# Patient Record
Sex: MALE | Race: WHITE | NOT HISPANIC OR LATINO | Employment: OTHER | URBAN - METROPOLITAN AREA
[De-identification: names, ages, dates, MRNs, and addresses within clinical notes are randomized per-mention and may not be internally consistent; named-entity substitution may affect disease eponyms.]

---

## 2019-04-06 ENCOUNTER — TRANSCRIBE ORDERS (OUTPATIENT)
Dept: ADMINISTRATIVE | Facility: HOSPITAL | Age: 39
End: 2019-04-06

## 2019-04-06 DIAGNOSIS — R51.9 NONINTRACTABLE HEADACHE, UNSPECIFIED CHRONICITY PATTERN, UNSPECIFIED HEADACHE TYPE: ICD-10-CM

## 2019-04-06 DIAGNOSIS — R25.1 TREMORS OF NERVOUS SYSTEM: ICD-10-CM

## 2019-04-06 DIAGNOSIS — M54.2 NECK PAIN: Primary | ICD-10-CM

## 2019-04-06 DIAGNOSIS — R56.9 SEIZURES (HCC): ICD-10-CM

## 2019-04-23 ENCOUNTER — HOSPITAL ENCOUNTER (OUTPATIENT)
Dept: RADIOLOGY | Facility: HOSPITAL | Age: 39
Discharge: HOME | End: 2019-04-23
Payer: MEDICARE

## 2019-04-23 ENCOUNTER — HOSPITAL ENCOUNTER (OUTPATIENT)
Dept: RADIOLOGY | Facility: HOSPITAL | Age: 39
Discharge: HOME | End: 2019-04-23

## 2019-04-23 DIAGNOSIS — R56.9 SEIZURES (HCC): ICD-10-CM

## 2019-04-23 DIAGNOSIS — R51.9 NONINTRACTABLE HEADACHE, UNSPECIFIED CHRONICITY PATTERN, UNSPECIFIED HEADACHE TYPE: ICD-10-CM

## 2019-04-23 DIAGNOSIS — M54.2 NECK PAIN: ICD-10-CM

## 2019-04-23 DIAGNOSIS — R25.1 TREMORS OF NERVOUS SYSTEM: ICD-10-CM

## 2019-04-23 PROCEDURE — A9585 GADOBUTROL INJECTION: HCPCS

## 2019-04-23 PROCEDURE — 70553 MRI BRAIN STEM W/O & W/DYE: CPT

## 2019-04-23 PROCEDURE — 72141 MRI NECK SPINE W/O DYE: CPT

## 2019-04-23 RX ADMIN — GADOBUTROL 7 ML: 604.72 INJECTION INTRAVENOUS at 15:06

## 2019-06-17 ENCOUNTER — HOSPITAL ENCOUNTER (OUTPATIENT)
Dept: RADIOLOGY | Facility: HOSPITAL | Age: 39
Discharge: HOME | End: 2019-06-17
Attending: OTOLARYNGOLOGY
Payer: MEDICARE

## 2019-06-17 DIAGNOSIS — M24.20 EAGLE'S SYNDROME: ICD-10-CM

## 2019-06-17 PROCEDURE — 70491 CT SOFT TISSUE NECK W/DYE: CPT

## 2019-06-17 RX ADMIN — IOHEXOL 85 ML: 350 INJECTION, SOLUTION INTRAVENOUS at 10:06

## 2021-04-13 ENCOUNTER — HOSPITAL ENCOUNTER (EMERGENCY)
Facility: HOSPITAL | Age: 41
Discharge: HOME | End: 2021-04-13
Attending: EMERGENCY MEDICINE
Payer: MEDICARE

## 2021-04-13 ENCOUNTER — APPOINTMENT (EMERGENCY)
Dept: RADIOLOGY | Facility: HOSPITAL | Age: 41
End: 2021-04-13
Payer: MEDICARE

## 2021-04-13 VITALS
TEMPERATURE: 98.2 F | WEIGHT: 215 LBS | SYSTOLIC BLOOD PRESSURE: 155 MMHG | OXYGEN SATURATION: 98 % | DIASTOLIC BLOOD PRESSURE: 95 MMHG | RESPIRATION RATE: 16 BRPM | HEART RATE: 89 BPM | BODY MASS INDEX: 32.69 KG/M2

## 2021-04-13 DIAGNOSIS — M79.10 MYALGIA: ICD-10-CM

## 2021-04-13 DIAGNOSIS — G89.29 CHRONIC BACK PAIN: ICD-10-CM

## 2021-04-13 DIAGNOSIS — M54.9 CHRONIC BACK PAIN: ICD-10-CM

## 2021-04-13 DIAGNOSIS — M79.604 BILATERAL LEG PAIN: Primary | ICD-10-CM

## 2021-04-13 DIAGNOSIS — M79.605 BILATERAL LEG PAIN: Primary | ICD-10-CM

## 2021-04-13 LAB
ALBUMIN SERPL BCP-MCNC: 4.5 G/DL (ref 3.5–5)
ALP SERPL-CCNC: 138 U/L (ref 46–116)
ALT SERPL W P-5'-P-CCNC: 40 U/L (ref 12–78)
ANION GAP SERPL CALCULATED.3IONS-SCNC: 12 MMOL/L (ref 4–13)
APTT PPP: 29 SECONDS (ref 23–37)
AST SERPL W P-5'-P-CCNC: 20 U/L (ref 5–45)
BASOPHILS # BLD AUTO: 0.04 THOUSANDS/ΜL (ref 0–0.1)
BASOPHILS NFR BLD AUTO: 0 % (ref 0–1)
BILIRUB SERPL-MCNC: 0.85 MG/DL (ref 0.2–1)
BUN SERPL-MCNC: 18 MG/DL (ref 5–25)
CALCIUM SERPL-MCNC: 8.8 MG/DL (ref 8.3–10.1)
CHLORIDE SERPL-SCNC: 104 MMOL/L (ref 100–108)
CK MB SERPL-MCNC: 1.1 NG/ML (ref 0–5)
CK MB SERPL-MCNC: <1 % (ref 0–2.5)
CK SERPL-CCNC: 255 U/L (ref 39–308)
CO2 SERPL-SCNC: 22 MMOL/L (ref 21–32)
CREAT SERPL-MCNC: 1.22 MG/DL (ref 0.6–1.3)
D DIMER PPP FEU-MCNC: 0.41 UG/ML FEU
EOSINOPHIL # BLD AUTO: 0.03 THOUSAND/ΜL (ref 0–0.61)
EOSINOPHIL NFR BLD AUTO: 0 % (ref 0–6)
ERYTHROCYTE [DISTWIDTH] IN BLOOD BY AUTOMATED COUNT: 13.2 % (ref 11.6–15.1)
GFR SERPL CREATININE-BSD FRML MDRD: 73 ML/MIN/1.73SQ M
GLUCOSE SERPL-MCNC: 117 MG/DL (ref 65–140)
HCT VFR BLD AUTO: 52.5 % (ref 36.5–49.3)
HGB BLD-MCNC: 17.1 G/DL (ref 12–17)
IMM GRANULOCYTES # BLD AUTO: 0.11 THOUSAND/UL (ref 0–0.2)
IMM GRANULOCYTES NFR BLD AUTO: 1 % (ref 0–2)
INR PPP: 1.06 (ref 0.84–1.19)
LIPASE SERPL-CCNC: 64 U/L (ref 73–393)
LYMPHOCYTES # BLD AUTO: 1.9 THOUSANDS/ΜL (ref 0.6–4.47)
LYMPHOCYTES NFR BLD AUTO: 15 % (ref 14–44)
MCH RBC QN AUTO: 29.1 PG (ref 26.8–34.3)
MCHC RBC AUTO-ENTMCNC: 32.6 G/DL (ref 31.4–37.4)
MCV RBC AUTO: 89 FL (ref 82–98)
MONOCYTES # BLD AUTO: 0.86 THOUSAND/ΜL (ref 0.17–1.22)
MONOCYTES NFR BLD AUTO: 7 % (ref 4–12)
NEUTROPHILS # BLD AUTO: 10.12 THOUSANDS/ΜL (ref 1.85–7.62)
NEUTS SEG NFR BLD AUTO: 77 % (ref 43–75)
NRBC BLD AUTO-RTO: 0 /100 WBCS
PLATELET # BLD AUTO: 248 THOUSANDS/UL (ref 149–390)
PMV BLD AUTO: 9.8 FL (ref 8.9–12.7)
POTASSIUM SERPL-SCNC: 3.6 MMOL/L (ref 3.5–5.3)
PROT SERPL-MCNC: 8.5 G/DL (ref 6.4–8.2)
PROTHROMBIN TIME: 13.7 SECONDS (ref 11.6–14.5)
RBC # BLD AUTO: 5.87 MILLION/UL (ref 3.88–5.62)
SODIUM SERPL-SCNC: 138 MMOL/L (ref 136–145)
WBC # BLD AUTO: 13.06 THOUSAND/UL (ref 4.31–10.16)

## 2021-04-13 PROCEDURE — 74177 CT ABD & PELVIS W/CONTRAST: CPT

## 2021-04-13 PROCEDURE — 96374 THER/PROPH/DIAG INJ IV PUSH: CPT

## 2021-04-13 PROCEDURE — 85025 COMPLETE CBC W/AUTO DIFF WBC: CPT | Performed by: PHYSICIAN ASSISTANT

## 2021-04-13 PROCEDURE — 99284 EMERGENCY DEPT VISIT MOD MDM: CPT

## 2021-04-13 PROCEDURE — 86617 LYME DISEASE ANTIBODY: CPT | Performed by: PHYSICIAN ASSISTANT

## 2021-04-13 PROCEDURE — 85730 THROMBOPLASTIN TIME PARTIAL: CPT | Performed by: PHYSICIAN ASSISTANT

## 2021-04-13 PROCEDURE — 36415 COLL VENOUS BLD VENIPUNCTURE: CPT | Performed by: PHYSICIAN ASSISTANT

## 2021-04-13 PROCEDURE — 86618 LYME DISEASE ANTIBODY: CPT | Performed by: PHYSICIAN ASSISTANT

## 2021-04-13 PROCEDURE — 83690 ASSAY OF LIPASE: CPT | Performed by: PHYSICIAN ASSISTANT

## 2021-04-13 PROCEDURE — 85379 FIBRIN DEGRADATION QUANT: CPT | Performed by: PHYSICIAN ASSISTANT

## 2021-04-13 PROCEDURE — 82550 ASSAY OF CK (CPK): CPT | Performed by: PHYSICIAN ASSISTANT

## 2021-04-13 PROCEDURE — 99284 EMERGENCY DEPT VISIT MOD MDM: CPT | Performed by: PHYSICIAN ASSISTANT

## 2021-04-13 PROCEDURE — 80053 COMPREHEN METABOLIC PANEL: CPT | Performed by: PHYSICIAN ASSISTANT

## 2021-04-13 PROCEDURE — 85610 PROTHROMBIN TIME: CPT | Performed by: PHYSICIAN ASSISTANT

## 2021-04-13 PROCEDURE — 82553 CREATINE MB FRACTION: CPT | Performed by: PHYSICIAN ASSISTANT

## 2021-04-13 PROCEDURE — 96361 HYDRATE IV INFUSION ADD-ON: CPT

## 2021-04-13 RX ORDER — CYCLOBENZAPRINE HCL 10 MG
10 TABLET ORAL 3 TIMES DAILY PRN
Qty: 9 TABLET | Refills: 0 | Status: SHIPPED | OUTPATIENT
Start: 2021-04-13 | End: 2021-04-16

## 2021-04-13 RX ORDER — KETOROLAC TROMETHAMINE 30 MG/ML
15 INJECTION, SOLUTION INTRAMUSCULAR; INTRAVENOUS ONCE
Status: COMPLETED | OUTPATIENT
Start: 2021-04-13 | End: 2021-04-13

## 2021-04-13 RX ADMIN — SODIUM CHLORIDE 1000 ML: 0.9 INJECTION, SOLUTION INTRAVENOUS at 11:03

## 2021-04-13 RX ADMIN — KETOROLAC TROMETHAMINE 15 MG: 30 INJECTION, SOLUTION INTRAMUSCULAR at 12:03

## 2021-04-13 RX ADMIN — IOHEXOL 100 ML: 350 INJECTION, SOLUTION INTRAVENOUS at 11:18

## 2021-04-13 NOTE — DISCHARGE INSTRUCTIONS
Do not drive or operate heavy machinery while taking Flexeril as this medication may make you sleepy and at risk for sedation

## 2021-04-13 NOTE — ED PROVIDER NOTES
History  Chief Complaint   Patient presents with    Leg Pain     pt complains of left leg pain that started in lateral thigh and now has traveled up to left groin? states he had a scratch, skin isnt warm or swollen; pt states also now has pain in right hamstring; denies fevers     39 y/o male presenting with left upper outer thigh pain accompanied with numbness in the same location radiating to the left lower buttock region over the past 2 weeks  Patient states that he has had some intermittent body aches located to the bilateral upper arms and lower extremities  Now experiencing some thigh tightness on the right side  Has had intermittent sweating episodes when sleeping last night, however no weight loss  Patient states that the left thigh pain began after he scratched his leg  Has not noticed any spreading redness or drainage, has not had any fevers however has had chills  No sick contacts that he knows of  Has had intermittent epigastric abdominal pain that is nonradiating that started a few days ago, took Pepto-Bismol and is unsure if this alleviated any of his symptoms  Patient has chronic back pain which has not worsened  Some constipation the past few days which seems to be new  Denies nausea vomiting, diarrhea, shortness breath, chest pain  Prior to Admission Medications   Prescriptions Last Dose Informant Patient Reported? Taking?   gabapentin (NEURONTIN) 100 mg capsule   No Yes   Sig: Take 1 capsule (100 mg total) by mouth 3 (three) times a day   omeprazole (PriLOSEC) 40 MG capsule Not Taking at Unknown time Self No No   Sig: Take 1 capsule (40 mg total) by mouth daily   Patient not taking: Reported on 4/13/2021      Facility-Administered Medications: None       History reviewed  No pertinent past medical history  History reviewed  No pertinent surgical history      Family History   Family history unknown: Yes     I have reviewed and agree with the history as documented  E-Cigarette/Vaping     E-Cigarette/Vaping Substances     Social History     Tobacco Use    Smoking status: Never Smoker    Smokeless tobacco: Never Used   Substance Use Topics    Alcohol use: Never     Frequency: Never    Drug use: Never       Review of Systems   Constitutional: Positive for chills and fatigue  Negative for activity change, appetite change, diaphoresis, fever and unexpected weight change  HENT: Negative  Eyes: Negative  Respiratory: Negative  Cardiovascular: Negative  Gastrointestinal: Positive for abdominal pain  Negative for abdominal distention, anal bleeding, blood in stool, constipation, diarrhea, nausea, rectal pain and vomiting  Genitourinary: Negative  Musculoskeletal: Positive for myalgias  Negative for arthralgias, back pain, gait problem, joint swelling, neck pain and neck stiffness  Skin: Positive for rash  Negative for color change, pallor and wound  Neurological: Negative  All other systems reviewed and are negative  Physical Exam  Physical Exam  Vitals signs and nursing note reviewed  Constitutional:       General: He is not in acute distress  Appearance: He is well-developed  He is not diaphoretic  HENT:      Head: Normocephalic and atraumatic  Right Ear: External ear normal       Left Ear: External ear normal       Nose: Nose normal       Mouth/Throat:      Pharynx: No oropharyngeal exudate  Eyes:      General: No scleral icterus  Right eye: No discharge  Left eye: No discharge  Conjunctiva/sclera: Conjunctivae normal       Pupils: Pupils are equal, round, and reactive to light  Neck:      Musculoskeletal: Normal range of motion and neck supple  Cardiovascular:      Rate and Rhythm: Normal rate and regular rhythm  Pulses: Normal pulses  Heart sounds: Normal heart sounds  No murmur  No friction rub  No gallop      Pulmonary:      Effort: Pulmonary effort is normal  No respiratory distress  Breath sounds: Normal breath sounds  No stridor  No wheezing, rhonchi or rales  Comments: spo2 is 99% indicating adequate oxygenation   Clear BS in all lung fields  Chest:      Chest wall: No tenderness  Abdominal:      General: Abdomen is flat  Bowel sounds are normal  There is no distension  Palpations: Abdomen is soft  There is no mass  Tenderness: There is abdominal tenderness  There is no right CVA tenderness, left CVA tenderness, guarding or rebound  Hernia: No hernia is present  Musculoskeletal:        Arms:       Comments: No calf swelling or asymmetries, no tenderness, negative Homans sign bilaterally, normal sensation throughout bilateral lower extremities with 2+ DP pulses  Patient ambulating without difficulty  No tenderness to the bilateral lower extremities   Lymphadenopathy:      Cervical: No cervical adenopathy  Skin:     General: Skin is warm and dry  Capillary Refill: Capillary refill takes less than 2 seconds  Coloration: Skin is not pale  Findings: No erythema or rash  Neurological:      General: No focal deficit present  Mental Status: He is alert and oriented to person, place, and time  Mental status is at baseline  Cranial Nerves: No cranial nerve deficit           Vital Signs  ED Triage Vitals [04/13/21 0926]   Temperature Pulse Respirations Blood Pressure SpO2   98 °F (36 7 °C) 88 17 (!) 180/75 99 %      Temp Source Heart Rate Source Patient Position - Orthostatic VS BP Location FiO2 (%)   Tympanic -- Sitting Left arm --      Pain Score       Worst Possible Pain           Vitals:    04/13/21 0926 04/13/21 1129   BP: (!) 180/75 155/95   Pulse: 88 89   Patient Position - Orthostatic VS: Sitting Sitting         Visual Acuity      ED Medications  Medications   sodium chloride 0 9 % bolus 1,000 mL (0 mL Intravenous Stopped 4/13/21 1209)   iohexol (OMNIPAQUE) 350 MG/ML injection (SINGLE-DOSE) 100 mL (100 mL Intravenous Given 4/13/21 1118)   ketorolac (TORADOL) injection 15 mg (15 mg Intravenous Given 4/13/21 1203)       Diagnostic Studies  Results Reviewed     Procedure Component Value Units Date/Time    Lyme Antibody Profile with reflex to [facility] [[id number]] Collected: 04/13/21 1200    Lab Status:  In process Specimen: Blood from Arm, Left Updated: 04/13/21 1204    CKMB [[id number]]  (Normal) Collected: 04/13/21 1009    Lab Status: Final result Specimen: Blood from Arm, Left Updated: 04/13/21 1151     CK-MB Index <1 0 %      CK-MB 1 1 ng/mL     Lipase [[id number]]  (Abnormal) Collected: 04/13/21 1009    Lab Status: Final result Specimen: Blood from Arm, Left Updated: 04/13/21 1051     Lipase 64 u/L     CK (with reflex to MB) [[id number]]  (Normal) Collected: 04/13/21 1009    Lab Status: Final result Specimen: Blood from Arm, Left Updated: 04/13/21 1051     Total  U/L     Comprehensive metabolic panel [[id number]]  (Abnormal) Collected: 04/13/21 1009    Lab Status: Final result Specimen: Blood from Arm, Left Updated: 04/13/21 1033     Sodium 138 mmol/L      Potassium 3 6 mmol/L      Chloride 104 mmol/L      CO2 22 mmol/L      ANION GAP 12 mmol/L      BUN 18 mg/dL      Creatinine 1 22 mg/dL      Glucose 117 mg/dL      Calcium 8 8 mg/dL      AST 20 U/L      ALT 40 U/L      Alkaline Phosphatase 138 U/L      Total Protein 8 5 g/dL      Albumin 4 5 g/dL      Total Bilirubin 0 85 mg/dL      eGFR 73 ml/min/1 73sq m     Narrative:      Vikas guidelines for Chronic Kidney Disease (CKD):     Stage 1 with normal or high GFR (GFR > 90 mL/min/1 73 square meters)    Stage 2 Mild CKD (GFR = 60-89 mL/min/1 73 square meters)    Stage 3A Moderate CKD (GFR = 45-59 mL/min/1 73 square meters)    Stage 3B Moderate CKD (GFR = 30-44 mL/min/1 73 square meters)    Stage 4 Severe CKD (GFR = 15-29 mL/min/1 73 square meters)    Stage 5 End Stage CKD (GFR <15 mL/min/1 73 square meters)  Note: GFR calculation is accurate only with a steady state creatinine    D-dimer, quantitative [[id number]]  (Normal) Collected: 21 100    Lab Status: Final result Specimen: Blood from Arm, Left Updated: 21 1031     D-Dimer, Quant 0 41 ug/ml FEU     Protime-INR [[id number]]  (Normal) Collected: 21    Lab Status: Final result Specimen: Blood from Arm, Left Updated: 21 1030     Protime 13 7 seconds      INR 1 06    APTT [[id number]]  (Normal) Collected: 21    Lab Status: Final result Specimen: Blood from Arm, Left Updated: 21 1030     PTT 29 seconds     CBC and differential [[id number]]  (Abnormal) Collected: 21    Lab Status: Final result Specimen: Blood from Arm, Left Updated: 21 1015     WBC 13 06 Thousand/uL      RBC 5 87 Million/uL      Hemoglobin 17 1 g/dL      Hematocrit 52 5 %      MCV 89 fL      MCH 29 1 pg      MCHC 32 6 g/dL      RDW 13 2 %      MPV 9 8 fL      Platelets 405 Thousands/uL      nRBC 0 /100 WBCs      Neutrophils Relative 77 %      Immat GRANS % 1 %      Lymphocytes Relative 15 %      Monocytes Relative 7 %      Eosinophils Relative 0 %      Basophils Relative 0 %      Neutrophils Absolute 10 12 Thousands/µL      Immature Grans Absolute 0 11 Thousand/uL      Lymphocytes Absolute 1 90 Thousands/µL      Monocytes Absolute 0 86 Thousand/µL      Eosinophils Absolute 0 03 Thousand/µL      Basophils Absolute 0 04 Thousands/µL                  CT abdomen pelvis with contrast   Final Result by Mazin Bull MD ([id number])      No acute pathology  Workstation performed: [id number]                    Procedures  Procedures         ED Course  ED Course as of 1624   Tue 2021   1059 Patient re-evaluated, continues with epigastric abdominal pain and tenderness, patient states that his father  from [address] [facility] [address] and this a concern   He is also requesting a Lymes test                                               MDM  Number of Diagnoses or Management Options  Bilateral leg pain:   Chronic back pain:   Myalgia:   Diagnosis management comments: Patient requesting a Lyme's test  Discussed labs with patient, as he continues with abdominal pain and has an elevated WBC a CT of abdomen and pelvis ordered  Patient is informed to return to the emergency department for worsening of symptoms and was given proper education regarding their diagnosis and symptoms  Otherwise the patient is informed to follow up with their primary care doctor for re-evaluation  The patient verbalizes understanding and agrees with above assessment and plan  All questions were answered  Please Note: Fluency Direct voice recognition software may have been used in the creation of this document  Wrong words or sound a like substitutions may have occurred due to the inherent limitations of the voice software  Amount and/or Complexity of Data Reviewed  Clinical lab tests: ordered and reviewed  Tests in the radiology section of CPT®: ordered and reviewed  Review and summarize past medical records: yes  Discuss the patient with other providers: yes  Independent visualization of images, tracings, or specimens: yes        Disposition  Final diagnoses:   Bilateral leg pain   Myalgia   Chronic back pain     Time reflects when diagnosis was documented in both MDM as applicable and the Disposition within this note     Time User Action Codes Description Comment    4/13/2021 11:45 AM Edinson Meals Add [I49 793,  M79 605] Bilateral leg pain     4/13/2021 11:45 AM Edinson Meals Add [M79 10] Myalgia     4/13/2021 11:45 AM Edinson Meals Add [M54 9,  G89 29] Chronic back pain       ED Disposition     ED Disposition Condition Date/Time Comment    Discharge Stable Tue Apr 13, 2021 11:45 AM Christopher Chan discharge to home/self care              Follow-up Information     Follow up With Specialties Details Why Contact Info Additional Information    [address] [facility] Emergency Medicine Go to  If symptoms worsen such as high fevers etc, otherwise please follow up with your family doctor [address] [address] [address] [address] Emergency Department, [place], [place], [facility] Marko Plata MD Internal Medicine Schedule an appointment as soon as possible for a visit in 1 day  [address]  [phone]             Discharge Medication List as of 4/13/2021 11:46 AM      START taking these medications    Details   cyclobenzaprine (FLEXERIL) 10 mg tablet Take 1 tablet (10 mg total) by mouth 3 (three) times a day as needed for muscle spasms for up to 3 days, Starting Tue 4/13/2021, Until Fri 4/16/2021, Normal         CONTINUE these medications which have NOT CHANGED    Details   gabapentin (NEURONTIN) 100 mg capsule Take 1 capsule (100 mg total) by mouth 3 (three) times a day, Starting Mon 6/24/2019, Normal      omeprazole (PriLOSEC) 40 MG capsule Take 1 capsule (40 mg total) by mouth daily, Starting Mon 6/10/2019, Normal           No discharge procedures on file      PDMP Review     None          ED Provider  Electronically Signed by           Gina Bales PA-C  04/13/21 6507

## 2021-04-14 LAB — B BURGDOR IGG+IGM SER-ACNC: 622

## 2021-04-15 LAB
B BURGDOR IGG PATRN SER IB-IMP: POSITIVE
B BURGDOR IGM PATRN SER IB-IMP: NEGATIVE
B BURGDOR18KD IGG SER QL IB: PRESENT
B BURGDOR23KD IGG SER QL IB: PRESENT
B BURGDOR23KD IGM SER QL IB: PRESENT
B BURGDOR28KD IGG SER QL IB: PRESENT
B BURGDOR30KD IGG SER QL IB: PRESENT
B BURGDOR39KD IGG SER QL IB: PRESENT
B BURGDOR39KD IGM SER QL IB: ABNORMAL
B BURGDOR41KD IGG SER QL IB: PRESENT
B BURGDOR41KD IGM SER QL IB: ABNORMAL
B BURGDOR45KD IGG SER QL IB: PRESENT
B BURGDOR58KD IGG SER QL IB: PRESENT
B BURGDOR66KD IGG SER QL IB: PRESENT
B BURGDOR93KD IGG SER QL IB: PRESENT

## 2021-05-17 ENCOUNTER — OFFICE VISIT (OUTPATIENT)
Dept: URGENT CARE | Facility: CLINIC | Age: 41
End: 2021-05-17
Payer: MEDICARE

## 2021-05-17 VITALS
BODY MASS INDEX: 32.13 KG/M2 | WEIGHT: 212 LBS | DIASTOLIC BLOOD PRESSURE: 115 MMHG | HEIGHT: 68 IN | SYSTOLIC BLOOD PRESSURE: 180 MMHG | HEART RATE: 83 BPM | RESPIRATION RATE: 18 BRPM | OXYGEN SATURATION: 97 % | TEMPERATURE: 98.7 F

## 2021-05-17 DIAGNOSIS — M79.604 LEG PAIN, BILATERAL: Primary | ICD-10-CM

## 2021-05-17 DIAGNOSIS — M79.605 LEG PAIN, BILATERAL: Primary | ICD-10-CM

## 2021-05-17 PROCEDURE — 99213 OFFICE O/P EST LOW 20 MIN: CPT | Performed by: PHYSICIAN ASSISTANT

## 2021-05-17 NOTE — PROGRESS NOTES
[facility]        NAME: Melissa Tabor is a 38 y o  male  : 1980    MRN: [id number]  DATE: May 17, 2021  TIME: 3:43 PM    Assessment and Plan   Leg pain, bilateral [M79 604, M79 605]  1  Leg pain, bilateral           Patient Instructions   No source of infection identified  Offered rx for pain, patient declined  rec f/u with PCP, names given    Follow up with PCP in 3-5 days  Proceed to  ER if symptoms worsen  Chief Complaint     Chief Complaint   Patient presents with    Leg Pain     internal leg pain bilateral thighs feels he has infection inside   History of Present Illness        He is a 21-year-old male who presents to clinic complaining of bilateral thigh pain x1 month  He describes the pain as sharp and intermittent  He also notes tenderness to palpation along the lateral aspect of both thighs  He denies pain at rest but notes more pain with the more times been on his feet  He also notes headache that has been present since the pain started  He states that beginning of the pain he did have a rash from his thigh is but that has resolved  He feels that he has an infection underneath the skin in his legs  He denies any fever chills or rash currently  He denies any injury, trauma, or overuse of his legs  He also is complaining of right upper molar tooth pain x3 months  He denies any bleeding discharge, or gum swelling  He denies any sensitivity to cold or hot liquids  He is requesting antibiotic 1st tooth and his leg infection  He does have a history of Lyme disease and had recent blood work but is unsure of the results  Review of Systems   Review of Systems   Constitutional: Negative for chills, fatigue and fever  Musculoskeletal: Positive for myalgias  Negative for arthralgias, back pain, gait problem, joint swelling, neck pain and neck stiffness  Skin: Negative for color change, rash and wound       Current Medications       Current Outpatient Medications:     cyclobenzaprine (FLEXERIL) 10 mg tablet, Take 1 tablet (10 mg total) by mouth 3 (three) times a day as needed for muscle spasms for up to 3 days, Disp: 9 tablet, Rfl: 0    gabapentin (NEURONTIN) 100 mg capsule, Take 1 capsule (100 mg total) by mouth 3 (three) times a day (Patient not taking: Reported on 5/17/2021), Disp: 90 capsule, Rfl: 3    omeprazole (PriLOSEC) 40 MG capsule, Take 1 capsule (40 mg total) by mouth daily (Patient not taking: Reported on 4/13/2021), Disp: 30 capsule, Rfl: 2    Current Allergies     Allergies as of 05/17/2021 - Reviewed 05/17/2021   Allergen Reaction Noted    Penicillins Hives 02/11/2005    Shellfish-derived products - food allergy Itching 03/18/2019            The following portions of the patient's history were reviewed and updated as appropriate: allergies, current medications, past family history, past medical history, past social history, past surgical history and problem list      History reviewed  No pertinent past medical history  History reviewed  No pertinent surgical history  Family History   Family history unknown: Yes         Medications have been verified  Objective   BP (!) 180/115   Pulse 83   Temp 98 7 °F (37 1 °C)   Resp 18   Ht 5' 8" (1 727 m)   Wt 96 2 kg (212 lb)   SpO2 97%   BMI 32 23 kg/m²   No LMP for male patient  Physical Exam     Physical Exam  Vitals signs and nursing note reviewed  Constitutional:       General: He is not in acute distress  Appearance: Normal appearance  He is not ill-appearing  HENT:      Mouth/Throat:      Mouth: No oral lesions or angioedema  Dentition: Normal dentition  No dental tenderness, gingival swelling, dental abscesses or gum lesions  Cardiovascular:      Rate and Rhythm: Normal rate and regular rhythm  Heart sounds: Normal heart sounds  Pulmonary:      Effort: Pulmonary effort is normal       Breath sounds: Normal breath sounds     Musculoskeletal: Right upper leg: Normal       Left upper leg: Normal    Neurological:      Mental Status: He is alert and oriented to person, place, and time     Psychiatric:         Mood and Affect: Mood normal          Behavior: Behavior normal

## 2021-05-17 NOTE — PATIENT INSTRUCTIONS
No source of infection identified  Offered rx for pain, patient declined  rec f/u with PCP, names given    Follow up with PCP in 3-5 days  Proceed to  ER if symptoms worsen

## 2023-03-24 ENCOUNTER — HOSPITAL ENCOUNTER (EMERGENCY)
Facility: HOSPITAL | Age: 43
Discharge: HOME | End: 2023-03-24
Attending: EMERGENCY MEDICINE

## 2023-03-24 ENCOUNTER — APPOINTMENT (EMERGENCY)
Dept: RADIOLOGY | Facility: HOSPITAL | Age: 43
End: 2023-03-24

## 2023-03-24 VITALS
SYSTOLIC BLOOD PRESSURE: 171 MMHG | RESPIRATION RATE: 15 BRPM | BODY MASS INDEX: 30.41 KG/M2 | TEMPERATURE: 97.5 F | OXYGEN SATURATION: 96 % | WEIGHT: 200 LBS | DIASTOLIC BLOOD PRESSURE: 99 MMHG | HEART RATE: 80 BPM

## 2023-03-24 DIAGNOSIS — R11.0 NAUSEA: ICD-10-CM

## 2023-03-24 DIAGNOSIS — M79.10 MYALGIA: ICD-10-CM

## 2023-03-24 DIAGNOSIS — R55 SYNCOPE: ICD-10-CM

## 2023-03-24 DIAGNOSIS — E86.0 DEHYDRATION: Primary | ICD-10-CM

## 2023-03-24 LAB
ALBUMIN SERPL BCP-MCNC: 5 G/DL (ref 3.5–5)
ALP SERPL-CCNC: 107 U/L (ref 34–104)
ALT SERPL W P-5'-P-CCNC: 24 U/L (ref 7–52)
ANION GAP SERPL CALCULATED.3IONS-SCNC: 10 MMOL/L (ref 4–13)
AST SERPL W P-5'-P-CCNC: 15 U/L (ref 13–39)
BASOPHILS # BLD AUTO: 0.04 THOUSANDS/ÂΜL (ref 0–0.1)
BASOPHILS NFR BLD AUTO: 0 % (ref 0–1)
BILIRUB SERPL-MCNC: 1.01 MG/DL (ref 0.2–1)
BUN SERPL-MCNC: 15 MG/DL (ref 5–25)
CALCIUM SERPL-MCNC: 9.4 MG/DL (ref 8.4–10.2)
CARDIAC TROPONIN I PNL SERPL HS: 4 NG/L
CHLORIDE SERPL-SCNC: 103 MMOL/L (ref 96–108)
CO2 SERPL-SCNC: 22 MMOL/L (ref 21–32)
CREAT SERPL-MCNC: 0.91 MG/DL (ref 0.6–1.3)
EOSINOPHIL # BLD AUTO: 0.01 THOUSAND/ÂΜL (ref 0–0.61)
EOSINOPHIL NFR BLD AUTO: 0 % (ref 0–6)
ERYTHROCYTE [DISTWIDTH] IN BLOOD BY AUTOMATED COUNT: 12.5 % (ref 11.6–15.1)
GFR SERPL CREATININE-BSD FRML MDRD: 102 ML/MIN/1.73SQ M
GLUCOSE SERPL-MCNC: 122 MG/DL (ref 65–140)
HCT VFR BLD AUTO: 52.5 % (ref 36.5–49.3)
HGB BLD-MCNC: 18.4 G/DL (ref 12–17)
IMM GRANULOCYTES # BLD AUTO: 0.14 THOUSAND/UL (ref 0–0.2)
IMM GRANULOCYTES NFR BLD AUTO: 1 % (ref 0–2)
LYMPHOCYTES # BLD AUTO: 1.29 THOUSANDS/ÂΜL (ref 0.6–4.47)
LYMPHOCYTES NFR BLD AUTO: 8 % (ref 14–44)
MCH RBC QN AUTO: 30.4 PG (ref 26.8–34.3)
MCHC RBC AUTO-ENTMCNC: 35 G/DL (ref 31.4–37.4)
MCV RBC AUTO: 87 FL (ref 82–98)
MONOCYTES # BLD AUTO: 0.64 THOUSAND/ÂΜL (ref 0.17–1.22)
MONOCYTES NFR BLD AUTO: 4 % (ref 4–12)
NEUTROPHILS # BLD AUTO: 13.36 THOUSANDS/ÂΜL (ref 1.85–7.62)
NEUTS SEG NFR BLD AUTO: 87 % (ref 43–75)
NRBC BLD AUTO-RTO: 0 /100 WBCS
PLATELET # BLD AUTO: 268 THOUSANDS/UL (ref 149–390)
PMV BLD AUTO: 9.4 FL (ref 8.9–12.7)
POTASSIUM SERPL-SCNC: 3.7 MMOL/L (ref 3.5–5.3)
PROT SERPL-MCNC: 8.4 G/DL (ref 6.4–8.4)
RBC # BLD AUTO: 6.06 MILLION/UL (ref 3.88–5.62)
SODIUM SERPL-SCNC: 135 MMOL/L (ref 135–147)
WBC # BLD AUTO: 15.48 THOUSAND/UL (ref 4.31–10.16)

## 2023-03-24 RX ORDER — NAPROXEN 500 MG/1
500 TABLET ORAL 2 TIMES DAILY WITH MEALS
Qty: 30 TABLET | Refills: 0 | Status: SHIPPED | OUTPATIENT
Start: 2023-03-24

## 2023-03-24 RX ORDER — FENOFIBRATE 145 MG/1
1 TABLET, COATED ORAL DAILY
COMMUNITY
Start: 2023-02-14

## 2023-03-24 RX ORDER — ROSUVASTATIN CALCIUM 10 MG/1
10 TABLET, COATED ORAL DAILY
COMMUNITY
Start: 2022-10-27 | End: 2023-10-27

## 2023-03-24 RX ORDER — AMLODIPINE BESYLATE 10 MG/1
1 TABLET ORAL DAILY
COMMUNITY
Start: 2023-02-14

## 2023-03-24 RX ORDER — LISINOPRIL 10 MG/1
1 TABLET ORAL DAILY
COMMUNITY
Start: 2023-02-14

## 2023-03-24 RX ORDER — KETOROLAC TROMETHAMINE 30 MG/ML
15 INJECTION, SOLUTION INTRAMUSCULAR; INTRAVENOUS ONCE
Status: COMPLETED | OUTPATIENT
Start: 2023-03-24 | End: 2023-03-24

## 2023-03-24 RX ORDER — ONDANSETRON 4 MG/1
4 TABLET, FILM COATED ORAL EVERY 6 HOURS
Qty: 12 TABLET | Refills: 0 | Status: SHIPPED | OUTPATIENT
Start: 2023-03-24

## 2023-03-24 RX ADMIN — KETOROLAC TROMETHAMINE 15 MG: 30 INJECTION, SOLUTION INTRAMUSCULAR at 10:27

## 2023-03-24 RX ADMIN — SODIUM CHLORIDE 1000 ML: 0.9 INJECTION, SOLUTION INTRAVENOUS at 11:24

## 2023-03-24 NOTE — DISCHARGE INSTRUCTIONS
Your labs were suggestive of dehydration  We have given you fluids and prescribed you Zofran for nausea, Naprosyn for body aches  Make sure you are drinking a lot of fluids at home  If you are feeling lightheaded, sit down, elevate your legs, and drink more fluid  Follow-up with your primary care provider in 1 week for reassessment

## 2023-03-24 NOTE — ED NOTES
"Received pt aao3 with mother at side, pt on CM with SR noted at 80, pt reports was lying down got up to get water felt dizzy \"I felt like I almost passed out but I don't think I went totally out\" pt lowered self to floor states moment of \"tunnel vision\" pt also notes for about a week had \"pressure in my veins in my whole body\" denies headache or recent illness no chest pain, sts is medication compliant   Pt reports fears \"why did I pass out that's not normal\" admits brother had MI at age 36 pt has not c/o CP denies sob no n/v/d no cough     Mane Joy, CHEVY  03/24/23 5493    "

## 2023-03-24 NOTE — ED PROVIDER NOTES
"History  Chief Complaint   Patient presents with   • Syncope     Pt sts \" I passed out I woke up im on the floor  \" Pt reports of being hot and sweaty before it happened  HPI  Patient is a 44-year-old male history of hypertension, hyperlipidemia, remote history of pericarditis presenting for evaluation of multiple symptoms including approximately 5 days of myalgias most pronounced in the arms and legs, general malaise, decreased appetite, left upper quadrant abdominal pain, tenesmus, 1 episode of lightheadedness and syncope  Patient denies fevers, chills, states slight rhinorrhea, denies cough, recent travel or sick contacts  Patient states that he has not been eating much over the course of the past week  Patient denies rectal bleeding, melena  Patient denies any central chest pain, nausea, vomiting, diaphoresis  Patient states that episode of syncope this morning followed getting up to stand and was preceded by lightheadedness/tunnel vision then falling to the floor  Patient does not believe he struck his head, denies headache, neck pain, focal weakness or numbness  Patient continues to feel malaise in the emergency department  Prior to Admission Medications   Prescriptions Last Dose Informant Patient Reported? Taking? amLODIPine (NORVASC) 10 mg tablet   Yes Yes   Sig: Take 1 tablet by mouth daily   fenofibrate (TRICOR) 145 mg tablet   Yes Yes   Sig: Take 1 tablet by mouth daily   lisinopril (ZESTRIL) 10 mg tablet   Yes Yes   Sig: Take 1 tablet by mouth daily   rosuvastatin (CRESTOR) 10 MG tablet   Yes Yes   Sig: Take 10 mg by mouth daily      Facility-Administered Medications: None       No past medical history on file  No past surgical history on file  Family History   Family history unknown: Yes     I have reviewed and agree with the history as documented      E-Cigarette/Vaping     E-Cigarette/Vaping Substances     Social History     Tobacco Use   • Smoking status: Never   • Smokeless " tobacco: Never   Substance Use Topics   • Alcohol use: Never   • Drug use: Never       Review of Systems   Constitutional: Positive for fatigue  Negative for chills and fever  Respiratory: Negative for cough, chest tightness and shortness of breath  Gastrointestinal: Positive for abdominal pain  Negative for abdominal distention, diarrhea, nausea and vomiting  Genitourinary: Negative for flank pain  Musculoskeletal: Positive for myalgias  Negative for arthralgias  Skin: Negative for color change, pallor, rash and wound  Neurological: Positive for syncope and light-headedness  Negative for headaches  Psychiatric/Behavioral: Negative for confusion  Physical Exam  Physical Exam  Vitals and nursing note reviewed  Constitutional:       General: He is not in acute distress  Appearance: He is well-developed  He is not diaphoretic  Comments: Well-appearing, nontoxic, nondistressed   HENT:      Head: Normocephalic and atraumatic  Comments: Moist mucous membranes     Right Ear: External ear normal       Left Ear: External ear normal       Nose: Nose normal       Mouth/Throat:      Pharynx: No oropharyngeal exudate  Eyes:      Conjunctiva/sclera: Conjunctivae normal       Pupils: Pupils are equal, round, and reactive to light  Cardiovascular:      Rate and Rhythm: Normal rate and regular rhythm  Heart sounds: Normal heart sounds  No murmur heard  No friction rub  No gallop  Comments: Sinus tachycardia rate of 105  No murmurs rubs or gallops  Extremities warm and well-perfused without mottling  Pulmonary:      Effort: Pulmonary effort is normal  No respiratory distress  Breath sounds: Normal breath sounds  No wheezing  Comments: No increased work of breathing  Speaking complete sentences  Satting 98% on room air indicating adequate oxygenation  Lungs clear to auscultation bilaterally without wheezes, rales, rhonchi  Chest:      Chest wall: No tenderness  Abdominal:      General: Bowel sounds are normal  There is no distension  Palpations: Abdomen is soft  There is no mass  Tenderness: There is no abdominal tenderness  There is no guarding or rebound  Comments: Abdomen soft, nontender, nondistended  No specific tenderness in the left upper quadrant where patient endorses pain   Musculoskeletal:         General: No deformity  Comments: No lower extremity swelling, erythema, or calf tenderness   Skin:     General: Skin is warm and dry  Capillary Refill: Capillary refill takes less than 2 seconds  Neurological:      Mental Status: He is alert and oriented to person, place, and time        Comments: AAOx3   Psychiatric:         Behavior: Behavior normal          Vital Signs  ED Triage Vitals   Temperature Pulse Respirations Blood Pressure SpO2   03/24/23 1018 03/24/23 1018 03/24/23 1018 03/24/23 1018 03/24/23 1018   97 5 °F (36 4 °C) (!) 112 20 (!) 171/99 98 %      Temp Source Heart Rate Source Patient Position - Orthostatic VS BP Location FiO2 (%)   03/24/23 1018 03/24/23 1018 03/24/23 1018 03/24/23 1018 --   Tympanic Monitor Sitting Right arm       Pain Score       03/24/23 1027       6           Vitals:    03/24/23 1018 03/24/23 1125 03/24/23 1200   BP: (!) 171/99 165/97 (!) 171/99   Pulse: (!) 112 94 80   Patient Position - Orthostatic VS: Sitting Sitting Lying         Visual Acuity  Visual Acuity    Flowsheet Row Most Recent Value   L Pupil Size (mm) 3   R Pupil Size (mm) 3          ED Medications  Medications   sodium chloride 0 9 % bolus 1,000 mL (1,000 mL Intravenous New Bag 3/24/23 1124)   ketorolac (TORADOL) injection 15 mg (15 mg Intramuscular Given 3/24/23 1027)       Diagnostic Studies  Results Reviewed     Procedure Component Value Units Date/Time    HS Troponin 0hr (reflex protocol) [[id number]]  (Normal) Collected: 03/24/23 1123    Lab Status: Final result Specimen: Blood from Arm, Right Updated: 03/24/23 1203     hs TnI 0hr 4 ng/L     HS Troponin I 2hr [[id number]]     Lab Status: No result Specimen: Blood     HS Troponin I 4hr [[id number]]     Lab Status: No result Specimen: Blood     Comprehensive metabolic panel [[id number]]  (Abnormal) Collected: 03/24/23 1123    Lab Status: Final result Specimen: Blood from Arm, Right Updated: 03/24/23 1154     Sodium 135 mmol/L      Potassium 3 7 mmol/L      Chloride 103 mmol/L      CO2 22 mmol/L      ANION GAP 10 mmol/L      BUN 15 mg/dL      Creatinine 0 91 mg/dL      Glucose 122 mg/dL      Calcium 9 4 mg/dL      AST 15 U/L      ALT 24 U/L      Alkaline Phosphatase 107 U/L      Total Protein 8 4 g/dL      Albumin 5 0 g/dL      Total Bilirubin 1 01 mg/dL      eGFR 102 ml/min/1 73sq m     Narrative:      [place] guidelines for Chronic Kidney Disease (CKD):   •  Stage 1 with normal or high GFR (GFR > 90 mL/min/1 73 square meters)  •  Stage 2 Mild CKD (GFR = 60-89 mL/min/1 73 square meters)  •  Stage 3A Moderate CKD (GFR = 45-59 mL/min/1 73 square meters)  •  Stage 3B Moderate CKD (GFR = 30-44 mL/min/1 73 square meters)  •  Stage 4 Severe CKD (GFR = 15-29 mL/min/1 73 square meters)  •  Stage 5 End Stage CKD (GFR <15 mL/min/1 73 square meters)  Note: GFR calculation is accurate only with a steady state creatinine    CBC and differential [[id number]]  (Abnormal) Collected: 03/24/23 1123    Lab Status: Final result Specimen: Blood from Arm, Right Updated: 03/24/23 1129     WBC 15 48 Thousand/uL      RBC 6 06 Million/uL      Hemoglobin 18 4 g/dL      Hematocrit 52 5 %      MCV 87 fL      MCH 30 4 pg      MCHC 35 0 g/dL      RDW 12 5 %      MPV 9 4 fL      Platelets 663 Thousands/uL      nRBC 0 /100 WBCs      Neutrophils Relative 87 %      Immat GRANS % 1 %      Lymphocytes Relative 8 %      Monocytes Relative 4 %      Eosinophils Relative 0 %      Basophils Relative 0 %      Neutrophils Absolute 13 36 Thousands/µL      Immature Grans Absolute 0 14 Thousand/uL      Lymphocytes Absolute 1 29 Thousands/µL      Monocytes Absolute 0 64 Thousand/µL      Eosinophils Absolute 0 01 Thousand/µL      Basophils Absolute 0 04 Thousands/µL                  XR chest 1 view portable    (Results Pending)              Procedures  Procedures         ED Course                               SBIRT 20yo+    Flowsheet Row Most Recent Value   SBIRT (25 yo +)    In order to provide better care to our patients, we are screening all of our patients for alcohol and drug use  Would it be okay to ask you these screening questions? Yes Filed at: 03/24/2023 1042   Initial Alcohol Screen: US AUDIT-C     1  How often do you have a drink containing alcohol? 0 Filed at: 03/24/2023 1042   2  How many drinks containing alcohol do you have on a typical day you are drinking? 0 Filed at: 03/24/2023 1042   3a  Male UNDER 65: How often do you have five or more drinks on one occasion? 0 Filed at: 03/24/2023 1042   Audit-C Score 0 Filed at: 03/24/2023 1042   HONORIO: How many times in the past year have you    Used an illegal drug or used a prescription medication for non-medical reasons? Never Filed at: 03/24/2023 1042                    Medical Decision Making  I obtained history from the patient  Patient with described myalgias and malaise consistent with a viral syndrome and an episode of syncope with preceding warmth suggestive of vasovagal syncope  Patient initially tachycardic but otherwise well-appearing with a nonfocal cardiopulmonary exam   Differential diagnosis includes but is not limited to dehydration, anemia, vasovagal syncope, arrhythmia  I ordered and reviewed labs including CBC, CMP, troponin which demonstrate a leukocytosis and an elevated hemoglobin likely suggestive of hemoconcentration  I treated patient symptomatically with Zofran and a liter of fluids  Patient remaining well-appearing in the emergency department    Patient provided with reassurance, discharged with instructions to continue oral hydration and follow-up with primary care provider    Amount and/or Complexity of Data Reviewed  Labs: ordered  Radiology: ordered  Risk  Prescription drug management  Disposition  Final diagnoses:   Dehydration   Syncope   Myalgia   Nausea     Time reflects when diagnosis was documented in both MDM as applicable and the Disposition within this note     Time User Action Codes Description Comment    3/24/2023 12:17 PM Eden Floras Add [E86 0] Dehydration     3/24/2023 12:17 PM Eden Floras Add [R55] Syncope     3/24/2023 12:17 PM Eden Floras Add [Z22 09] Myalgia     3/24/2023 12:19 PM Eden Floras Add [R11 0] Nausea       ED Disposition     ED Disposition   Discharge    Condition   Stable    Date/Time   Fri Mar 24, 2023 12:17 PM    Comment   Shauna Silva discharge to home/self care  Follow-up Information     Follow up With Specialties Details Why Contact Info Additional Information    [address] [facility] Emergency Department Emergency Medicine  If symptoms worsen [address] [facility] [id number] [address] Emergency Department, [place], [place], [facility]          Patient's Medications   Discharge Prescriptions    NAPROXEN (NAPROSYN) 500 MG TABLET    Take 1 tablet (500 mg total) by mouth 2 (two) times a day with meals       Start Date: 3/24/2023 End Date: --       Order Dose: 500 mg       Quantity: 30 tablet    Refills: 0    ONDANSETRON (ZOFRAN) 4 MG TABLET    Take 1 tablet (4 mg total) by mouth every 6 (six) hours       Start Date: 3/24/2023 End Date: --       Order Dose: 4 mg       Quantity: 12 tablet    Refills: 0       No discharge procedures on file      PDMP Review     None          ED Provider  Electronically Signed by           Aga Carrion MD  03/24/23 6097

## 2023-03-27 LAB
ATRIAL RATE: 98 BPM
P AXIS: 65 DEGREES
PR INTERVAL: 150 MS
QRS AXIS: 29 DEGREES
QRSD INTERVAL: 86 MS
QT INTERVAL: 360 MS
QTC INTERVAL: 459 MS
T WAVE AXIS: 12 DEGREES
VENTRICULAR RATE: 98 BPM

## 2023-04-03 ENCOUNTER — APPOINTMENT (EMERGENCY)
Dept: RADIOLOGY | Facility: HOSPITAL | Age: 43
End: 2023-04-03

## 2023-04-03 ENCOUNTER — HOSPITAL ENCOUNTER (EMERGENCY)
Facility: HOSPITAL | Age: 43
Discharge: HOME | End: 2023-04-03
Attending: EMERGENCY MEDICINE

## 2023-04-03 VITALS
HEART RATE: 82 BPM | WEIGHT: 200 LBS | HEIGHT: 68 IN | RESPIRATION RATE: 18 BRPM | SYSTOLIC BLOOD PRESSURE: 141 MMHG | TEMPERATURE: 97.4 F | DIASTOLIC BLOOD PRESSURE: 89 MMHG | OXYGEN SATURATION: 97 % | BODY MASS INDEX: 30.31 KG/M2

## 2023-04-03 DIAGNOSIS — R52 GENERALIZED PAIN: ICD-10-CM

## 2023-04-03 DIAGNOSIS — K08.89 PAIN, DENTAL: ICD-10-CM

## 2023-04-03 DIAGNOSIS — R10.9 ABDOMINAL PAIN: Primary | ICD-10-CM

## 2023-04-03 LAB
ALBUMIN SERPL BCP-MCNC: 4.9 G/DL (ref 3.5–5)
ALP SERPL-CCNC: 98 U/L (ref 34–104)
ALT SERPL W P-5'-P-CCNC: 25 U/L (ref 7–52)
ANION GAP SERPL CALCULATED.3IONS-SCNC: 9 MMOL/L (ref 4–13)
AST SERPL W P-5'-P-CCNC: 17 U/L (ref 13–39)
BASOPHILS # BLD AUTO: 0.05 THOUSANDS/ÂΜL (ref 0–0.1)
BASOPHILS NFR BLD AUTO: 0 % (ref 0–1)
BILIRUB SERPL-MCNC: 0.55 MG/DL (ref 0.2–1)
BUN SERPL-MCNC: 13 MG/DL (ref 5–25)
CALCIUM SERPL-MCNC: 9.2 MG/DL (ref 8.4–10.2)
CHLORIDE SERPL-SCNC: 102 MMOL/L (ref 96–108)
CO2 SERPL-SCNC: 25 MMOL/L (ref 21–32)
CREAT SERPL-MCNC: 0.85 MG/DL (ref 0.6–1.3)
EOSINOPHIL # BLD AUTO: 0.17 THOUSAND/ÂΜL (ref 0–0.61)
EOSINOPHIL NFR BLD AUTO: 1 % (ref 0–6)
ERYTHROCYTE [DISTWIDTH] IN BLOOD BY AUTOMATED COUNT: 12.3 % (ref 11.6–15.1)
GFR SERPL CREATININE-BSD FRML MDRD: 106 ML/MIN/1.73SQ M
GLUCOSE SERPL-MCNC: 121 MG/DL (ref 65–140)
HCT VFR BLD AUTO: 50.5 % (ref 36.5–49.3)
HGB BLD-MCNC: 17.4 G/DL (ref 12–17)
IMM GRANULOCYTES # BLD AUTO: 0.1 THOUSAND/UL (ref 0–0.2)
IMM GRANULOCYTES NFR BLD AUTO: 1 % (ref 0–2)
LIPASE SERPL-CCNC: 18 U/L (ref 11–82)
LYMPHOCYTES # BLD AUTO: 3.37 THOUSANDS/ÂΜL (ref 0.6–4.47)
LYMPHOCYTES NFR BLD AUTO: 25 % (ref 14–44)
MAGNESIUM SERPL-MCNC: 2.1 MG/DL (ref 1.9–2.7)
MCH RBC QN AUTO: 29.9 PG (ref 26.8–34.3)
MCHC RBC AUTO-ENTMCNC: 34.5 G/DL (ref 31.4–37.4)
MCV RBC AUTO: 87 FL (ref 82–98)
MONOCYTES # BLD AUTO: 0.98 THOUSAND/ÂΜL (ref 0.17–1.22)
MONOCYTES NFR BLD AUTO: 7 % (ref 4–12)
NEUTROPHILS # BLD AUTO: 9.05 THOUSANDS/ÂΜL (ref 1.85–7.62)
NEUTS SEG NFR BLD AUTO: 66 % (ref 43–75)
NRBC BLD AUTO-RTO: 0 /100 WBCS
PLATELET # BLD AUTO: 283 THOUSANDS/UL (ref 149–390)
PMV BLD AUTO: 9.4 FL (ref 8.9–12.7)
POTASSIUM SERPL-SCNC: 3.6 MMOL/L (ref 3.5–5.3)
PROT SERPL-MCNC: 7.9 G/DL (ref 6.4–8.4)
RBC # BLD AUTO: 5.82 MILLION/UL (ref 3.88–5.62)
SODIUM SERPL-SCNC: 136 MMOL/L (ref 135–147)
WBC # BLD AUTO: 13.72 THOUSAND/UL (ref 4.31–10.16)

## 2023-04-03 RX ORDER — NAPROXEN 250 MG/1
250 TABLET ORAL 2 TIMES DAILY WITH MEALS
Qty: 20 TABLET | Refills: 0 | Status: SHIPPED | OUTPATIENT
Start: 2023-04-03

## 2023-04-03 RX ORDER — ONDANSETRON 2 MG/ML
4 INJECTION INTRAMUSCULAR; INTRAVENOUS ONCE
Status: COMPLETED | OUTPATIENT
Start: 2023-04-03 | End: 2023-04-03

## 2023-04-03 RX ORDER — PANTOPRAZOLE SODIUM 20 MG/1
20 TABLET, DELAYED RELEASE ORAL DAILY
Qty: 20 TABLET | Refills: 0 | Status: SHIPPED | OUTPATIENT
Start: 2023-04-03

## 2023-04-03 RX ORDER — PANTOPRAZOLE SODIUM 40 MG/10ML
40 INJECTION, POWDER, LYOPHILIZED, FOR SOLUTION INTRAVENOUS ONCE
Status: COMPLETED | OUTPATIENT
Start: 2023-04-03 | End: 2023-04-03

## 2023-04-03 RX ADMIN — MORPHINE SULFATE 2 MG: 2 INJECTION, SOLUTION INTRAMUSCULAR; INTRAVENOUS at 19:00

## 2023-04-03 RX ADMIN — PANTOPRAZOLE SODIUM 40 MG: 40 INJECTION, POWDER, FOR SOLUTION INTRAVENOUS at 18:55

## 2023-04-03 RX ADMIN — ONDANSETRON 4 MG: 2 INJECTION INTRAMUSCULAR; INTRAVENOUS at 18:58

## 2023-04-03 RX ADMIN — IOHEXOL 100 ML: 350 INJECTION, SOLUTION INTRAVENOUS at 19:18

## 2023-04-03 RX ADMIN — SODIUM CHLORIDE 1000 ML: 0.9 INJECTION, SOLUTION INTRAVENOUS at 18:55

## 2023-04-03 NOTE — ED PROVIDER NOTES
"History  Chief Complaint   Patient presents with   • Pain     States he has pain in abd, both legs, shoulders and neck  Also states \"i've been trying to get to a dentist to have a tooth pulled so I can get better  \" pt has multiple complaints     54-year-old male presents with left-sided upper abdominal pain radiating down his legs started the ago sharp continuous currently 7 out of 10 nothing makes it better or worse  Denies any dark tarry stools no abdominal trauma no nausea vomiting does have some lightheadedness  Denies any constipation or diarrhea  History provided by:  Patient   used: No        Prior to Admission Medications   Prescriptions Last Dose Informant Patient Reported? Taking? amLODIPine (NORVASC) 10 mg tablet   Yes No   Sig: Take 1 tablet by mouth daily   fenofibrate (TRICOR) 145 mg tablet   Yes No   Sig: Take 1 tablet by mouth daily   lisinopril (ZESTRIL) 10 mg tablet   Yes No   Sig: Take 1 tablet by mouth daily   naproxen (Naprosyn) 500 mg tablet   No No   Sig: Take 1 tablet (500 mg total) by mouth 2 (two) times a day with meals   ondansetron (ZOFRAN) 4 mg tablet   No No   Sig: Take 1 tablet (4 mg total) by mouth every 6 (six) hours   rosuvastatin (CRESTOR) 10 MG tablet   Yes No   Sig: Take 10 mg by mouth daily      Facility-Administered Medications: None       History reviewed  No pertinent past medical history  History reviewed  No pertinent surgical history  Family History   Family history unknown: Yes     I have reviewed and agree with the history as documented  E-Cigarette/Vaping     E-Cigarette/Vaping Substances     Social History     Tobacco Use   • Smoking status: Never   • Smokeless tobacco: Never   Substance Use Topics   • Alcohol use: Never   • Drug use: Never       Review of Systems   Constitutional: Negative  HENT: Negative  Eyes: Negative  Respiratory: Negative  Cardiovascular: Negative  Gastrointestinal: Positive for abdominal pain   " Endocrine: Negative  Genitourinary: Negative  Musculoskeletal: Negative  Skin: Negative  Allergic/Immunologic: Negative  Neurological: Negative  Hematological: Negative  Psychiatric/Behavioral: Negative  All other systems reviewed and are negative  Physical Exam  Physical Exam  Vitals and nursing note reviewed  Constitutional:       Appearance: Normal appearance  HENT:      Head: Normocephalic and atraumatic  Nose: Nose normal       Mouth/Throat:      Mouth: Mucous membranes are moist    Eyes:      Extraocular Movements: Extraocular movements intact  Pupils: Pupils are equal, round, and reactive to light  Cardiovascular:      Rate and Rhythm: Normal rate and regular rhythm  Pulmonary:      Effort: Pulmonary effort is normal       Breath sounds: Normal breath sounds  Abdominal:      General: Abdomen is flat  Bowel sounds are normal       Palpations: Abdomen is soft  Comments: Left upper abdominal tenderness noted no guarding rigidity rebound tenderness   Musculoskeletal:         General: Normal range of motion  Cervical back: Normal range of motion and neck supple  Skin:     General: Skin is warm  Capillary Refill: Capillary refill takes less than 2 seconds  Neurological:      General: No focal deficit present  Mental Status: He is alert and oriented to person, place, and time  Mental status is at baseline  Psychiatric:         Mood and Affect: Mood normal          Thought Content:  Thought content normal          Vital Signs  ED Triage Vitals [04/03/23 1756]   Temperature Pulse Respirations Blood Pressure SpO2   (!) 97 4 °F (36 3 °C) (!) 112 18 (!) 166/102 97 %      Temp src Heart Rate Source Patient Position - Orthostatic VS BP Location FiO2 (%)   -- -- -- -- --      Pain Score       8           Vitals:    04/03/23 1756   BP: (!) 166/102   Pulse: (!) 112         Visual Acuity      ED Medications  Medications - No data to display    Diagnostic Studies  Results Reviewed     None                 No orders to display              Procedures  Procedures         ED Course                                             Medical Decision Making  Labs ordered and reviewed  CT scan of the abdomen pelvis Pending care transition to Dr Devante Miramontes    Abdominal pain: acute illness or injury  Amount and/or Complexity of Data Reviewed  Labs: ordered  Decision-making details documented in ED Course  Radiology: ordered  Risk  Prescription drug management  Disposition  Final diagnoses:   None     ED Disposition     None      Follow-up Information    None         Patient's Medications   Discharge Prescriptions    No medications on file       No discharge procedures on file      PDMP Review     None          ED Provider  Electronically Signed by           Tyler Mckeon DO  04/05/23 3591

## 2023-04-05 ENCOUNTER — OFFICE VISIT (OUTPATIENT)
Dept: OTOLARYNGOLOGY | Facility: CLINIC | Age: 43
End: 2023-04-05

## 2023-04-05 ENCOUNTER — OFFICE VISIT (OUTPATIENT)
Dept: AUDIOLOGY | Facility: CLINIC | Age: 43
End: 2023-04-05

## 2023-04-05 VITALS — WEIGHT: 200 LBS | HEIGHT: 68 IN | TEMPERATURE: 98.1 F | BODY MASS INDEX: 30.31 KG/M2

## 2023-04-05 DIAGNOSIS — R07.0 CHRONIC THROAT PAIN: ICD-10-CM

## 2023-04-05 DIAGNOSIS — M54.2 CHRONIC NECK PAIN: ICD-10-CM

## 2023-04-05 DIAGNOSIS — G89.29 CHRONIC NECK PAIN: ICD-10-CM

## 2023-04-05 DIAGNOSIS — H92.02 OTALGIA OF LEFT EAR: Primary | ICD-10-CM

## 2023-04-05 DIAGNOSIS — G89.29 CHRONIC THROAT PAIN: ICD-10-CM

## 2023-04-05 DIAGNOSIS — Z01.10 NORMAL BEHAVIORAL AUDIOMETRY: Primary | ICD-10-CM

## 2023-04-05 RX ORDER — ERGOCALCIFEROL 1.25 MG/1
CAPSULE ORAL
COMMUNITY
Start: 2023-03-10

## 2023-04-05 RX ORDER — CEFUROXIME AXETIL 500 MG/1
500 TABLET ORAL 2 TIMES DAILY
COMMUNITY
Start: 2023-03-30

## 2023-04-05 RX ORDER — METHYLPREDNISOLONE 4 MG/1
TABLET ORAL
Qty: 1 EACH | Refills: 0 | Status: SHIPPED | OUTPATIENT
Start: 2023-04-05

## 2023-04-05 RX ORDER — FLUTICASONE PROPIONATE 50 MCG
1 SPRAY, SUSPENSION (ML) NASAL 2 TIMES DAILY
Qty: 1 G | Refills: 6 | Status: SHIPPED | OUTPATIENT
Start: 2023-04-05

## 2023-04-05 NOTE — PROGRESS NOTES
Assessment/Plan:    Otalgia of left ear  Symptoms include left otalgia, left sided throat pain, left neck pain occurring for greater than 4 years  Reviewed chronic left otalgia, chronic throat pain, chronic neck pain and possible causes  Discussed ETD, tonsil stones, dental changes, TMJ, vs ear processes  No significant findings on exam today, small piece cerumen removed right ear with suction, no procedure  Prior treatment includes dental repairs, Ct scan neck in 2019, oral antibiotics  Audiogram today indicating normal bilateral hearing, Tymps type A bilaterally  Reassured no CHL or SNHL  Reviewed results and actual images of 06/2019 Ct neck  1   Unremarkable CT of the neck  No findings to suggest Eagle's syndrome as concerned    2   Left maxillary sinus mucous retention cyst and mild inflammatory mucosal thickening of right maxillary sinus  3   A 3 mm nodule in the partially imaged left upper lobe   Based on current Fleischner Society 2017 Guidelines on incidental pulmonary nodule, no routine follow-up is needed for this nodule if the patient is considered low risk for lung cancer  If the   patient is considered high risk for lung cancer, 12 month follow-up non-contrast chest CT is recommended  Options include:  TMJ syndrome - soft diet, jaw rest, NSAIDs, warm compresses, massage, physical therapy, oral appliance by dentist   Consider PT depending on results of Ct neck  Recommend nasal steroids for mucosal thickening noted on Ct scan and to treat any ETD component to ear pain  Oral steroids for addressing inflammation/musculoskeletal source of discomfort  Tonsil stones - Reviewed nature of tonsilliths  No tonsilliths on exam today although pt states can feel behind the tonsil on the left  Bilateral tonsils 1+  Discussed treatment options for tonsillith including manual removal, vs tonsillectomy        Discussed role of repeat imaging for comparison due to worsening otalgia and throat pain  Recommend Laryngoscopy in near future, after imaging  Follow up in 4 weeks with laryngoscopy and tonsil evaluation with Dr Raquel Madrid                Diagnoses and all orders for this visit:    Otalgia of left ear  -     Ambulatory referral to Audiology  -     CT soft tissue neck with contrast; Future  -     fluticasone (FLONASE) 50 mcg/act nasal spray; 1 spray into each nostril 2 (two) times a day  -     methylPREDNISolone 4 MG tablet therapy pack; Use as directed on package    Chronic throat pain  -     CT soft tissue neck with contrast; Future  -     fluticasone (FLONASE) 50 mcg/act nasal spray; 1 spray into each nostril 2 (two) times a day  -     methylPREDNISolone 4 MG tablet therapy pack; Use as directed on package    Chronic neck pain  -     CT soft tissue neck with contrast; Future  -     fluticasone (FLONASE) 50 mcg/act nasal spray; 1 spray into each nostril 2 (two) times a day  -     methylPREDNISolone 4 MG tablet therapy pack; Use as directed on package    Other orders  -     cefuroxime (CEFTIN) 500 mg tablet; Take 500 mg by mouth 2 (two) times a day  -     ergocalciferol (VITAMIN D2) 50,000 units; take 1 capsule by mouth every week with food          Subjective:      Patient ID: Brijesh Christie is a 36 y o  male  Presents today as a new patient due to ear concerns  Left ear pain, worsening  Sensation water and pressure in left ear  No otorrhea  No history of ear surgery  No current hearing aids  Left side of throat feels swollen, sensation infection in throat and ear  Recurrent tonsil stones  Feeling things get caught in pockets in tonsils  Currently treated with oral antibiotics       Previously seen in our office by Dr Ashu Bolton 06/2019 for chronic throat and neck pain  Here today with his mother        The following portions of the patient's history were reviewed and updated as appropriate: allergies, current medications, past family history, past medical history, "past social history, past surgical history and problem list     Review of Systems   Constitutional: Negative  HENT: Positive for ear pain  Negative for congestion, ear discharge, hearing loss, nosebleeds, postnasal drip, rhinorrhea, sinus pressure, sinus pain, sore throat, tinnitus and voice change  Eyes: Negative  Respiratory: Negative for chest tightness and shortness of breath  Cardiovascular: Negative  Gastrointestinal: Negative  Endocrine: Negative  Musculoskeletal: Negative  Skin: Negative for color change  Neurological: Negative for dizziness, numbness and headaches  Psychiatric/Behavioral: Negative  Objective:      Temp 98 1 °F (36 7 °C) (Temporal)   Ht 5' 8\" (1 727 m)   Wt 90 7 kg (200 lb)   BMI 30 41 kg/m²          Physical Exam  Constitutional:       Appearance: He is well-developed  HENT:      Head: Normocephalic  Right Ear: Hearing, tympanic membrane, ear canal and external ear normal  No decreased hearing noted  No drainage or tenderness  Tympanic membrane is not perforated or erythematous  Left Ear: Hearing, tympanic membrane, ear canal and external ear normal  No decreased hearing noted  No drainage or tenderness  Tympanic membrane is not perforated or erythematous  Nose: Nose normal  No nasal deformity or septal deviation  Mouth/Throat:      Mouth: Mucous membranes are not pale and not dry  No oral lesions  Dentition: Normal dentition  Pharynx: Uvula midline  No oropharyngeal exudate  Neck:      Trachea: No tracheal deviation  Cardiovascular:      Rate and Rhythm: Normal rate  Pulmonary:      Effort: Pulmonary effort is normal  No accessory muscle usage or respiratory distress  Musculoskeletal:      Cervical back: Full passive range of motion without pain, normal range of motion and neck supple  Lymphadenopathy:      Cervical: No cervical adenopathy  Skin:     General: Skin is warm and dry     Neurological:      " Mental Status: He is alert and oriented to person, place, and time  Cranial Nerves: No cranial nerve deficit  Sensory: No sensory deficit  Psychiatric:         Behavior: Behavior is cooperative

## 2023-04-05 NOTE — PATIENT INSTRUCTIONS
Flonase (Fluticasone) nasal spray one spray each nostril twice per day for 2 months    TMJ syndrome - soft diet, jaw rest, NSAIDs, warm compresses, massage, mouth , physical therapy, or consultation with dentist     Obtain Ct scan neck and follow up with Dr Leland Wiley afterwards

## 2023-04-05 NOTE — ASSESSMENT & PLAN NOTE
Symptoms include left otalgia, left sided throat pain, left neck pain occurring for greater than 4 years  Reviewed chronic left otalgia, chronic throat pain, chronic neck pain and possible causes  Discussed ETD, tonsil stones, dental changes, TMJ, vs ear processes  No significant findings on exam today, small piece cerumen removed right ear with suction, no procedure  Prior treatment includes dental repairs, Ct scan neck in 2019, oral antibiotics  Audiogram today indicating normal bilateral hearing, Tymps type A bilaterally  Reassured no CHL or SNHL  Reviewed results and actual images of 06/2019 Ct neck  1   Unremarkable CT of the neck  No findings to suggest Eagle's syndrome as concerned    2   Left maxillary sinus mucous retention cyst and mild inflammatory mucosal thickening of right maxillary sinus  3   A 3 mm nodule in the partially imaged left upper lobe   Based on current Fleischner Society 2017 Guidelines on incidental pulmonary nodule, no routine follow-up is needed for this nodule if the patient is considered low risk for lung cancer  If the   patient is considered high risk for lung cancer, 12 month follow-up non-contrast chest CT is recommended  Options include:  TMJ syndrome - soft diet, jaw rest, NSAIDs, warm compresses, massage, physical therapy, oral appliance by dentist   Consider PT depending on results of Ct neck  Recommend nasal steroids for mucosal thickening noted on Ct scan and to treat any ETD component to ear pain  Oral steroids for addressing inflammation/musculoskeletal source of discomfort  Tonsil stones - Reviewed nature of tonsilliths  No tonsilliths on exam today although pt states can feel behind the tonsil on the left  Bilateral tonsils 1+  Discussed treatment options for tonsillith including manual removal, vs tonsillectomy  Discussed role of repeat imaging for comparison due to worsening otalgia and throat pain    Recommend Laryngoscopy in near future, after imaging        Follow up in 4 weeks with laryngoscopy and tonsil evaluation with Dr Lonnie Aase

## 2023-04-05 NOTE — PROGRESS NOTES
ADULT ENT HEARING EVALUATION - [place] [address] AUDIOLOGY      Patient Name: German Ocasio   MRN:  [id number]   :  1980   Age: 36 y o  Gender: male   DOS: 2023     HISTORY:     German Ocasio, a 36 y o  male, was seen on 2023 at the referral of NETTA Moya,  for an evaluation of hearing as part of his ENT visit  Mr Kauffman primary complaint is left otalgia  He denied otorrhea, aural fullness, tinnitus and dizziness  Mr Aleksandra Yates has not had his hearing tested previously  RESULTS:    Otoscopic Evaluation:   Right Ear: Unremarkable, canal clear   Left Ear: Unremarkable, canal clear    Tympanometry:   Right Ear: Type A; normal middle ear pressure and static compliance    Left Ear: Type A; normal middle ear pressure and static compliance     Audiometry:  Conventional pure tone audiometry from 250 - 8000 Hz  obtained with good reliability and revealed the following:     Right Ear: normal hearing sensitivity   Left Ear: normal hearing sensitivity     Speech Audiometry:    Speech Reception (SRT)   Right Ear: 10 dB HL   Left Ear: 5 dB HL    Word Recognition Scores (WRS):  Right Ear: excellent (100 % correct)     Left Ear: excellent (100 % correct)   Stimuli: [id number]    *see attached audiogram*      RECOMMENDATIONS:    1 ) Follow-up with referring provider  2 ) Further audiometric testing PRN  It was a pleasure working with Mr Aleksandra Yates today  Thank you for referring this patient  Charlie James    Clinical Audiologist    [address] [place] [id number]

## 2023-04-20 PROBLEM — E55.9 VITAMIN D DEFICIENCY: Status: ACTIVE | Noted: 2023-04-20

## 2023-04-20 PROBLEM — K21.9 GASTROESOPHAGEAL REFLUX DISEASE WITHOUT ESOPHAGITIS: Status: ACTIVE | Noted: 2023-04-20

## 2023-04-20 PROBLEM — E78.5 DYSLIPIDEMIA: Status: ACTIVE | Noted: 2023-04-20

## 2023-04-21 PROBLEM — I10 PRIMARY HYPERTENSION: Status: ACTIVE | Noted: 2023-04-21

## 2023-04-25 ENCOUNTER — OFFICE VISIT (OUTPATIENT)
Dept: PHYSICAL THERAPY | Facility: CLINIC | Age: 43
End: 2023-04-25

## 2023-04-25 DIAGNOSIS — M54.2 CHRONIC NECK PAIN: Primary | ICD-10-CM

## 2023-04-25 DIAGNOSIS — G89.29 CHRONIC NECK PAIN: Primary | ICD-10-CM

## 2023-04-25 DIAGNOSIS — G89.29 CHRONIC THROAT PAIN: ICD-10-CM

## 2023-04-25 DIAGNOSIS — R07.0 CHRONIC THROAT PAIN: ICD-10-CM

## 2023-04-25 NOTE — PROGRESS NOTES
Daily Note     Today's date: 2023  Patient name: Juana Hutchinson  : 1980  MRN: [id number]  Referring provider: NETTA Thurston  Dx:   Encounter Diagnosis     ICD-10-CM    1  Chronic neck pain  M54 2     G89 29       2  Chronic throat pain  R07 0     G89 29                      Subjective: I am more aware of my posture, and doing the HEP  Objective: See treatment diary below      Assessment: Tolerated treatment well  Patient demonstrated fatigue post treatment and exhibited good technique with therapeutic exercises      Plan: Continue per plan of care        Precautions: Chronic tonsil infection      Manuals                                                                 Neuro Re-Ed             Neurac supine pelvic lift 2x5            Neurac Bridge 2x5            Neurac SDLY hip ADD/ABD 2x5            Neurac cerv retract supine 2x5            Neurac scap retract supine 2x5            Neuac scap retract w depression sit 2x5                         Ther Ex                                                                                                                     Ther Activity                                       Gait Training                                       Modalities

## 2023-04-27 ENCOUNTER — OFFICE VISIT (OUTPATIENT)
Dept: FAMILY MEDICINE CLINIC | Facility: CLINIC | Age: 43
End: 2023-04-27

## 2023-04-27 VITALS
BODY MASS INDEX: 31.37 KG/M2 | TEMPERATURE: 97 F | SYSTOLIC BLOOD PRESSURE: 132 MMHG | DIASTOLIC BLOOD PRESSURE: 80 MMHG | OXYGEN SATURATION: 96 % | WEIGHT: 207 LBS | HEIGHT: 68 IN | HEART RATE: 84 BPM | RESPIRATION RATE: 16 BRPM

## 2023-04-27 DIAGNOSIS — M54.2 CHRONIC NECK PAIN: ICD-10-CM

## 2023-04-27 DIAGNOSIS — M79.10 MYALGIA: ICD-10-CM

## 2023-04-27 DIAGNOSIS — R23.8 SKIN SENSITIVITY: ICD-10-CM

## 2023-04-27 DIAGNOSIS — D72.829 LEUKOCYTOSIS, UNSPECIFIED TYPE: ICD-10-CM

## 2023-04-27 DIAGNOSIS — G89.29 CHRONIC THROAT PAIN: ICD-10-CM

## 2023-04-27 DIAGNOSIS — R07.0 CHRONIC THROAT PAIN: ICD-10-CM

## 2023-04-27 DIAGNOSIS — G89.29 CHRONIC NECK PAIN: ICD-10-CM

## 2023-04-27 DIAGNOSIS — M25.50 POLYARTHRALGIA: Primary | ICD-10-CM

## 2023-04-27 NOTE — PATIENT INSTRUCTIONS
Schedule appt with rheumatology , Dr Angie Nuñez, to see if there are any additional tests that can be done for current symptoms  Your white blood count remains mildly elevated and I suspect that is related to your issues with your throat  Ct scan of neck was reviewed with no significant findings  Follow up with ENT as scheduled

## 2023-04-27 NOTE — PROGRESS NOTES
"Name: Deborah Ontiveros      : 1980      MRN: [id number]  Encounter Provider: NETTA Roberson  Encounter Date: 2023   Encounter department: [address] [place] [address]   1  Polyarthralgia  - Ambulatory Referral to Rheumatology; Future    2  Myalgia  - Ambulatory Referral to Rheumatology; Future    3  Chronic throat pain  Being managed by ENT    4  Chronic neck pain  Continue PT  Monitor  5  Leukocytosis, unspecified type  Mild elevation most likely secondary to recurrent throat infections  6  Skin sensitivity  - Ambulatory Referral to Rheumatology; Future     all testing to date negative  Labs wnl  Exam benign  Offered referral to rheum to see if there are additional diagnostics  Patient and mother counseled regarding instructions for management which included: impression/diagnosis, risk/benefits of treatment options, importance of compliance with treatment, risk factor reduction, and prognosis  I have reviewed the instructions with the patient and mother answering all questions and patient and mother verbalized understanding  Subjective      Here for follow up and lab review  C/o \"pain all over\"   States pain joints, muscles, skin very sensitive  Pt believes he has \"cancer\"  Father  lymphoma  Accompanied by mother  Pt \"would like a test for any type of cancer\"  Chronic throat pain  Seeing ENT  Recurrent \"throat infections\"  Per pt and mother, ENT considering tonsillectomy  No vomiting, no diarrhea, no fevers  No unusual weight loss  No chest pain or sob  States \"all lymph nodes are enlarged\"  Review of Systems   Constitutional: Negative for fever and unexpected weight change  HENT: Positive for sore throat  Negative for congestion  Respiratory: Negative for cough and shortness of breath  Cardiovascular: Negative for chest pain and palpitations  Gastrointestinal: Negative for diarrhea and vomiting     Genitourinary: " "Negative for dysuria and frequency  Musculoskeletal: Positive for arthralgias, myalgias and neck pain  Skin: Negative for rash and wound  Neurological: Negative for dizziness and headaches  Hematological: Positive for adenopathy (at times, \"all  over\")         Current Outpatient Medications on File Prior to Visit   Medication Sig   • amLODIPine (NORVASC) 10 mg tablet Take 1 tablet by mouth daily   • ergocalciferol (VITAMIN D2) 50,000 units take 1 capsule by mouth every week with food   • fenofibrate (TRICOR) 145 mg tablet Take 1 tablet by mouth daily   • fluticasone (FLONASE) 50 mcg/act nasal spray 1 spray into each nostril 2 (two) times a day   • lisinopril (ZESTRIL) 10 mg tablet Take 1 tablet by mouth daily   • rosuvastatin (CRESTOR) 10 MG tablet Take 10 mg by mouth daily   • naproxen (Naprosyn) 500 mg tablet Take 1 tablet (500 mg total) by mouth 2 (two) times a day with meals (Patient not taking: Reported on 4/21/2023)       Objective     Recent Results (from the past 672 hour(s))   CBC and differential    Collection Time: 04/03/23  6:40 PM   Result Value Ref Range    WBC 13 72 (H) 4 31 - 10 16 Thousand/uL    RBC 5 82 (H) 3 88 - 5 62 Million/uL    Hemoglobin 17 4 (H) 12 0 - 17 0 g/dL    Hematocrit 50 5 (H) 36 5 - 49 3 %    MCV 87 82 - 98 fL    MCH 29 9 26 8 - 34 3 pg    MCHC 34 5 31 4 - 37 4 g/dL    RDW 12 3 11 6 - 15 1 %    MPV 9 4 8 9 - 12 7 fL    Platelets 474 278 - 367 Thousands/uL    nRBC 0 /100 WBCs    Neutrophils Relative 66 43 - 75 %    Immat GRANS % 1 0 - 2 %    Lymphocytes Relative 25 14 - 44 %    Monocytes Relative 7 4 - 12 %    Eosinophils Relative 1 0 - 6 %    Basophils Relative 0 0 - 1 %    Neutrophils Absolute 9 05 (H) 1 85 - 7 62 Thousands/µL    Immature Grans Absolute 0 10 0 00 - 0 20 Thousand/uL    Lymphocytes Absolute 3 37 0 60 - 4 47 Thousands/µL    Monocytes Absolute 0 98 0 17 - 1 22 Thousand/µL    Eosinophils Absolute 0 17 0 00 - 0 61 Thousand/µL    Basophils Absolute 0 05 0 00 - 0 10 " Thousands/µL   Comprehensive metabolic panel    Collection Time: 04/03/23  6:40 PM   Result Value Ref Range    Sodium 136 135 - 147 mmol/L    Potassium 3 6 3 5 - 5 3 mmol/L    Chloride 102 96 - 108 mmol/L    CO2 25 21 - 32 mmol/L    ANION GAP 9 4 - 13 mmol/L    BUN 13 5 - 25 mg/dL    Creatinine 0 85 0 60 - 1 30 mg/dL    Glucose 121 65 - 140 mg/dL    Calcium 9 2 8 4 - 10 2 mg/dL    AST 17 13 - 39 U/L    ALT 25 7 - 52 U/L    Alkaline Phosphatase 98 34 - 104 U/L    Total Protein 7 9 6 4 - 8 4 g/dL    Albumin 4 9 3 5 - 5 0 g/dL    Total Bilirubin 0 55 0 20 - 1 00 mg/dL    eGFR 106 ml/min/1 73sq m   Magnesium    Collection Time: 04/03/23  6:40 PM   Result Value Ref Range    Magnesium 2 1 1 9 - 2 7 mg/dL   Lipase    Collection Time: 04/03/23  6:40 PM   Result Value Ref Range    Lipase 18 11 - 82 u/L   Lipid panel    Collection Time: 04/21/23  1:46 PM   Result Value Ref Range    Cholesterol 238 (H) See Comment mg/dL    Triglycerides 267 (H) See Comment mg/dL    HDL, Direct 30 (L) >=40 mg/dL    LDL Calculated 155 (H) 0 - 100 mg/dL    Non-HDL-Chol (CHOL-HDL) 208 mg/dl   Hepatitis C antibody    Collection Time: 04/21/23  1:46 PM   Result Value Ref Range    Hepatitis C Ab Non-reactive Non-Reactive   CBC and differential    Collection Time: 04/21/23  1:46 PM   Result Value Ref Range    WBC 12 37 (H) 4 31 - 10 16 Thousand/uL    RBC 5 69 (H) 3 88 - 5 62 Million/uL    Hemoglobin 16 6 12 0 - 17 0 g/dL    Hematocrit 49 9 (H) 36 5 - 49 3 %    MCV 88 82 - 98 fL    MCH 29 2 26 8 - 34 3 pg    MCHC 33 3 31 4 - 37 4 g/dL    RDW 12 7 11 6 - 15 1 %    MPV 10 7 8 9 - 12 7 fL    Platelets 824 055 - 682 Thousands/uL    nRBC 0 /100 WBCs    Neutrophils Relative 67 43 - 75 %    Immat GRANS % 1 0 - 2 %    Lymphocytes Relative 21 14 - 44 %    Monocytes Relative 8 4 - 12 %    Eosinophils Relative 2 0 - 6 %    Basophils Relative 1 0 - 1 %    Neutrophils Absolute 8 39 (H) 1 85 - 7 62 Thousands/µL    Immature Grans Absolute 0 11 0 00 - 0 20 Thousand/uL Lymphocytes Absolute 2 62 0 60 - 4 47 Thousands/µL    Monocytes Absolute 0 95 0 17 - 1 22 Thousand/µL    Eosinophils Absolute 0 24 0 00 - 0 61 Thousand/µL    Basophils Absolute 0 06 0 00 - 0 10 Thousands/µL   Vitamin D 25 hydroxy    Collection Time: 04/21/23  1:46 PM   Result Value Ref Range    Vit D, 25-Hydroxy 38 6 30 0 - 100 0 ng/mL   Hemoglobin A1C    Collection Time: 04/21/23  1:46 PM   Result Value Ref Range    Hemoglobin A1C 5 5 Normal 3 8-5 6%; PreDiabetic 5 7-6 4%;  Diabetic >=6 5%; Glycemic control for adults with diabetes <7 0% %     mg/dl   Comprehensive metabolic panel    Collection Time: 04/21/23  1:46 PM   Result Value Ref Range    Sodium 135 135 - 147 mmol/L    Potassium 3 9 3 5 - 5 3 mmol/L    Chloride 107 96 - 108 mmol/L    CO2 24 21 - 32 mmol/L    ANION GAP 4 4 - 13 mmol/L    BUN 15 5 - 25 mg/dL    Creatinine 1 05 0 60 - 1 30 mg/dL    Glucose 108 65 - 140 mg/dL    Calcium 9 4 8 3 - 10 1 mg/dL    AST 23 5 - 45 U/L    ALT 52 12 - 78 U/L    Alkaline Phosphatase 95 46 - 116 U/L    Total Protein 8 3 6 4 - 8 4 g/dL    Albumin 4 4 3 5 - 5 0 g/dL    Total Bilirubin 0 47 0 20 - 1 00 mg/dL    eGFR 86 ml/min/1 73sq m   C-reactive protein    Collection Time: 04/21/23  1:46 PM   Result Value Ref Range    CRP <3 0 <3 0 mg/L   CK    Collection Time: 04/21/23  1:46 PM   Result Value Ref Range    Total  39 - 308 U/L   Vitamin B12    Collection Time: 04/21/23  1:46 PM   Result Value Ref Range    Vitamin B-12 428 100 - 900 pg/mL   HIV 1/2 AG/AB w Reflex [facility] for 2 yr old and above    Collection Time: 04/21/23  6:13 PM   Result Value Ref Range    HIV-1 p24 Antigen Non-Reactive Non-Reactive    HIV-1 Antibody Non-Reactive Non-Reactive    HIV-2 Antibody Non-Reactive Non-Reactive    HIV Ag-Ab 5th Gen Non-Reactive Non-Reactive   IMELDA Screen w/ Reflex to Titer/Pattern    Collection Time: 04/22/23 11:26 AM   Result Value Ref Range    IMELDA Negative Negative   RF Screen w/ Reflex to Titer    Collection Time: "04/22/23 12:28 PM   Result Value Ref Range    Rheumatoid Factor Negative Negative     Reviewed lab/diagnostic results with pt including both normal and abnormal findings  In depth counseling and instructions given  All questions answered during visit  /80   Pulse 84   Temp (!) 97 °F (36 1 °C) (Temporal)   Resp 16   Ht 5' 8\" (1 727 m)   Wt 93 9 kg (207 lb)   SpO2 96%   BMI 31 47 kg/m²     Physical Exam  Vitals reviewed  Constitutional:       General: He is not in acute distress  Appearance: He is not ill-appearing  Eyes:      General: No scleral icterus  Cardiovascular:      Rate and Rhythm: Normal rate and regular rhythm  Pulmonary:      Effort: Pulmonary effort is normal  No respiratory distress  Breath sounds: Normal breath sounds  No wheezing or rales  Abdominal:      General: There is no distension  Palpations: Abdomen is soft  Musculoskeletal:      Cervical back: Normal range of motion and neck supple  Lymphadenopathy:      Head:      Right side of head: No submental, submandibular, tonsillar, preauricular, posterior auricular or occipital adenopathy  Left side of head: No submental, submandibular, tonsillar, preauricular, posterior auricular or occipital adenopathy  Cervical: No cervical adenopathy  Right cervical: No superficial or posterior cervical adenopathy  Left cervical: No superficial or posterior cervical adenopathy  Upper Body:      Right upper body: No supraclavicular or axillary adenopathy  Left upper body: No supraclavicular or axillary adenopathy  Skin:     General: Skin is warm and dry  Coloration: Skin is not jaundiced or pale  Neurological:      General: No focal deficit present  Mental Status: He is alert and oriented to person, place, and time  Cranial Nerves: No cranial nerve deficit  Sensory: No sensory deficit  Psychiatric:      Comments: Affect flat  Well groomed   Dressed appropriately " for the weather  Calm             NETTA Capellan

## 2023-05-04 ENCOUNTER — TELEPHONE (OUTPATIENT)
Dept: CARDIOLOGY CLINIC | Facility: CLINIC | Age: 43
End: 2023-05-04

## 2023-05-04 ENCOUNTER — OFFICE VISIT (OUTPATIENT)
Dept: OTOLARYNGOLOGY | Facility: CLINIC | Age: 43
End: 2023-05-04

## 2023-05-04 VITALS — HEIGHT: 68 IN | BODY MASS INDEX: 31.07 KG/M2 | WEIGHT: 205 LBS | TEMPERATURE: 98.1 F

## 2023-05-04 DIAGNOSIS — G52.1 GLOSSOPHARYNGEAL NEURALGIA: Primary | ICD-10-CM

## 2023-05-04 RX ORDER — AMITRIPTYLINE HYDROCHLORIDE 10 MG/1
10 TABLET, FILM COATED ORAL
Qty: 30 TABLET | Refills: 2 | Status: SHIPPED | OUTPATIENT
Start: 2023-05-04

## 2023-05-04 NOTE — PROGRESS NOTES
Assessment/Plan:  The patient has been suffering from constant pain in his throat and left ear x four years  He has been under the impression that it is because of something he swallowed when he was working under the car, leaving an embedded foreign body causing a chronic infection and inflammation in his throat  I believe rather that he suffered a musculoskeletal injury at that time, precipitating a glossopharyngeal or similar neuralgia, and possibly with a contribution from underlying cervical neuralgia (his MRI shows cervical disc disease)  His CT of the neck, recently and in 2019, were unremarkable  His ENT exam, and endoscopy, are unremarkable  The patient reports no improvement in his symptoms with any treatment or medication so far  He denies any improvement with gabapentin  The patient is interested in a tonsillectomy, but I don't believe that would help  I think it is worth trying other medications before considering a surgery with doubtful success  Trial of Elavil QHS  Pt referred to [facility], and Neurology as well  F/u in 3 wks  Diagnosis ICD-10-CM Associated Orders   1  Glossopharyngeal neuralgia  G52 1 amitriptyline (ELAVIL) 10 mg tablet     Ambulatory referral to Neurology             Subjective:      Patient ID: Kp Harris is a 36 y o  male  Pt with c/o stabbing pain in the left side of his head and throat x 4 yrs  He believes it began when he was working under a car, and a muffler fell on him and he accidentally swallowed some debris  He describes the pain as in the left side of his soft palate, toward his left ear, sometimes with left sided headaches as well  It is worse with swallowing        The following portions of the patient's history were reviewed and updated as appropriate: allergies, current medications, past family history, past medical history, past social history, past surgical history and problem list     Review of Systems "    Objective:      Temp 98 1 °F (36 7 °C) (Temporal)   Ht 5' 8\" (1 727 m)   Wt 93 kg (205 lb)   BMI 31 17 kg/m²          Physical Exam  Constitutional:       Appearance: He is well-developed  HENT:      Head: Normocephalic and atraumatic  Right Ear: Tympanic membrane, ear canal and external ear normal  No drainage  No middle ear effusion  Left Ear: Tympanic membrane, ear canal and external ear normal  No drainage  No middle ear effusion  Nose: Nose normal       Mouth/Throat:      Pharynx: Uvula midline  No oropharyngeal exudate  Tonsils: 2+ on the right  2+ on the left  Neck:      Thyroid: No thyroid mass or thyromegaly  Trachea: Trachea normal  No tracheal deviation  Lymphadenopathy:      Cervical: No cervical adenopathy  Neurological:      Mental Status: He is alert  Flexible Fiberoptic Nasolaryngoscopy Procedure Note:  Indication:  Throat pain  Verbal consent obtained  Surgeon: Kelly Diallo MD  Anesthesia: 4% lidocaine, oxymetazoline  Scope passed through nasal cavities bilaterally    Right Nasal Cavity:  Mucosa: normal  Secretions: clear  Left Nasal Cavity:  Mucosa: normal  Secretions: clear  Nasopharynx: unremarkable  Oropharynx: unremarkable  Hypopharynx/Larynx:   Vocal fold mobility = nl   Laryngeal edema  = none   Laryngeal erythema = none   Vocal folds = nl   Valleculae= clear   Piriform Sinuses= clear  Other findings = none  Patient tolerated procedure well without complications    "

## 2023-05-19 ENCOUNTER — CONSULT (OUTPATIENT)
Age: 43
End: 2023-05-19

## 2023-05-19 VITALS
DIASTOLIC BLOOD PRESSURE: 78 MMHG | TEMPERATURE: 98.2 F | HEART RATE: 78 BPM | SYSTOLIC BLOOD PRESSURE: 135 MMHG | BODY MASS INDEX: 30.87 KG/M2 | WEIGHT: 203 LBS

## 2023-05-19 DIAGNOSIS — G52.1 GLOSSOPHARYNGEAL NEURALGIA: ICD-10-CM

## 2023-05-19 NOTE — PROGRESS NOTES
Assessment:  1  Glossopharyngeal neuralgia        Plan:  Mr Jone Kinney is a pleasant 79-year-old significant past medical history of Lyme's, chronic neck and recently worsening throat and left ear pain that patient attributes to exhaust pipe debris and fumes while working on a car that he inhaled  Continues to follow with ENT Dr Preet Bush with follow-up appointment next week  On further questioning patient reports pain diffusely throughout his body with the majority the pain in the anterior neck, throat with difficulty swallowing, left anterolateral thigh and anteromedial left calf  He is pending consultation and referrals with neurology and rheumatology  In regards to the posterior neck which he was initially granted referral for he is not reporting any pain rather just some mild stiffness in the posterior neck  On CT soft tissue cervical he does have very mild degenerative changes at C5-C6 otherwise no osseous abnormalities  Advised patient no interventional approaches will be considered at this time and urged him to continue follow-up with neurology and rheumatology regarding questionable autoimmune process given the diffuse and generalized nature of his pain complaints  He is currently taking Elavil for neuropathic pain control but reports minimal relief  Would consider Cymbalta if he continues to not get any relief from Elavil at a higher dose  We will continue to follow-up as needed but nothing further to add at this time  History of Present Illness:    Serenity Bailon is a 36 y o  male who presents to [facility] for initial evaluation of the above stated pain complaints  The patient has a past medical and chronic pain history as outlined in the assessment section  He was referred by Lucinda Massey MD  [address] [place] [address]  patient has been referred by Dr Preet Bush regarding chronic neck pain of several years duration    During today's evaluation patient reports majority of his pain is anterior neck with difficulty swallowing that he relates back to swallowing exhaust pipe debris while working on a car and has since seen ENT regarding the issue  Today patient reports moderate to severe pain rated 8 out of 10 and interfere with activities  Pain is nearly constant 60 to 95% of the time that is present throughout the day and night  Describes symptoms as pressure-like, sharp, stabbing pain  Also reports upper and lower extremity weakness but denies falls  Does not use any durable medical equipment for ambulation  Symptoms are worse with prayer  Has had moderate relief with ice and TENS unit and no significant improvements with physical therapy  Denies smoking, marijuana or alcohol use  Currently taking naproxen and Elavil with no significant improvements  Presents today for initial evaluation  Review of Systems:    Review of Systems   Constitutional: Positive for chills, fatigue and fever  HENT: Negative for ear pain, mouth sores and sinus pressure  Eyes: Negative for pain, redness and visual disturbance  Respiratory: Positive for chest tightness  Negative for shortness of breath and wheezing  Cardiovascular: Positive for chest pain  Negative for palpitations  Gastrointestinal: Negative for abdominal pain and nausea  Endocrine: Negative for polyphagia  Musculoskeletal: Positive for back pain and gait problem  Negative for arthralgias and neck pain  Joint pain, muscle pain, Joint swelling   Skin: Negative for wound  Neurological: Positive for dizziness, weakness and light-headedness  Negative for seizures  Psychiatric/Behavioral: Positive for decreased concentration and sleep disturbance  Negative for dysphoric mood  Past Medical History:   Diagnosis Date   • Chest pain    • High blood pressure    • High cholesterol        History reviewed  No pertinent surgical history      Family History   Problem Relation Age of Onset   • No Known Problems Mother    • No Known Problems Father        Social History     Occupational History   • Not on file   Tobacco Use   • Smoking status: Never   • Smokeless tobacco: Never   Vaping Use   • Vaping Use: Never used   Substance and Sexual Activity   • Alcohol use: Never   • Drug use: Never   • Sexual activity: Not Currently         Current Outpatient Medications:   •  amitriptyline (ELAVIL) 10 mg tablet, Take 1 tablet (10 mg total) by mouth daily at bedtime, Disp: 30 tablet, Rfl: 2  •  amLODIPine (NORVASC) 10 mg tablet, Take 1 tablet by mouth daily, Disp: , Rfl:   •  ergocalciferol (VITAMIN D2) 50,000 units, take 1 capsule by mouth every week with food, Disp: , Rfl:   •  fenofibrate (TRICOR) 145 mg tablet, Take 1 tablet by mouth daily, Disp: , Rfl:   •  fluticasone (FLONASE) 50 mcg/act nasal spray, 1 spray into each nostril 2 (two) times a day, Disp: 1 g, Rfl: 6  •  lisinopril (ZESTRIL) 10 mg tablet, Take 1 tablet by mouth daily, Disp: , Rfl:   •  naproxen (Naprosyn) 500 mg tablet, Take 1 tablet (500 mg total) by mouth 2 (two) times a day with meals, Disp: 30 tablet, Rfl: 0  •  rosuvastatin (CRESTOR) 10 MG tablet, Take 10 mg by mouth daily, Disp: , Rfl:     Allergies   Allergen Reactions   • Penicillins Hives     Reaction Date: 11Feb2005;      • Shellfish-Derived Products - Food Allergy Itching       Physical Exam:    /78   Pulse 78   Temp 98 2 °F (36 8 °C)   Wt 92 1 kg (203 lb)   BMI 30 87 kg/m²     Constitutional: normal, well developed, well nourished, alert, in no distress and non-toxic and no overt pain behavior    Eyes: anicteric  HEENT: grossly intact  Neck: supple, symmetric, trachea midline and no masses   Pulmonary:even and unlabored  Cardiovascular:No edema or pitting edema present  Skin:Normal without rashes or lesions and well hydrated  Psychiatric:Mood and affect appropriate  Neurologic:Cranial Nerves II-XII grossly intact  Musculoskeletal:normal gait, active and passive range of motion cervical spine within normal limits, MMT 5-5 bilateral upper extremities, sensation grossly tact to light touch, DTRs within normal limits, Spurling test negative, cervical compression testing negative bilaterally    Imaging  CT soft tissue neck with contrast  Status: Final result     PACS Images     Show images for CT soft tissue neck with contrast  Study Result    Narrative & Impression   CT NECK WITH CONTRAST     INDICATION:   H92 02: Otalgia, left ear  R07 0: Pain in throat  G89 29: Other chronic pain  M54 2: Cervicalgia  G89 29: Other chronic pain      COMPARISON:  None      TECHNIQUE:  Axial, sagittal, and coronal 2D reformatted images were created from the axial source data and submitted for interpretation      Radiation dose length product (DLP) for this visit:  737 04 mGy-cm     This examination, like all CT scans performed in the [facility], was performed utilizing techniques to minimize radiation dose exposure, including the use of iterative   reconstruction and automated exposure control      IV Contrast:  85 mL of iohexol (OMNIPAQUE)     IMAGE QUALITY:  Diagnostic      FINDINGS:     VISUALIZED BRAIN PARENCHYMA:  No acute intracranial pathology of the visualized brain parenchyma      VISUALIZED ORBITS: Normal visualized orbits      PARANASAL SINUSES: There is mucosal thickening within the inferior aspect of the left maxillary sinus      NASAL CAVITY AND NASOPHARYNX:  Normal      SUPRAHYOID NECK:  Normal oral cavity, tongue base, tonsillar fossa and epiglottis      INFRAHYOID NECK:  Aryepiglottic folds and piriform sinuses are normal   Normal glottis and subglottic airway      THYROID GLAND:  Unremarkable      PAROTID AND SUBMANDIBULAR GLANDS:  Normal      LYMPH NODES:  No pathologic or enlarged adenopathy      VASCULAR STRUCTURES:  Normal enhancement of the cervical vasculature      THORACIC INLET:  Lung apices and upper mediastinum are unremarkable      BONY STRUCTURES: Minor cervical degenerative change at the C5-6 level      IMPRESSION:     Unremarkable CT of the neck  No soft tissue mass or pathologic adenopathy      Mild mucosal thickening/retention cyst involving the inferior left maxillary sinus            Workstation performed: [id number]        Imaging    CT soft tissue neck with contrast (Order: [id number]) - 4/13/2023    Result History    CT soft tissue neck with contrast (Order #[id number]) on 4/17/2023 - Order Result History Report    Order Report     Order Details    Order Questions    Question Answer   What is the patient's sedation requirement? No Sedation   Contrast information: IV   Did the patient ever have a reaction to x-ray dye? If yes, please verify the type of allergy and order the contrast allergy prep  No   Note: If yes, please verify the type of allergy and order the contrast allergy prep  Release to patient through Mychart Immediate   Exam reason chronic throat pain, left otalgia   Note: Enter reason for exam            Result Information    Status Priority Source   Final result (4/17/2023  2:43 PM) Routine      Reason for Exam    chronic throat pain, left otalgia; chronic throat pain, left otalgia   Dx: Otalgia of left ear [H92 02 (ICD-10-CM)]; Chronic throat pain [R07 0, G89 29 (ICD-10-CM)]; Chronic neck pain [M54 2, G89 29 (ICD-10-CM)]     All Reviewers List    [address] on 4/17/2023  5:00 PM       CT soft tissue neck with contrast: Patient Communication     Edit Comments   Add Notifications       Ct neck overall normal   Referral order to physical therapy to address musculoskeletal concerns   I also put in an order for referral to neurology for headache variant evaluation     Written by NETTA Haynes on 4/17/2023  4:57 PM EDT      Signed by    Signed Date/Time  Phone Pager   Jenny Hughes 4/17/2023 14:43 [phone]      Exam Information    Status Exam Begun  Exam Ended  Performing Tech Final [99] 4/13/2023 08:01 4/13/2023 08:12 Carolyne Box     Screening Form Questions    No questions have been answered for this form  External Results Report    Open External Results Report      Encounter    View Encounter               Patient Care Timeline    No data selected in time range    Pre-op Summary    Pre-op             Recovery Summary    Recovery               Routing History    None         No orders to display       No orders of the defined types were placed in this encounter

## 2023-05-25 ENCOUNTER — OFFICE VISIT (OUTPATIENT)
Dept: OTOLARYNGOLOGY | Facility: CLINIC | Age: 43
End: 2023-05-25

## 2023-05-25 VITALS — BODY MASS INDEX: 30.31 KG/M2 | HEIGHT: 68 IN | TEMPERATURE: 97.6 F | WEIGHT: 200 LBS

## 2023-05-25 DIAGNOSIS — G90.09: ICD-10-CM

## 2023-05-25 DIAGNOSIS — G52.1 GLOSSOPHARYNGEAL NEURALGIA: Primary | ICD-10-CM

## 2023-05-25 DIAGNOSIS — R07.0 CHRONIC THROAT PAIN: ICD-10-CM

## 2023-05-25 DIAGNOSIS — H92.02 OTALGIA OF LEFT EAR: ICD-10-CM

## 2023-05-25 DIAGNOSIS — G89.29 CHRONIC THROAT PAIN: ICD-10-CM

## 2023-05-25 RX ORDER — CARBAMAZEPINE 200 MG/1
200 TABLET, EXTENDED RELEASE ORAL 2 TIMES DAILY
Qty: 30 TABLET | Refills: 2 | Status: SHIPPED | OUTPATIENT
Start: 2023-05-25

## 2023-05-25 NOTE — PROGRESS NOTES
"Assessment/Plan:  I still believe the patient is suffering from a chronic neuralgia, either glossopharyngeal, sphenopalatine, or pterygopalatine  He has failed a trial of gabapentin and a trial of Elavil, so I have prescribed carbamazepine  I have also ordered a new MRI, to focus on the relevant ganglion areas  F/u in 2 wks  His consult with neurology is still pending  Diagnosis ICD-10-CM Associated Orders   1  Glossopharyngeal neuralgia  G52 1 carBAMazepine (TEGretol XR) 200 mg 12 hr tablet     MRI brain w wo contrast      2  Sphenopalatine ganglion neuralgia  G90 09 MRI brain w wo contrast      3  Chronic throat pain  R07 0     G89 29       4  Otalgia of left ear  H92 02              Subjective:      Patient ID: Pavan Palacio is a 36 y o  male  The patient returns for f/u of left throat and ear pain  He has been taking the Elavil QHS, he does not notice any improvement  He even thinks the pain has gotten worse, in that it has become less intermittent and more constant  The following portions of the patient's history were reviewed and updated as appropriate: allergies, current medications, past family history, past medical history, past social history, past surgical history and problem list     Review of Systems      Objective:      Temp 97 6 °F (36 4 °C) (Temporal)   Ht 5' 8\" (1 727 m)   Wt 90 7 kg (200 lb)   BMI 30 41 kg/m²          Physical Exam  Constitutional:       Appearance: He is well-developed  HENT:      Head: Normocephalic and atraumatic  Right Ear: Tympanic membrane, ear canal and external ear normal  No drainage  No middle ear effusion  Left Ear: Tympanic membrane, ear canal and external ear normal  No drainage  No middle ear effusion  Nose: Nose normal       Mouth/Throat:      Pharynx: Uvula midline  No oropharyngeal exudate  Tonsils: 2+ on the right  2+ on the left  Neck:      Thyroid: No thyroid mass or thyromegaly        Trachea: Trachea normal  No " tracheal deviation  Lymphadenopathy:      Cervical: No cervical adenopathy  Neurological:      Mental Status: He is alert

## 2023-06-15 ENCOUNTER — CONSULT (OUTPATIENT)
Dept: CARDIOLOGY CLINIC | Facility: CLINIC | Age: 43
End: 2023-06-15
Payer: MEDICARE

## 2023-06-15 VITALS
WEIGHT: 199 LBS | OXYGEN SATURATION: 97 % | SYSTOLIC BLOOD PRESSURE: 128 MMHG | BODY MASS INDEX: 30.16 KG/M2 | HEART RATE: 76 BPM | DIASTOLIC BLOOD PRESSURE: 88 MMHG | HEIGHT: 68 IN

## 2023-06-15 DIAGNOSIS — I10 PRIMARY HYPERTENSION: Primary | ICD-10-CM

## 2023-06-15 DIAGNOSIS — E78.5 DYSLIPIDEMIA: ICD-10-CM

## 2023-06-15 PROCEDURE — 99203 OFFICE O/P NEW LOW 30 MIN: CPT | Performed by: INTERNAL MEDICINE

## 2023-06-15 NOTE — PROGRESS NOTES
[facility]  [address] [place] [address] [address]   [place], [address]    Cardiology Consultation    Nimo Guzmán  [id number]  1980      Consult for: Hypertension and dyslipidemia  Appreciate consult by: NETTA Pillai      Discussion/Summary:     1  Primary hypertension  POCT ECG      2  Dyslipidemia  POCT ECG         The 10-year ASCVD risk score (Arlene LIN, et al , 2019) is: 5%    Values used to calculate the score:      Age: 36 years      Sex: Male      Is Non- : No      Diabetic: No      Tobacco smoker: No      Systolic Blood Pressure: 494 mmHg      Is BP treated: Yes      HDL Cholesterol: 30 mg/dL      Total Cholesterol: 238 mg/dL     Discussed cardiac risk with patient in detail  He has lost a large amount of weight and blood pressure improved  His last cholesterol levels were elevated  Discussed importance of regular exercise, eating well and maintaining control of blood pressure and cholesterol  He will reconsider going back on cholesterol medications  We will repeat blood work in September  HPI:     Nimo Guzmán is a 36 y o  here for evaluation of hypertension and dyslipidemia  He was following with a cardiologist in [place] but is switching to be closer to home  He no complaints at this time  He denies any chest pain, shortness of breath, lower extremity edema, orthopnea or paroxysmal nocturnal dyspnea  He has a history of hypertension and dyslipidemia  He stopped taking his medications after losing a large amount of weight  His last blood work in April 2023 showed a total cholesterol of 238, triglycerides 267 and LDL of 155  Previously, he was using fenofibrate and rosuvastatin 10 mg daily  For blood pressure, he was on lisinopril 10 mg and amlodipine 10 mg daily      Past Medical History:   Diagnosis Date   • Chest pain    • High blood pressure    • High cholesterol      Social History     Tobacco Use   • "Smoking status: Never   • Smokeless tobacco: Never   Vaping Use   • Vaping Use: Never used   Substance Use Topics   • Alcohol use: Never   • Drug use: Never      Family History   Problem Relation Age of Onset   • No Known Problems Mother    • No Known Problems Father      History reviewed  No pertinent surgical history  Current Outpatient Medications:   •  amLODIPine (NORVASC) 10 mg tablet, Take 1 tablet by mouth daily, Disp: , Rfl:   •  carBAMazepine (TEGretol XR) 200 mg 12 hr tablet, Take 1 tablet (200 mg total) by mouth 2 (two) times a day, Disp: 30 tablet, Rfl: 2  •  ergocalciferol (VITAMIN D2) 50,000 units, take 1 capsule by mouth every week with food, Disp: , Rfl:   •  fenofibrate (TRICOR) 145 mg tablet, Take 1 tablet by mouth daily, Disp: , Rfl:   •  fluticasone (FLONASE) 50 mcg/act nasal spray, 1 spray into each nostril 2 (two) times a day, Disp: 1 g, Rfl: 6  •  lisinopril (ZESTRIL) 10 mg tablet, Take 1 tablet by mouth daily, Disp: , Rfl:   •  naproxen (Naprosyn) 500 mg tablet, Take 1 tablet (500 mg total) by mouth 2 (two) times a day with meals (Patient not taking: Reported on 5/25/2023), Disp: 30 tablet, Rfl: 0  •  rosuvastatin (CRESTOR) 10 MG tablet, Take 10 mg by mouth daily (Patient not taking: Reported on 6/15/2023), Disp: , Rfl:   Allergies   Allergen Reactions   • Penicillins Hives     Reaction Date: 11Feb2005;      • Shellfish-Derived Products - Food Allergy Itching       Review of Systems:   Review of Systems   Cardiovascular: Negative for chest pain, palpitations and leg swelling  All other systems reviewed and are negative  Physical Examination:     Vitals:    06/15/23 1248   BP: 128/88   BP Location: Left arm   Patient Position: Sitting   Cuff Size: Large   Pulse: 76   SpO2: 97%   Weight: 90 3 kg (199 lb)   Height: 5' 8\" (1 727 m)       Physical Exam   Constitutional: He appears healthy  No distress  Eyes: Pupils are equal, round, and reactive to light   Conjunctivae are normal  " "  Neck: No JVD present  Cardiovascular: Normal rate, regular rhythm and normal heart sounds  Exam reveals no gallop and no friction rub  No murmur heard  Pulmonary/Chest: Effort normal and breath sounds normal  He has no wheezes  He has no rales  Musculoskeletal:         General: No tenderness, deformity or edema  Cervical back: Normal range of motion and neck supple  Neurological: He is alert and oriented to person, place, and time  Skin: Skin is warm and dry  Labs:     Lab Results   Component Value Date    HCT 49 9 (H) 04/21/2023    HGB 16 6 04/21/2023    MCV 88 04/21/2023     04/21/2023    RDW 12 7 04/21/2023    WBC 12 37 (H) 04/21/2023     BMP:  Lab Results   Component Value Date    BUN 15 04/21/2023    CALCIUM 9 4 04/21/2023     04/21/2023    CO2 24 04/21/2023    CREATININE 1 05 04/21/2023    EGFR 86 04/21/2023    GLUC 108 04/21/2023    K 3 9 04/21/2023    MG 2 1 04/03/2023    SODIUM 135 04/21/2023     LFT:  Lab Results   Component Value Date    ALB 4 4 04/21/2023    ALKPHOS 95 04/21/2023    ALT 52 04/21/2023    AST 23 04/21/2023    TP 8 3 04/21/2023      No results found for: \"[id number]\"  Lab Results   Component Value Date    HGBA1C 5 5 04/21/2023     Lipid Profile:   Lab Results   Component Value Date    CHOLESTEROL 238 (H) 04/21/2023    HDL 30 (L) 04/21/2023    LDLCALC 155 (H) 04/21/2023    TRIG 267 (H) 04/21/2023     Lab Results   Component Value Date    CHOLESTEROL 238 (H) 04/21/2023     Lab Results   Component Value Date    CKMB 1 1 04/13/2021    CKMBINDEX <1 0 04/13/2021    CKTOTAL 232 04/21/2023     No results found for: \"NTBNP\"   No results found for this or any previous visit (from the past 672 hour(s))  Imaging & Testing   I have personally reviewed pertinent reports  Cardiac Testing   No results found for this or any previous visit  EKG: Personally reviewed      Normal sinus rhythm with T wave inversions laterally      Navtej Roshni, DO, PJ  [facility] " [place]  [phone]  Please call with any questions

## 2023-06-16 ENCOUNTER — HOSPITAL ENCOUNTER (OUTPATIENT)
Dept: RADIOLOGY | Facility: HOSPITAL | Age: 43
Discharge: HOME | End: 2023-06-16
Attending: OTOLARYNGOLOGY
Payer: MEDICARE

## 2023-06-16 DIAGNOSIS — G90.09: ICD-10-CM

## 2023-06-16 DIAGNOSIS — G52.1 GLOSSOPHARYNGEAL NEURALGIA: ICD-10-CM

## 2023-06-16 PROCEDURE — 70553 MRI BRAIN STEM W/O & W/DYE: CPT

## 2023-06-16 PROCEDURE — 93000 ELECTROCARDIOGRAM COMPLETE: CPT | Performed by: INTERNAL MEDICINE

## 2023-06-16 PROCEDURE — G1004 CDSM NDSC: HCPCS

## 2023-06-16 PROCEDURE — A9585 GADOBUTROL INJECTION: HCPCS | Performed by: OTOLARYNGOLOGY

## 2023-06-16 RX ADMIN — GADOBUTROL 9 ML: 604.72 INJECTION INTRAVENOUS at 16:58

## 2023-06-29 ENCOUNTER — OFFICE VISIT (OUTPATIENT)
Dept: OTOLARYNGOLOGY | Facility: CLINIC | Age: 43
End: 2023-06-29
Payer: MEDICARE

## 2023-06-29 VITALS — WEIGHT: 199 LBS | TEMPERATURE: 97.2 F | HEIGHT: 68 IN | BODY MASS INDEX: 30.16 KG/M2

## 2023-06-29 DIAGNOSIS — G52.1 GLOSSOPHARYNGEAL NEURALGIA: ICD-10-CM

## 2023-06-29 DIAGNOSIS — G89.29 CHRONIC THROAT PAIN: ICD-10-CM

## 2023-06-29 DIAGNOSIS — R07.0 CHRONIC THROAT PAIN: ICD-10-CM

## 2023-06-29 DIAGNOSIS — H92.02 OTALGIA OF LEFT EAR: Primary | ICD-10-CM

## 2023-06-29 PROCEDURE — 99213 OFFICE O/P EST LOW 20 MIN: CPT | Performed by: OTOLARYNGOLOGY

## 2023-06-29 NOTE — PROGRESS NOTES
"Assessment/Plan:  No improvement with carbamazepine so far  The MRI finding of a vein contacting the trigeminal nerve may play a role  I referred the pt to Neurosurgery, but they reviewed the chart and referred him to Neurology (scheduled for next month)  The patient has asked about simply removing his tonsils, since the epicenter of his pain seems to be his left tonsil  D/c carbamazepine for now, since it did not seem to help  F/u with Neurologist as scheduled  Consider tonsillectomy if no other options come to light  Diagnosis ICD-10-CM Associated Orders   1  Otalgia of left ear  H92 02       2  Chronic throat pain  R07 0     G89 29       3  Glossopharyngeal neuralgia  G52 1              Subjective:      Patient ID: Eileen Diaz is a 36 y o  male  Pt does not notice any improvement on carbamazepine  He still has the severe pain in the left side of his throat  His MRI showed a small vein that contacts the lateral surface of the left trigeminal nerve cisternal segment  The following portions of the patient's history were reviewed and updated as appropriate: allergies, current medications, past family history, past medical history, past social history, past surgical history and problem list     Review of Systems      Objective:      Temp (!) 97 2 °F (36 2 °C) (Temporal)   Ht 5' 8\" (1 727 m)   Wt 90 3 kg (199 lb)   BMI 30 26 kg/m²          Physical Exam  Constitutional:       Appearance: He is well-developed  HENT:      Head: Normocephalic and atraumatic  Right Ear: Tympanic membrane, ear canal and external ear normal  No drainage  No middle ear effusion  Left Ear: Tympanic membrane, ear canal and external ear normal  No drainage  No middle ear effusion  Nose: Nose normal       Mouth/Throat:      Pharynx: Uvula midline  No oropharyngeal exudate  Tonsils: 2+ on the right  2+ on the left  Neck:      Thyroid: No thyroid mass or thyromegaly        Trachea: Trachea " normal  No tracheal deviation  Lymphadenopathy:      Cervical: No cervical adenopathy  Neurological:      Mental Status: He is alert

## 2023-07-03 DIAGNOSIS — G52.1 GLOSSOPHARYNGEAL NEURALGIA: ICD-10-CM

## 2023-07-05 RX ORDER — CARBAMAZEPINE 200 MG/1
TABLET, EXTENDED RELEASE ORAL
Qty: 30 TABLET | Refills: 2 | Status: SHIPPED | OUTPATIENT
Start: 2023-07-05

## 2023-07-06 ENCOUNTER — OFFICE VISIT (OUTPATIENT)
Dept: RHEUMATOLOGY | Facility: CLINIC | Age: 43
End: 2023-07-06
Payer: MEDICARE

## 2023-07-06 ENCOUNTER — LAB (OUTPATIENT)
Dept: LAB | Facility: CLINIC | Age: 43
End: 2023-07-06
Payer: MEDICARE

## 2023-07-06 VITALS
HEART RATE: 90 BPM | DIASTOLIC BLOOD PRESSURE: 95 MMHG | SYSTOLIC BLOOD PRESSURE: 135 MMHG | WEIGHT: 199 LBS | BODY MASS INDEX: 30.26 KG/M2

## 2023-07-06 DIAGNOSIS — M25.50 POLYARTHRALGIA: ICD-10-CM

## 2023-07-06 DIAGNOSIS — M79.10 MYALGIA: ICD-10-CM

## 2023-07-06 DIAGNOSIS — D58.2 ELEVATED HEMOGLOBIN (HCC): ICD-10-CM

## 2023-07-06 DIAGNOSIS — R23.8 SKIN SENSITIVITY: ICD-10-CM

## 2023-07-06 DIAGNOSIS — R79.89 HIGH SERUM FERRITIN: Primary | ICD-10-CM

## 2023-07-06 DIAGNOSIS — M10.9 GOUT, UNSPECIFIED CAUSE, UNSPECIFIED CHRONICITY, UNSPECIFIED SITE: ICD-10-CM

## 2023-07-06 DIAGNOSIS — M25.50 POLYARTHRALGIA: Primary | ICD-10-CM

## 2023-07-06 LAB
ERYTHROCYTE [SEDIMENTATION RATE] IN BLOOD: 21 MM/HOUR (ref 0–14)
FERRITIN SERPL-MCNC: 939 NG/ML (ref 24–336)

## 2023-07-06 PROCEDURE — 36415 COLL VENOUS BLD VENIPUNCTURE: CPT

## 2023-07-06 PROCEDURE — 85652 RBC SED RATE AUTOMATED: CPT

## 2023-07-06 PROCEDURE — 99205 OFFICE O/P NEW HI 60 MIN: CPT | Performed by: INTERNAL MEDICINE

## 2023-07-06 PROCEDURE — 86200 CCP ANTIBODY: CPT

## 2023-07-06 PROCEDURE — 86235 NUCLEAR ANTIGEN ANTIBODY: CPT

## 2023-07-06 PROCEDURE — 82728 ASSAY OF FERRITIN: CPT

## 2023-07-06 RX ORDER — DICLOFENAC SODIUM 75 MG/1
75 TABLET, DELAYED RELEASE ORAL 2 TIMES DAILY PRN
Qty: 20 TABLET | Refills: 0 | Status: SHIPPED | OUTPATIENT
Start: 2023-07-06

## 2023-07-06 NOTE — PROGRESS NOTES
Assessment and Plan:   Mr. Jean Paul Ch is a 44-year-old male with no significant past medical history who presents for an evaluation of widespread body pain. He is referred by NETTA Spencer for a rheumatology consult. Jeremy Felton presents today for an evaluation of widespread body pain/myalgias that has been occurring over the past year. He does not report symptoms concerning for an inflammatory arthritis/myopathy and the serologies done thus far have been unremarkable. I suspect the etiology of his pain is noninflammatory in nature and he can follow-up with his primary care provider for management. In the meanwhile I provided him with a prescription for diclofenac 75 mg twice a day as needed which can be continued through his PCP if it is effective for his symptoms. Plan:  Diagnoses and all orders for this visit:    Polyarthralgia  -     Ambulatory Referral to Rheumatology  -     Sjogren's Antibodies; Future  -     Sedimentation rate, automated; Future  -     Cyclic citrul peptide antibody, IgG; Future  -     Ferritin; Future  -     HLA-B27 antigen; Future  -     diclofenac (VOLTAREN) 75 mg EC tablet; Take 1 tablet (75 mg total) by mouth 2 (two) times a day as needed (Pain)    Myalgia  -     Ambulatory Referral to Rheumatology  -     Sjogren's Antibodies; Future  -     Sedimentation rate, automated; Future  -     Cyclic citrul peptide antibody, IgG; Future  -     Ferritin; Future  -     HLA-B27 antigen; Future  -     diclofenac (VOLTAREN) 75 mg EC tablet; Take 1 tablet (75 mg total) by mouth 2 (two) times a day as needed (Pain)    Skin sensitivity  -     Ambulatory Referral to Rheumatology    Gout, unspecified cause, unspecified chronicity, unspecified site  Comments:  For HLA  Orders:  -     HLA-B27 antigen; Future      I have personally reviewed pertinent films in PACS of the CT soft tissue neck which is unremarkable. Activities as tolerated.    Exercise: try to maintain a low impact exercise regimen as much as possible. Walk for 30 minutes a day for at least 3 days a week. Continue other medications as prescribed by PCP and other specialists. RTC PRN. HPI  Mr. Harris Hoover is a 58-year-old male with no significant past medical history who presents for an evaluation of widespread body pain. He is referred by NETTA Morris for a rheumatology consult. Patient reports over the past year he has been experiencing pain from head to toe mostly affecting his muscle regions and deep in his bones. He reports his symptoms have remained the same since its onset and is present on a daily basis fairly constantly. He does obtain relief with resting and states anytime he is active the pain flares up and also with pressure/touch. He is not describing specific joint pains. No swollen joints. No specific morning stiffness of his joints. He has tried naproxen and Aleve without benefit. He denies fevers, unintentional weight loss, dry eyes, dry mouth, inflammatory eye disease, skin rash, psoriasis, inflammatory bowel disease or a family history of autoimmune disease. He was evaluated by his primary care physician and had testing done which showed a normal IMELDA screen, C-reactive protein, rheumatoid factor, TSH, vitamin D, vitamin B12, Lyme antibody profile, HIV, CK, hepatitis C antibody, CBC and CMP. The following portions of the patient's history were reviewed and updated as appropriate: allergies, current medications, past family history, past medical history, past social history, past surgical history and problem list.      Review of Systems  Constitutional: Negative for weight change, fevers, chills, night sweats, fatigue. ENT/Mouth: Negative for hearing changes, ear pain, nasal congestion, sinus pain, hoarseness, sore throat, rhinorrhea, swallowing difficulty. Eyes: Negative for pain, redness, discharge, vision changes. Cardiovascular: Negative for chest pain, SOB, palpitations. Respiratory: Negative for cough, sputum, wheezing, dyspnea. Gastrointestinal: Negative for nausea, vomiting, diarrhea, constipation, pain, heartburn. Genitourinary: Negative for dysuria, urinary frequency, hematuria. Musculoskeletal: As per HPI. Skin: Negative for skin rash, color changes. Neuro: Negative for weakness, numbness, tingling, loss of consciousness. Psych: Negative for anxiety, depression. Heme/Lymph: Negative for easy bruising, bleeding, lymphadenopathy. Past Medical History:   Diagnosis Date   • Chest pain    • High blood pressure    • High cholesterol        History reviewed. No pertinent surgical history. Social History     Socioeconomic History   • Marital status: Single     Spouse name: Not on file   • Number of children: Not on file   • Years of education: Not on file   • Highest education level: Not on file   Occupational History   • Not on file   Tobacco Use   • Smoking status: Never   • Smokeless tobacco: Never   Vaping Use   • Vaping Use: Never used   Substance and Sexual Activity   • Alcohol use: Never   • Drug use: Never   • Sexual activity: Not Currently   Other Topics Concern   • Not on file   Social History Narrative   • Not on file     Social Determinants of Health     Financial Resource Strain: Low Risk  (4/21/2023)    Overall Financial Resource Strain (CARDIA)    • Difficulty of Paying Living Expenses: Not hard at all   Food Insecurity: Not on file   Transportation Needs: No Transportation Needs (4/21/2023)    PRAPARE - Transportation    • Lack of Transportation (Medical): No    • Lack of Transportation (Non-Medical):  No   Physical Activity: Not on file   Stress: Not on file   Social Connections: Not on file   Intimate Partner Violence: Not on file   Housing Stability: Not on file       Family History   Problem Relation Age of Onset   • No Known Problems Mother    • No Known Problems Father        Allergies   Allergen Reactions   • Penicillins Hives Reaction Date: 11Feb2005;      • Shellfish-Derived Products - Food Allergy Itching       Current Outpatient Medications:   •  diclofenac (VOLTAREN) 75 mg EC tablet, Take 1 tablet (75 mg total) by mouth 2 (two) times a day as needed (Pain), Disp: 20 tablet, Rfl: 0  •  amLODIPine (NORVASC) 10 mg tablet, Take 1 tablet by mouth daily, Disp: , Rfl:   •  carBAMazepine (TEGretol XR) 200 mg 12 hr tablet, take 1 tablet by mouth twice a day, Disp: 30 tablet, Rfl: 2  •  ergocalciferol (VITAMIN D2) 50,000 units, take 1 capsule by mouth every week with food, Disp: , Rfl:   •  fenofibrate (TRICOR) 145 mg tablet, Take 1 tablet by mouth daily, Disp: , Rfl:   •  fluticasone (FLONASE) 50 mcg/act nasal spray, 1 spray into each nostril 2 (two) times a day, Disp: 1 g, Rfl: 6  •  lisinopril (ZESTRIL) 10 mg tablet, Take 1 tablet by mouth daily, Disp: , Rfl:   •  naproxen (Naprosyn) 500 mg tablet, Take 1 tablet (500 mg total) by mouth 2 (two) times a day with meals (Patient not taking: Reported on 5/25/2023), Disp: 30 tablet, Rfl: 0  •  rosuvastatin (CRESTOR) 10 MG tablet, Take 10 mg by mouth daily (Patient not taking: Reported on 6/15/2023), Disp: , Rfl:       Objective:    Vitals:    07/06/23 1049   BP: 135/95   Pulse: 90   Weight: 90.3 kg (199 lb)       Physical Exam  General: Well appearing, well nourished, in no distress. Oriented x 3, normal mood and affect. Ambulating without difficulty. Skin: Good turgor, no rash, unusual bruising or prominent lesions. Hair: Normal texture and distribution. Nails: Normal color, no deformities. HEENT:  Head: Normocephalic, atraumatic. Eyes: Conjunctiva clear, sclera non-icteric, EOM intact. Extremities: No amputations or deformities, cyanosis, edema. Musculoskeletal: There is no peripheral joint soft tissue swelling or tenderness noted today. He does have 4/18 positive fibromyalgia tender points. Neurologic: Alert and oriented. No focal neurological deficits appreciated.    Psychiatric: Normal mood and affect. Gordy Arellano M.D.   Rheumatology

## 2023-07-07 LAB
CCP AB SER IA-ACNC: 1.3
ENA SS-A AB SER-ACNC: <0.2 AI (ref 0–0.9)
ENA SS-B AB SER-ACNC: <0.2 AI (ref 0–0.9)

## 2023-07-13 LAB — HLA-B27 QL NAA+PROBE: NORMAL

## 2023-07-17 ENCOUNTER — TELEPHONE (OUTPATIENT)
Dept: RHEUMATOLOGY | Facility: CLINIC | Age: 43
End: 2023-07-17

## 2023-07-17 NOTE — TELEPHONE ENCOUNTER
----- Message from Emmett Pugh MD sent at 7/15/2023 10:01 AM EDT -----  Please let him know the additional autoimmune arthritis testing is normal. He does have high hemoglobin and increased iron stores and to determine if this is significant I recommend he see a hematologist. I will place an order in his chart.

## 2023-07-17 NOTE — TELEPHONE ENCOUNTER
Caller: mom    Doctor: Jason Paulson    Reason for call: mom called back asking what the message was.  Reread the note and mom confirmed    Call back#: n/a

## 2023-07-20 ENCOUNTER — RA CDI HCC (OUTPATIENT)
Dept: OTHER | Facility: HOSPITAL | Age: 43
End: 2023-07-20

## 2023-07-20 NOTE — PROGRESS NOTES
[address] [facility] coding opportunities       Chart reviewed, no opportunity found: CHART REVIEWED, NO OPPORTUNITY FOUND        Patients Insurance     Medicare Insurance: Medicare

## 2023-07-25 ENCOUNTER — TELEPHONE (OUTPATIENT)
Dept: HEMATOLOGY ONCOLOGY | Facility: CLINIC | Age: 43
End: 2023-07-25

## 2023-07-25 ENCOUNTER — CONSULT (OUTPATIENT)
Dept: NEUROLOGY | Facility: CLINIC | Age: 43
End: 2023-07-25
Payer: MEDICARE

## 2023-07-25 VITALS
BODY MASS INDEX: 30.16 KG/M2 | OXYGEN SATURATION: 97 % | TEMPERATURE: 97.7 F | DIASTOLIC BLOOD PRESSURE: 92 MMHG | HEIGHT: 68 IN | WEIGHT: 199 LBS | HEART RATE: 89 BPM | SYSTOLIC BLOOD PRESSURE: 140 MMHG

## 2023-07-25 DIAGNOSIS — R79.89 ELEVATED FERRITIN LEVEL: Primary | ICD-10-CM

## 2023-07-25 DIAGNOSIS — R51.9 CHRONIC NONINTRACTABLE HEADACHE, UNSPECIFIED HEADACHE TYPE: ICD-10-CM

## 2023-07-25 DIAGNOSIS — G89.29 CHRONIC NONINTRACTABLE HEADACHE, UNSPECIFIED HEADACHE TYPE: ICD-10-CM

## 2023-07-25 DIAGNOSIS — M51.9 LUMBAR DISC DISEASE: ICD-10-CM

## 2023-07-25 DIAGNOSIS — G52.1 GLOSSOPHARYNGEAL NEURALGIA: ICD-10-CM

## 2023-07-25 PROCEDURE — 99205 OFFICE O/P NEW HI 60 MIN: CPT | Performed by: PSYCHIATRY & NEUROLOGY

## 2023-07-25 NOTE — TELEPHONE ENCOUNTER
Patients mother calling to ask if Dr. Abdi Marks had any open appointments. I informed her Dr. Abdi Marks is booked until November at the [facility].

## 2023-07-25 NOTE — LETTER
July 25, 2023     Savanah Ortiz, [address] [address]    Patient: Trenton Bernal   YOB: 1980   Date of Visit: 7/25/2023       Dear Dr. Mer Chu: Thank you for referring Trenton Bernal to me for evaluation. Below are my notes for this consultation. If you have questions, please do not hesitate to call me. I look forward to following your patient along with you. Sincerely,        Carola Chris MD        CC: No Recipients    Carola Chris MD  7/25/2023  2:44 PM  Incomplete  Outpatient Neurology History and Physical  Trenton Bernal  [id number]  61 y.o.  1980          Consult: Yes    NETTA Garduno      Chief Complaint   Patient presents with   • Chronic nonintractable headache, unspecified headache type           History Obtained from: patient and mother     HPI:       Trenton Bernal is a 35 yo M with PMH of Lyme presents to discuss multiple symptoms and abnormal imaging. For past 6 months, patient reports cluster of symptoms including headaches, body aches, pain, sob, flutter in heart. He had comprehensive rheumatological work up and it was negative except for very high ferritin levels and high H/H. He's awaiting appt with heme/onc. Patient has seen ENT for stabbing left side of head and throat pain. He describes this as inner tonsil pain. He was dx with glossopharyngeal neuralgia. MRI brain was ordered which revealed small vein contacts superomedial aspect of right trigeminal nerve cisternal segment. Small vein contacts lateral surface of left trigeminal nerve cisternal segment. No pathology was found for glossopharyngeal nerve or pterygopalatine fossa. He was sent to neurosurgeon but they said to first see neurology. ENT had tried tegretol for him and he took it for 4 weeks and he found no benefit from it. Patient also reports daily, constant neck and lower back pain. Denies radicular pain.    He has severe DD in 2019 and was asked to consider surgery then. We don't have his prior MRI available for review. He's had MRI brain seizure protocol in 2019 and it was normal. He says he's had seizures in past but it's not clear whether they were epileptic. Past Medical History:   Diagnosis Date   • Chest pain    • High blood pressure    • High cholesterol    • Increased storage iron    • Migraine                Current Outpatient Medications on File Prior to Visit   Medication Sig Dispense Refill   • amLODIPine (NORVASC) 10 mg tablet Take 1 tablet by mouth daily     • ergocalciferol (VITAMIN D2) 50,000 units take 1 capsule by mouth every week with food     • fenofibrate (TRICOR) 145 mg tablet Take 1 tablet by mouth daily     • lisinopril (ZESTRIL) 10 mg tablet Take 1 tablet by mouth daily     • carBAMazepine (TEGretol XR) 200 mg 12 hr tablet take 1 tablet by mouth twice a day (Patient not taking: Reported on 7/25/2023) 30 tablet 2   • diclofenac (VOLTAREN) 75 mg EC tablet Take 1 tablet (75 mg total) by mouth 2 (two) times a day as needed (Pain) (Patient not taking: Reported on 7/25/2023) 20 tablet 0   • fluticasone (FLONASE) 50 mcg/act nasal spray 1 spray into each nostril 2 (two) times a day (Patient not taking: Reported on 7/25/2023) 1 g 6   • naproxen (Naprosyn) 500 mg tablet Take 1 tablet (500 mg total) by mouth 2 (two) times a day with meals (Patient not taking: Reported on 5/25/2023) 30 tablet 0   • rosuvastatin (CRESTOR) 10 MG tablet Take 10 mg by mouth daily (Patient not taking: Reported on 6/15/2023)       No current facility-administered medications on file prior to visit. Allergies   Allergen Reactions   • Penicillins Hives     Reaction Date: 11Feb2005;      • Shellfish-Derived Products - Food Allergy Itching         Family History   Problem Relation Age of Onset   • No Known Problems Mother    • No Known Problems Father                 History reviewed. No pertinent surgical history.         Social History Socioeconomic History   • Marital status: Single     Spouse name: Not on file   • Number of children: Not on file   • Years of education: Not on file   • Highest education level: Not on file   Occupational History   • Not on file   Tobacco Use   • Smoking status: Never   • Smokeless tobacco: Never   Vaping Use   • Vaping Use: Never used   Substance and Sexual Activity   • Alcohol use: Never   • Drug use: Never   • Sexual activity: Not Currently   Other Topics Concern   • Not on file   Social History Narrative   • Not on file     Social Determinants of Health     Financial Resource Strain: Low Risk  (4/21/2023)    Overall Financial Resource Strain (CARDIA)    • Difficulty of Paying Living Expenses: Not hard at all   Food Insecurity: Not on file   Transportation Needs: No Transportation Needs (4/21/2023)    PRAPARE - Transportation    • Lack of Transportation (Medical): No    • Lack of Transportation (Non-Medical): No   Physical Activity: Not on file   Stress: Not on file   Social Connections: Not on file   Intimate Partner Violence: Not on file   Housing Stability: Not on file       Review of Systems  Refer to positive review of systems in HPI  Constitutional- No fever  Eyes- No visual change  ENT- Hearing normal  CV- No chest pain  Resp- No Shortness of breath  GI- No diarrhea  - Bladder normal  MS- No Arthritis   Skin- No rash  Psych- No depression  Endo- No DM  Heme- No nodes    PHYSICAL EXAM:    Vitals:    07/25/23 1343   BP: 140/92   BP Location: Left arm   Patient Position: Sitting   Cuff Size: Standard   Pulse: 89   Temp: 97.7 °F (36.5 °C)   TempSrc: Tympanic   SpO2: 97%   Weight: 90.3 kg (199 lb)   Height: 5' 8" (1.727 m)         Appearance: No Acute Distress  Ophthalmoscopic: Disc Flat, Normal fundus  Carotid/Heart/Peripheral Vascular: No Bruits, RRR  Orientation: Awake, Alert, and Oriented x 3  Mental status:  Memory: Registation 3/3 Recall 3/3  Attention: Normal  Knowledge: Appropriate  Language:  No aphasia  Speech: No dysarthria  Cranial Nerves:  2 No Visual Defect on Confrontation; Pupils round, equal, reactive to light  3,4,6 Extraocular Movements Intact; no nystagmus  5 Facial Sensation Intact  7 No facial asymmetry  8 Intact hearing  9,10 Palate symmetric, normal gag  11 Good shoulder shrug  12 Tongue Midline  Gait: Stable, No ataxia, can perform tandem walking  Coordination: No ataxia with finger to nose testing and heel to shin testing  Sensory: Intact, Symmetric to Pinprick, Light Touch, Vibration, and Joint Position  Muscle Tone: Normal  Muscle exam  Arm Right Left Leg Right Left   Deltoid 5/5 5/5 Iliopsoas 5/5 5/5   Biceps 5/5 5/5 Quads 5/5 5/5   Triceps 5/5 5/5 Hamstrings 5/5 5/5   Wrist Extension 5/5 5/5 Ankle Dorsi Flexion 5/5 5/5   Wrist Flexion 5/5 5/5 Ankle Plantar Flexion 5/5 5/5   Interossei 5/5 5/5 Ankle Eversion 5/5 5/5   APB 5/5 5/5 Ankle Inversion 5/5 5/5       Reflexes   RJ BJ TJ KJ AJ Plantars Hay's   Right 2+ 2+ 2+ 2+ 2+ Downgoing Not present   Left 2+ 2+ 2+ 2+ 2+ Downgoing Not present         Personal review of          Mri brain, mri  MRI cervical spine, mri lumbar spine:  C5-6 mild canal severe left and mild to moderate right foraminal stenosis. Assessment/Plan:     1. Elevated ferritin level        2. Chronic nonintractable headache, unspecified headache type  Ambulatory referral to Neurology      3. Glossopharyngeal neuralgia  Ambulatory referral to Neurology      4. Lumbar disc disease  MRI lumbar spine without contrast            Patient's MRI brain finding of vein contacting b/l trigeminal nerve however patient doesn't have correlating symptoms. His symptoms do fit more for glossopharyngeal nerve or tonsils. I do believe his elevated ferritin level is contributing to some of his symptoms. I requested Dr. Cordell Steiner to see if they can see patient sooner. For his lower back pain, will obtain imaging to decide future management. He was not able to do PT in past for this. Counseling Documentation:  The patient and/or patient's family were  counseled regarding diagnostic results. Instructions for management,risk factor reductions,prognosis of disease were discussed. Patient and family were educated regarding impressions,risks and benefits of treatment options,importance of compliance with treatment. Total time of encounter: 60 min   More than 50% of time was spent in counseling and coordination of care of patient. Radha Kelly M.D.   [facility]  [address] [place] [address]

## 2023-07-25 NOTE — LETTER
July 25, 2023     Hudson Sandoval, [address]    Patient: Elkin Ge   YOB: 1980   Date of Visit: 7/25/2023       Dear Dr. Marilu Guzman:    Thank you for referring Elkin Ge to me for evaluation. Below are my notes for this consultation. If you have questions, please do not hesitate to call me. I look forward to following your patient along with you. Sincerely,        Sebastian Kong MD        CC: No Recipients    Sebastian Kong MD  7/25/2023  6:08 PM  Sign when Signing Visit  Outpatient Neurology History and Physical  Elkin Ge  [id number]  89 y.o.  1980          Consult: Yes    NETTA Chew      Chief Complaint   Patient presents with   • Chronic nonintractable headache, unspecified headache type           History Obtained from: patient and mother     HPI:       Elkin Ge is a 37 yo M with PMH of Lyme presents to discuss multiple symptoms and abnormal imaging. For past 6 months, patient reports cluster of symptoms including headaches, body aches, pain, sob, flutter in heart. He had comprehensive rheumatological work up and it was negative except for very high ferritin levels and high H/H. He's awaiting appt with heme/onc. Patient has seen ENT for stabbing left side of head and throat pain. He describes this as inner tonsil pain. He was dx with glossopharyngeal neuralgia. MRI brain was ordered which revealed small vein contacts superomedial aspect of right trigeminal nerve cisternal segment. Small vein contacts lateral surface of left trigeminal nerve cisternal segment. No pathology was found for glossopharyngeal nerve or pterygopalatine fossa. He was sent to neurosurgeon but they said to first see neurology. ENT had tried tegretol for him and he took it for 4 weeks and he found no benefit from it. Patient also reports daily, constant neck and lower back pain. Denies radicular pain.    He has severe DD in 2019 and was asked to consider surgery then. We don't have his prior MRI available for review. He's had MRI brain seizure protocol in 2019 and it was normal. He says he's had seizures in past but it's not clear whether they were epileptic. Past Medical History:   Diagnosis Date   • Chest pain    • High blood pressure    • High cholesterol    • Increased storage iron    • Migraine                Current Outpatient Medications on File Prior to Visit   Medication Sig Dispense Refill   • amLODIPine (NORVASC) 10 mg tablet Take 1 tablet by mouth daily     • ergocalciferol (VITAMIN D2) 50,000 units take 1 capsule by mouth every week with food     • fenofibrate (TRICOR) 145 mg tablet Take 1 tablet by mouth daily     • lisinopril (ZESTRIL) 10 mg tablet Take 1 tablet by mouth daily     • carBAMazepine (TEGretol XR) 200 mg 12 hr tablet take 1 tablet by mouth twice a day (Patient not taking: Reported on 7/25/2023) 30 tablet 2   • diclofenac (VOLTAREN) 75 mg EC tablet Take 1 tablet (75 mg total) by mouth 2 (two) times a day as needed (Pain) (Patient not taking: Reported on 7/25/2023) 20 tablet 0   • fluticasone (FLONASE) 50 mcg/act nasal spray 1 spray into each nostril 2 (two) times a day (Patient not taking: Reported on 7/25/2023) 1 g 6   • naproxen (Naprosyn) 500 mg tablet Take 1 tablet (500 mg total) by mouth 2 (two) times a day with meals (Patient not taking: Reported on 5/25/2023) 30 tablet 0   • rosuvastatin (CRESTOR) 10 MG tablet Take 10 mg by mouth daily (Patient not taking: Reported on 6/15/2023)       No current facility-administered medications on file prior to visit. Allergies   Allergen Reactions   • Penicillins Hives     Reaction Date: 11Feb2005;      • Shellfish-Derived Products - Food Allergy Itching         Family History   Problem Relation Age of Onset   • No Known Problems Mother    • No Known Problems Father                 History reviewed. No pertinent surgical history.         Social History Socioeconomic History   • Marital status: Single     Spouse name: Not on file   • Number of children: Not on file   • Years of education: Not on file   • Highest education level: Not on file   Occupational History   • Not on file   Tobacco Use   • Smoking status: Never   • Smokeless tobacco: Never   Vaping Use   • Vaping Use: Never used   Substance and Sexual Activity   • Alcohol use: Never   • Drug use: Never   • Sexual activity: Not Currently   Other Topics Concern   • Not on file   Social History Narrative   • Not on file     Social Determinants of Health     Financial Resource Strain: Low Risk  (4/21/2023)    Overall Financial Resource Strain (CARDIA)    • Difficulty of Paying Living Expenses: Not hard at all   Food Insecurity: Not on file   Transportation Needs: No Transportation Needs (4/21/2023)    PRAPARE - Transportation    • Lack of Transportation (Medical): No    • Lack of Transportation (Non-Medical): No   Physical Activity: Not on file   Stress: Not on file   Social Connections: Not on file   Intimate Partner Violence: Not on file   Housing Stability: Not on file       Review of Systems  Refer to positive review of systems in HPI  Constitutional- No fever  Eyes- No visual change  ENT- Hearing normal  CV- No chest pain  Resp- No Shortness of breath  GI- No diarrhea  - Bladder normal  MS- No Arthritis   Skin- No rash  Psych- No depression  Endo- No DM  Heme- No nodes    PHYSICAL EXAM:    Vitals:    07/25/23 1343   BP: 140/92   BP Location: Left arm   Patient Position: Sitting   Cuff Size: Standard   Pulse: 89   Temp: 97.7 °F (36.5 °C)   TempSrc: Tympanic   SpO2: 97%   Weight: 90.3 kg (199 lb)   Height: 5' 8" (1.727 m)         Appearance: No Acute Distress  Ophthalmoscopic: Disc Flat, Normal fundus  Carotid/Heart/Peripheral Vascular: No Bruits, RRR  Orientation: Awake, Alert, and Oriented x 3  Mental status:  Memory: Registation 3/3 Recall 3/3  Attention: Normal  Knowledge: Appropriate  Language:  No aphasia  Speech: No dysarthria  Cranial Nerves:  2 No Visual Defect on Confrontation; Pupils round, equal, reactive to light  3,4,6 Extraocular Movements Intact; no nystagmus  5 Facial Sensation Intact  7 No facial asymmetry  8 Intact hearing  9,10 Palate symmetric, normal gag  11 Good shoulder shrug  12 Tongue Midline  Gait: Stable, No ataxia, can perform tandem walking  Coordination: No ataxia with finger to nose testing and heel to shin testing  Sensory: Intact, Symmetric to Pinprick, Light Touch, Vibration, and Joint Position  Muscle Tone: Normal  Muscle exam  Arm Right Left Leg Right Left   Deltoid 5/5 5/5 Iliopsoas 5/5 5/5   Biceps 5/5 5/5 Quads 5/5 5/5   Triceps 5/5 5/5 Hamstrings 5/5 5/5   Wrist Extension 5/5 5/5 Ankle Dorsi Flexion 5/5 5/5   Wrist Flexion 5/5 5/5 Ankle Plantar Flexion 5/5 5/5   Interossei 5/5 5/5 Ankle Eversion 5/5 5/5   APB 5/5 5/5 Ankle Inversion 5/5 5/5       Reflexes   RJ BJ TJ KJ AJ Plantars Hay's   Right 2+ 2+ 2+ 2+ 2+ Downgoing Not present   Left 2+ 2+ 2+ 2+ 2+ Downgoing Not present         Personal review of          Mri brain, mri  MRI cervical spine, mri lumbar spine:  C5-6 mild canal severe left and mild to moderate right foraminal stenosis. Assessment/Plan:     1. Elevated ferritin level        2. Chronic nonintractable headache, unspecified headache type  Ambulatory referral to Neurology      3. Glossopharyngeal neuralgia  Ambulatory referral to Neurology      4. Lumbar disc disease  MRI lumbar spine without contrast            Patient's MRI brain finding of vein contacting b/l trigeminal nerve however patient doesn't have correlating symptoms. His symptoms do fit more for glossopharyngeal nerve or tonsils. I do believe his elevated ferritin level is contributing to some of his symptoms. I requested Dr. She Esquivel to see if they can see patient sooner. For his lower back pain, will obtain imaging to decide future management. He was not able to do PT in past for this. Counseling Documentation:  The patient and/or patient's family were  counseled regarding diagnostic results. Instructions for management,risk factor reductions,prognosis of disease were discussed. Patient and family were educated regarding impressions,risks and benefits of treatment options,importance of compliance with treatment. Total time of encounter: 60 min   More than 50% of time was spent in counseling and coordination of care of patient. Hellen Campo M.D.   [facility]  [address] [place] [address]

## 2023-07-25 NOTE — PROGRESS NOTES
Outpatient Neurology History and Physical  aMria Luisa Velasquez  [id number]  38 y.o.  1980          Consult: Yes    NETTA Balderrama      Chief Complaint   Patient presents with   • Chronic nonintractable headache, unspecified headache type           History Obtained from: patient and mother     HPI:       Maria Luisa Velasquez is a 35 yo M with PMH of Lyme presents to discuss multiple symptoms and abnormal imaging. For past 6 months, patient reports cluster of symptoms including headaches, body aches, pain, sob, flutter in heart. He had comprehensive rheumatological work up and it was negative except for very high ferritin levels and high H/H. He's awaiting appt with heme/onc. Patient has seen ENT for stabbing left side of head and throat pain. He describes this as inner tonsil pain. He was dx with glossopharyngeal neuralgia. MRI brain was ordered which revealed small vein contacts superomedial aspect of right trigeminal nerve cisternal segment. Small vein contacts lateral surface of left trigeminal nerve cisternal segment. No pathology was found for glossopharyngeal nerve or pterygopalatine fossa. He was sent to neurosurgeon but they said to first see neurology. ENT had tried tegretol for him and he took it for 4 weeks and he found no benefit from it. Patient also reports daily, constant neck and lower back pain. Denies radicular pain. He has severe DD in 2019 and was asked to consider surgery then. We don't have his prior MRI available for review.      He's had MRI brain seizure protocol in 2019 and it was normal. He says he's had seizures in past but it's not clear whether they were epileptic.        Past Medical History:   Diagnosis Date   • Chest pain    • High blood pressure    • High cholesterol    • Increased storage iron    • Migraine                Current Outpatient Medications on File Prior to Visit   Medication Sig Dispense Refill   • amLODIPine (NORVASC) 10 mg tablet Take 1 tablet by mouth daily     • ergocalciferol (VITAMIN D2) 50,000 units take 1 capsule by mouth every week with food     • fenofibrate (TRICOR) 145 mg tablet Take 1 tablet by mouth daily     • lisinopril (ZESTRIL) 10 mg tablet Take 1 tablet by mouth daily     • carBAMazepine (TEGretol XR) 200 mg 12 hr tablet take 1 tablet by mouth twice a day (Patient not taking: Reported on 7/25/2023) 30 tablet 2   • diclofenac (VOLTAREN) 75 mg EC tablet Take 1 tablet (75 mg total) by mouth 2 (two) times a day as needed (Pain) (Patient not taking: Reported on 7/25/2023) 20 tablet 0   • fluticasone (FLONASE) 50 mcg/act nasal spray 1 spray into each nostril 2 (two) times a day (Patient not taking: Reported on 7/25/2023) 1 g 6   • naproxen (Naprosyn) 500 mg tablet Take 1 tablet (500 mg total) by mouth 2 (two) times a day with meals (Patient not taking: Reported on 5/25/2023) 30 tablet 0   • rosuvastatin (CRESTOR) 10 MG tablet Take 10 mg by mouth daily (Patient not taking: Reported on 6/15/2023)       No current facility-administered medications on file prior to visit. Allergies   Allergen Reactions   • Penicillins Hives     Reaction Date: 11Feb2005;      • Shellfish-Derived Products - Food Allergy Itching         Family History   Problem Relation Age of Onset   • No Known Problems Mother    • No Known Problems Father                 History reviewed. No pertinent surgical history.         Social History     Socioeconomic History   • Marital status: Single     Spouse name: Not on file   • Number of children: Not on file   • Years of education: Not on file   • Highest education level: Not on file   Occupational History   • Not on file   Tobacco Use   • Smoking status: Never   • Smokeless tobacco: Never   Vaping Use   • Vaping Use: Never used   Substance and Sexual Activity   • Alcohol use: Never   • Drug use: Never   • Sexual activity: Not Currently   Other Topics Concern   • Not on file   Social History Narrative   • Not on file     Social Determinants of Health     Financial Resource Strain: Low Risk  (4/21/2023)    Overall Financial Resource Strain (CARDIA)    • Difficulty of Paying Living Expenses: Not hard at all   Food Insecurity: Not on file   Transportation Needs: No Transportation Needs (4/21/2023)    PRAPARE - Transportation    • Lack of Transportation (Medical): No    • Lack of Transportation (Non-Medical):  No   Physical Activity: Not on file   Stress: Not on file   Social Connections: Not on file   Intimate Partner Violence: Not on file   Housing Stability: Not on file       Review of Systems  Refer to positive review of systems in HPI  Constitutional- No fever  Eyes- No visual change  ENT- Hearing normal  CV- No chest pain  Resp- No Shortness of breath  GI- No diarrhea  - Bladder normal  MS- No Arthritis   Skin- No rash  Psych- No depression  Endo- No DM  Heme- No nodes    PHYSICAL EXAM:    Vitals:    07/25/23 1343   BP: 140/92   BP Location: Left arm   Patient Position: Sitting   Cuff Size: Standard   Pulse: 89   Temp: 97.7 °F (36.5 °C)   TempSrc: Tympanic   SpO2: 97%   Weight: 90.3 kg (199 lb)   Height: 5' 8" (1.727 m)         Appearance: No Acute Distress  Ophthalmoscopic: Disc Flat, Normal fundus  Carotid/Heart/Peripheral Vascular: No Bruits, RRR  Orientation: Awake, Alert, and Oriented x 3  Mental status:  Memory: Registation 3/3 Recall 3/3  Attention: Normal  Knowledge: Appropriate  Language: No aphasia  Speech: No dysarthria  Cranial Nerves:  2 No Visual Defect on Confrontation; Pupils round, equal, reactive to light  3,4,6 Extraocular Movements Intact; no nystagmus  5 Facial Sensation Intact  7 No facial asymmetry  8 Intact hearing  9,10 Palate symmetric, normal gag  11 Good shoulder shrug  12 Tongue Midline  Gait: Stable, No ataxia, can perform tandem walking  Coordination: No ataxia with finger to nose testing and heel to shin testing  Sensory: Intact, Symmetric to Pinprick, Light Touch, Vibration, and Joint Position  Muscle Tone: Normal  Muscle exam  Arm Right Left Leg Right Left   Deltoid 5/5 5/5 Iliopsoas 5/5 5/5   Biceps 5/5 5/5 Quads 5/5 5/5   Triceps 5/5 5/5 Hamstrings 5/5 5/5   Wrist Extension 5/5 5/5 Ankle Dorsi Flexion 5/5 5/5   Wrist Flexion 5/5 5/5 Ankle Plantar Flexion 5/5 5/5   Interossei 5/5 5/5 Ankle Eversion 5/5 5/5   APB 5/5 5/5 Ankle Inversion 5/5 5/5       Reflexes   RJ BJ TJ KJ AJ Plantars Hay's   Right 2+ 2+ 2+ 2+ 2+ Downgoing Not present   Left 2+ 2+ 2+ 2+ 2+ Downgoing Not present         Personal review of          Mri brain, mri  MRI cervical spine, mri lumbar spine:  C5-6 mild canal severe left and mild to moderate right foraminal stenosis. Assessment/Plan:     1. Elevated ferritin level        2. Chronic nonintractable headache, unspecified headache type  Ambulatory referral to Neurology      3. Glossopharyngeal neuralgia  Ambulatory referral to Neurology      4. Lumbar disc disease  MRI lumbar spine without contrast            Patient's MRI brain finding of vein contacting b/l trigeminal nerve however patient doesn't have correlating symptoms. His symptoms do fit more for glossopharyngeal nerve or tonsils. I do believe his elevated ferritin level is contributing to some of his symptoms. I requested Dr. Toni Castillo to see if they can see patient sooner. For his lower back pain, will obtain imaging to decide future management. He was not able to do PT in past for this. Counseling Documentation:  The patient and/or patient's family were  counseled regarding diagnostic results. Instructions for management,risk factor reductions,prognosis of disease were discussed. Patient and family were educated regarding impressions,risks and benefits of treatment options,importance of compliance with treatment. Total time of encounter: 60 min   More than 50% of time was spent in counseling and coordination of care of patient. Kathy Anaya M.D.   [facility]  [facility]  [place], [address] [place] [address]

## 2023-07-26 ENCOUNTER — TELEPHONE (OUTPATIENT)
Dept: HEMATOLOGY ONCOLOGY | Facility: CLINIC | Age: 43
End: 2023-07-26

## 2023-07-26 NOTE — TELEPHONE ENCOUNTER
Appointment Change  Cancel, Reschedule, Change to Virtual      Who are you speaking with? Parent   If it is not the patient, are they listed on an active communication consent form? N/A   Which provider is the appointment scheduled with? Dr. Demetrice Juarez   When is the appointment scheduled? Please list date and time  11/20/23 340   At which location is the appointment scheduled to take place? [facility]   Was the appointment rescheduled or changed from an in person visit to a virtual visit? If so, please list the details of the change. 10/30/23 340   What is the reason for the appointment change? Sooner appt   Was [facility] transport scheduled for this visit? N/A   Does [facility] transport need to be scheduled for the new visit (if applicable) N/A   Does the patient need an infusion appointment rescheduled? N/A   Does the patient have an infusion appointment scheduled? If so, when? No   Is the patient undergoing chemotherapy? N/A   Was the no-show policy reviewed for appointments being changed with less then 24 hours of notice?  N/A

## 2023-07-26 NOTE — TELEPHONE ENCOUNTER
Patient's mother aware that I will review lab work with Dr Rajinder Mckeon and get back to her by next week

## 2023-07-26 NOTE — TELEPHONE ENCOUNTER
Patient Call    Who are you speaking with? Parent    If it is not the patient, are they listed on an active communication consent form? N/A   What is the reason for this call? Referring provider wants the pt to be seen sooner due to his Ferritin level. Pt's mother asked if labs could be ordered   Does this require a call back? Yes   If a call back is required, please list best call back number [phone]   If a call back is required, advise that a message will be forwarded to their care team and someone will return their call as soon as possible. Did you relay this information to the patient?  Yes

## 2023-07-31 ENCOUNTER — TELEPHONE (OUTPATIENT)
Dept: HEMATOLOGY ONCOLOGY | Facility: CLINIC | Age: 43
End: 2023-07-31

## 2023-07-31 NOTE — TELEPHONE ENCOUNTER
Appointment Change  Cancel, Reschedule, Change to Virtual      Who are you speaking with? Parent   If it is not the patient, are they listed on an active communication consent form? Yes   Which provider is the appointment scheduled with? Dr. Antonette Lopez   When is the appointment scheduled? Please list date and time 10/30/23 3:40PM   At which location is the appointment scheduled to take place? [facility]   Was the appointment rescheduled or changed from an in person visit to a virtual visit? If so, please list the details of the change. 8/23/23 10:40AM   What is the reason for the appointment change? Trent Eaton would like the patient to be seen sooner   Was [facility] transport scheduled for this visit? No   Does [facility] transport need to be scheduled for the new visit (if applicable) No   Does the patient need an infusion appointment rescheduled? No   Does the patient have an infusion appointment scheduled? If so, when? No   Is the patient undergoing chemotherapy? No   Was the no-show policy reviewed for appointments being changed with less then 24 hours of notice?  No

## 2023-08-01 ENCOUNTER — OFFICE VISIT (OUTPATIENT)
Dept: FAMILY MEDICINE CLINIC | Facility: CLINIC | Age: 43
End: 2023-08-01
Payer: MEDICARE

## 2023-08-01 ENCOUNTER — TELEPHONE (OUTPATIENT)
Dept: HEMATOLOGY ONCOLOGY | Facility: MEDICAL CENTER | Age: 43
End: 2023-08-01

## 2023-08-01 VITALS
RESPIRATION RATE: 16 BRPM | OXYGEN SATURATION: 97 % | DIASTOLIC BLOOD PRESSURE: 82 MMHG | SYSTOLIC BLOOD PRESSURE: 138 MMHG | HEIGHT: 68 IN | TEMPERATURE: 97.2 F | HEART RATE: 90 BPM | BODY MASS INDEX: 30.77 KG/M2 | WEIGHT: 203 LBS

## 2023-08-01 DIAGNOSIS — E55.9 VITAMIN D DEFICIENCY: ICD-10-CM

## 2023-08-01 DIAGNOSIS — E78.5 DYSLIPIDEMIA: ICD-10-CM

## 2023-08-01 DIAGNOSIS — R71.8 ELEVATED FERRITIN, HEMOGLOBIN, AND RED BLOOD CELL COUNT (HCC): Primary | ICD-10-CM

## 2023-08-01 DIAGNOSIS — R07.0 CHRONIC THROAT PAIN: ICD-10-CM

## 2023-08-01 DIAGNOSIS — R79.89 ELEVATED FERRITIN, HEMOGLOBIN, AND RED BLOOD CELL COUNT (HCC): Primary | ICD-10-CM

## 2023-08-01 DIAGNOSIS — D58.2 ELEVATED FERRITIN, HEMOGLOBIN, AND RED BLOOD CELL COUNT (HCC): Primary | ICD-10-CM

## 2023-08-01 DIAGNOSIS — G89.29 CHRONIC THROAT PAIN: ICD-10-CM

## 2023-08-01 DIAGNOSIS — I10 PRIMARY HYPERTENSION: ICD-10-CM

## 2023-08-01 PROCEDURE — 99214 OFFICE O/P EST MOD 30 MIN: CPT | Performed by: NURSE PRACTITIONER

## 2023-08-01 NOTE — PATIENT INSTRUCTIONS
Heart healthy diet  Will check additional labs as discussed  Follow up with hematology as scheduled.

## 2023-08-01 NOTE — PROGRESS NOTES
Name: [facility]      : 1980      MRN: [id number]  Encounter Provider: NETTA Kruse  Encounter Date: 2023   Encounter department: [address] [place] [address]   1. Elevated ferritin, hemoglobin, and red blood cell count (HCC)  Await further eval/plan by hematology . Will obtain updated cbc and iron level. - Iron; Future  - CBC and Platelet; Future    2. Primary hypertension  Stable. Continue blood pressure medications as ordered. Monitor blood pressure. Stress management. Regular exercise  Limit salt in diet. 3. Chronic throat pain  Being managed by ENT. 4. Dyslipidemia  Refuses statin. Is taking fenofibrate- continue. Heart healthy diet. 5. Vitamin D deficiency  Continue vitamin D supplementation. Subjective      Here for follow up  Accompanied by mother  Pt with multiple physical symptoms such as body aches, joint pain, headaches, fatigue  Full rheum work up done and was negative. Saw Dr. Elizabeth Akhtar, Rheum, 2023  Ongoing issues with throat pain and ear pain. Followed by ENT. Last appt . MRI brain showed some issue with trigeminal nerve and was referred to neurosurgery. Neurosurgery required neuro eval first so saw Dr. Jasper Nam, neuro, on 2023  Was found to have high ferritin levels. Has appt with hematology 2023  Stopped many of his meds. Not taking statin. Is taking blood pressure meds. No longer taking any anti-inflammatory meds except occ aleve otc. Review of Systems   Constitutional: Positive for fatigue. HENT: Positive for ear pain and sore throat. Respiratory: Negative for shortness of breath. Cardiovascular: Negative for chest pain and palpitations. Gastrointestinal: Negative for diarrhea, nausea and vomiting. Musculoskeletal: Positive for arthralgias, back pain and myalgias. Neurological: Positive for headaches.        Current Outpatient Medications on File Prior to Visit   Medication Sig   • amLODIPine (NORVASC) 10 mg tablet Take 1 tablet by mouth daily   • ergocalciferol (VITAMIN D2) 50,000 units take 1 capsule by mouth every week with food   • fenofibrate (TRICOR) 145 mg tablet Take 1 tablet by mouth daily   • lisinopril (ZESTRIL) 10 mg tablet Take 1 tablet by mouth daily   • carBAMazepine (TEGretol XR) 200 mg 12 hr tablet take 1 tablet by mouth twice a day (Patient not taking: Reported on 7/25/2023)   • diclofenac (VOLTAREN) 75 mg EC tablet Take 1 tablet (75 mg total) by mouth 2 (two) times a day as needed (Pain) (Patient not taking: Reported on 7/25/2023)   • fluticasone (FLONASE) 50 mcg/act nasal spray 1 spray into each nostril 2 (two) times a day (Patient not taking: Reported on 7/25/2023)   • naproxen (Naprosyn) 500 mg tablet Take 1 tablet (500 mg total) by mouth 2 (two) times a day with meals (Patient not taking: Reported on 5/25/2023)   • rosuvastatin (CRESTOR) 10 MG tablet Take 10 mg by mouth daily (Patient not taking: Reported on 6/15/2023)       Objective   Labs   7/6/23 HLA-B27 negative, ferritin 939,   4/21/23 Hgb 16.6, hct 49.9  Rheum/autoimmune testing negative    6/16/2023 MRI brain  IMPRESSION:   Small vein contacts superomedial aspect of right trigeminal nerve cisternal segment. Small vein contacts lateral surface of left trigeminal nerve cisternal segment. Otherwise, normal MRI of both trigeminal nerves.     Normal MR appearance of bilateral glossopharyngeal nerves and bilateral pterygopalatine fossa.     Normal MRI of the brain. /82   Pulse 90   Temp (!) 97.2 °F (36.2 °C) (Temporal)   Resp 16   Ht 5' 8" (1.727 m)   Wt 92.1 kg (203 lb)   SpO2 97%   BMI 30.87 kg/m²     Physical Exam  Vitals reviewed. Constitutional:       General: He is not in acute distress. Appearance: He is not ill-appearing. Neck:      Vascular: No carotid bruit. Cardiovascular:      Rate and Rhythm: Normal rate and regular rhythm.    Pulmonary:      Effort: Pulmonary effort is normal. No respiratory distress. Breath sounds: Normal breath sounds. No wheezing or rales. Musculoskeletal:      Cervical back: Normal range of motion. Skin:     General: Skin is warm and dry. Coloration: Skin is not jaundiced or pale. Neurological:      General: No focal deficit present. Mental Status: He is alert and oriented to person, place, and time. Cranial Nerves: No cranial nerve deficit. Sensory: No sensory deficit. Psychiatric:         Behavior: Behavior normal.      Comments: Affect flat. Well groomed. Dressed appropriately for the weather. Calm. Pleasant . Cooperative.              NETTA Beach

## 2023-08-03 ENCOUNTER — OFFICE VISIT (OUTPATIENT)
Dept: OTOLARYNGOLOGY | Facility: CLINIC | Age: 43
End: 2023-08-03
Payer: MEDICARE

## 2023-08-03 VITALS — TEMPERATURE: 97.4 F | HEIGHT: 68 IN | WEIGHT: 201 LBS | BODY MASS INDEX: 30.46 KG/M2

## 2023-08-03 DIAGNOSIS — G89.29 CHRONIC THROAT PAIN: ICD-10-CM

## 2023-08-03 DIAGNOSIS — G52.1 GLOSSOPHARYNGEAL NEURALGIA: Primary | ICD-10-CM

## 2023-08-03 DIAGNOSIS — H92.02 OTALGIA OF LEFT EAR: ICD-10-CM

## 2023-08-03 DIAGNOSIS — R51.9 CHRONIC NONINTRACTABLE HEADACHE, UNSPECIFIED HEADACHE TYPE: ICD-10-CM

## 2023-08-03 DIAGNOSIS — G89.29 CHRONIC NONINTRACTABLE HEADACHE, UNSPECIFIED HEADACHE TYPE: ICD-10-CM

## 2023-08-03 DIAGNOSIS — R07.0 CHRONIC THROAT PAIN: ICD-10-CM

## 2023-08-03 PROCEDURE — 99214 OFFICE O/P EST MOD 30 MIN: CPT | Performed by: OTOLARYNGOLOGY

## 2023-08-03 NOTE — PROGRESS NOTES
Assessment/Plan:  The pt continues to have intractable pain, focusing in his left tonsil area. He has not improved with any of the usual treatments. He has requested tonsillectomy, and I have agreed, and made it clear that this procedure in itself is painful has no guarantee of improving his chronic pain, and carries the risks of additional complications, but he still wants to give it a try. I have scheduled him for surgery, at a date subsequent to his Hematology consult. Diagnosis ICD-10-CM Associated Orders   1. Glossopharyngeal neuralgia  G52.1       2. Otalgia of left ear  H92.02       3. Chronic throat pain  R07.0     G89.29       4. Chronic nonintractable headache, unspecified headache type  R51.9     G89.29              Subjective:      Patient ID: Trenton Bernal is a 36 y.o. male. Pt was seen by Neurology, but they didn't think the MRI findings correlated with his symptoms. He is currently being worked up for elevated H/H and ferritin. The following portions of the patient's history were reviewed and updated as appropriate: allergies, current medications, past family history, past medical history, past social history, past surgical history and problem list.    Review of Systems      Objective:      Temp (!) 97.4 °F (36.3 °C) (Temporal)   Ht 5' 8" (1.727 m)   Wt 91.2 kg (201 lb)   BMI 30.56 kg/m²           Physical Exam  Constitutional:       Appearance: He is well-developed. HENT:      Head: Normocephalic and atraumatic. Right Ear: Tympanic membrane, ear canal and external ear normal. No drainage. No middle ear effusion. Left Ear: Tympanic membrane, ear canal and external ear normal. No drainage. No middle ear effusion. Nose: Nose normal.      Mouth/Throat:      Pharynx: Uvula midline. No oropharyngeal exudate. Tonsils: 2+ on the right. 2+ on the left. Neck:      Thyroid: No thyroid mass or thyromegaly. Trachea: Trachea normal. No tracheal deviation. Cardiovascular:      Heart sounds: Normal heart sounds, S1 normal and S2 normal.   Pulmonary:      Breath sounds: Normal breath sounds and air entry. Abdominal:      General: Bowel sounds are normal.      Palpations: Abdomen is soft. Lymphadenopathy:      Cervical: No cervical adenopathy. Neurological:      Mental Status: He is alert.

## 2023-08-04 ENCOUNTER — APPOINTMENT (OUTPATIENT)
Dept: LAB | Facility: CLINIC | Age: 43
End: 2023-08-04
Payer: MEDICARE

## 2023-08-04 ENCOUNTER — TELEPHONE (OUTPATIENT)
Dept: HEMATOLOGY ONCOLOGY | Facility: CLINIC | Age: 43
End: 2023-08-04

## 2023-08-04 DIAGNOSIS — D58.2 ELEVATED FERRITIN, HEMOGLOBIN, AND RED BLOOD CELL COUNT (HCC): ICD-10-CM

## 2023-08-04 DIAGNOSIS — R71.8 ELEVATED FERRITIN, HEMOGLOBIN, AND RED BLOOD CELL COUNT (HCC): ICD-10-CM

## 2023-08-04 DIAGNOSIS — R79.89 ELEVATED FERRITIN, HEMOGLOBIN, AND RED BLOOD CELL COUNT (HCC): ICD-10-CM

## 2023-08-04 LAB
ERYTHROCYTE [DISTWIDTH] IN BLOOD BY AUTOMATED COUNT: 11.7 % (ref 11.6–15.1)
HCT VFR BLD AUTO: 52 % (ref 36.5–49.3)
HGB BLD-MCNC: 17.5 G/DL (ref 12–17)
IRON SERPL-MCNC: 86 UG/DL (ref 65–175)
MCH RBC QN AUTO: 29.9 PG (ref 26.8–34.3)
MCHC RBC AUTO-ENTMCNC: 33.7 G/DL (ref 31.4–37.4)
MCV RBC AUTO: 89 FL (ref 82–98)
PLATELET # BLD AUTO: 202 THOUSANDS/UL (ref 149–390)
PMV BLD AUTO: 11.1 FL (ref 8.9–12.7)
RBC # BLD AUTO: 5.85 MILLION/UL (ref 3.88–5.62)
WBC # BLD AUTO: 9.71 THOUSAND/UL (ref 4.31–10.16)

## 2023-08-04 PROCEDURE — 85027 COMPLETE CBC AUTOMATED: CPT

## 2023-08-04 PROCEDURE — 36415 COLL VENOUS BLD VENIPUNCTURE: CPT

## 2023-08-04 PROCEDURE — 83540 ASSAY OF IRON: CPT

## 2023-08-04 NOTE — TELEPHONE ENCOUNTER
Appointment Confirmation   Who are you speaking with? Parent   If it is not the patient, are they listed on an active communication consent form? Yes   Which provider is the appointment scheduled with? Dr. Cordell Steiner   When is the appointment scheduled? Please list date and time 8/23/23 @ 10:40am   At which location is the appointment scheduled to take place? [facility]   Did caller verbalize understanding of appointment details?  Yes

## 2023-08-17 ENCOUNTER — TELEPHONE (OUTPATIENT)
Dept: HEMATOLOGY ONCOLOGY | Facility: MEDICAL CENTER | Age: 43
End: 2023-08-17

## 2023-08-17 NOTE — TELEPHONE ENCOUNTER
I spoke with the patients mother Anyi Alvarez in regards to rescheduling the patients appt on 8/23/23 at 10:40am due to change in providers schedule. [place] understood. Provided new appt on 9/27/23 at 10:40am. Anyi Alvarez accepted new appt.

## 2023-08-19 ENCOUNTER — HOSPITAL ENCOUNTER (OUTPATIENT)
Dept: RADIOLOGY | Facility: HOSPITAL | Age: 43
Discharge: HOME | End: 2023-08-19
Attending: PSYCHIATRY & NEUROLOGY
Payer: MEDICARE

## 2023-08-19 DIAGNOSIS — M51.9 LUMBAR DISC DISEASE: ICD-10-CM

## 2023-08-19 PROCEDURE — G1004 CDSM NDSC: HCPCS

## 2023-08-19 PROCEDURE — 72148 MRI LUMBAR SPINE W/O DYE: CPT

## 2023-08-24 ENCOUNTER — TELEPHONE (OUTPATIENT)
Dept: NEUROLOGY | Facility: CLINIC | Age: 43
End: 2023-08-24

## 2023-08-24 NOTE — TELEPHONE ENCOUNTER
[facility] requesting the patient contact the office to discuss his MRI results. ----- Message from Jane Mello MD sent at 8/24/2023 12:11 PM EDT -----  Please call the patient regarding his abnormal result. His MRI LS spine shows mild degenerative disc disease at the L2-3 and L5-S1 levels without central canal narrowing. No significant finding.

## 2023-08-24 NOTE — TELEPHONE ENCOUNTER
Spoke to the patient and informed him of his MRI results, the patient wants to no the next step. I advised the patient of PT or pain management. The patient states that he will call back if he need to be referred to PT or pain management.    ----- Message from Cristi Quiñonez MD sent at 8/24/2023 12:11 PM EDT -----  Please call the patient regarding his abnormal result. His MRI LS spine shows mild degenerative disc disease at the L2-3 and L5-S1 levels without central canal narrowing. No significant finding.

## 2023-09-07 ENCOUNTER — APPOINTMENT (OUTPATIENT)
Dept: LAB | Facility: CLINIC | Age: 43
End: 2023-09-07
Payer: MEDICARE

## 2023-09-07 DIAGNOSIS — E78.5 DYSLIPIDEMIA: ICD-10-CM

## 2023-09-07 LAB
CHOLEST SERPL-MCNC: 163 MG/DL
HDLC SERPL-MCNC: 28 MG/DL
LDLC SERPL CALC-MCNC: 100 MG/DL (ref 0–100)
NONHDLC SERPL-MCNC: 135 MG/DL
TRIGL SERPL-MCNC: 174 MG/DL

## 2023-09-07 PROCEDURE — 80061 LIPID PANEL: CPT

## 2023-09-07 PROCEDURE — 36415 COLL VENOUS BLD VENIPUNCTURE: CPT

## 2023-09-08 ENCOUNTER — TELEPHONE (OUTPATIENT)
Dept: CARDIOLOGY CLINIC | Facility: CLINIC | Age: 43
End: 2023-09-08

## 2023-09-08 NOTE — TELEPHONE ENCOUNTER
----- Message from Aloha Nageotte, DO sent at 9/8/2023  1:46 PM EDT -----  Can you please let the patient know his cholesterol levels have improved significantly. Good news.   Keep up the good work

## 2023-09-14 ENCOUNTER — OFFICE VISIT (OUTPATIENT)
Dept: CARDIOLOGY CLINIC | Facility: CLINIC | Age: 43
End: 2023-09-14
Payer: MEDICARE

## 2023-09-14 VITALS
HEART RATE: 81 BPM | WEIGHT: 209 LBS | OXYGEN SATURATION: 96 % | SYSTOLIC BLOOD PRESSURE: 126 MMHG | BODY MASS INDEX: 31.67 KG/M2 | HEIGHT: 68 IN | DIASTOLIC BLOOD PRESSURE: 90 MMHG

## 2023-09-14 DIAGNOSIS — E78.5 DYSLIPIDEMIA: Primary | ICD-10-CM

## 2023-09-14 DIAGNOSIS — I10 PRIMARY HYPERTENSION: ICD-10-CM

## 2023-09-14 PROCEDURE — 99213 OFFICE O/P EST LOW 20 MIN: CPT | Performed by: INTERNAL MEDICINE

## 2023-09-14 NOTE — PROGRESS NOTES
Cardiology   Oswald Spivey DO, Francisca Day MD, Dedra Martin MD, Richard Bunch MD, [facility] [place]  -------------------------------------------------------------------  [facility]  [address] [place], [place] [phone]    Cardiology Follow Up  Marcel Keith  1980  [id number]          Assessment/Plan:    1. Dyslipidemia    2. Primary hypertension      -Patient's most recent blood work was reviewed with him. He has a significant improvement in his lipid levels with diet modification.  -He does have a family history of CAD as his brother had bypass at the age of 43  -We will obtain coronary calcium score for further restratification.  -Encouraged to restart lisinopril along with amlodipine.  - Discussed risk factor reduction including refraining from smoking, eating a diet high in fruits and vegetables, maintaining a healthy weight, limiting screen time along with controlling BP and cholesterol. Encouraged to exercise 150 minutes a week at a moderate level such as a fast walk or 75 minutes of high intensity. Interval History:     Marcel Keith is 36 y.o. male here for followup of hypertension and dyslipidemia. He has a history of hypertension and dyslipidemia. He stopped taking his medications after losing a large amount of weight. Previously, he was using fenofibrate and rosuvastatin 10 mg daily. For blood pressure, he was on lisinopril 10 mg and amlodipine 10 mg daily. Currently he is only using amlodipine  Prior to his weight loss, blood work showed a total cholesterol of 238, triglycerides of 267 and LDL of 155. He had blood work done on September 7 which showed total cholesterol 163, triglycerides 174 and LDL of 100. Since his last visit, he has been feeling well.  he denies any palpitations, chest pain, shortness of breath, LE edema, orthopnea or PND. Diet is overall unchanged. There has not been a significant change in weight. The following portions of the patient's history were reviewed and updated as appropriate: allergies, current medications, past family history, past medical history, past social history, past surgical history, and problem list.       Current Outpatient Medications:   •  amLODIPine (NORVASC) 10 mg tablet, Take 1 tablet by mouth daily, Disp: , Rfl:   •  ergocalciferol (VITAMIN D2) 50,000 units, take 1 capsule by mouth every week with food, Disp: , Rfl:   •  fenofibrate (TRICOR) 145 mg tablet, Take 1 tablet by mouth daily, Disp: , Rfl:   •  lisinopril (ZESTRIL) 10 mg tablet, Take 1 tablet by mouth daily, Disp: , Rfl:         Review of Systems:  Review of Systems   Respiratory: Negative for shortness of breath. Cardiovascular: Negative for chest pain, palpitations and leg swelling. All other systems reviewed and are negative. Physical Exam:  Vitals:  Vitals:    09/14/23 1111   BP: 126/90   BP Location: Left arm   Patient Position: Sitting   Cuff Size: Standard   Pulse: 81   SpO2: 96%   Weight: 94.8 kg (209 lb)   Height: 5' 8" (1.727 m)     Physical Exam   Constitutional: He appears healthy. No distress. Eyes: Pupils are equal, round, and reactive to light. Conjunctivae are normal.   Neck: No JVD present. Cardiovascular: Normal rate, regular rhythm and normal heart sounds. Exam reveals no gallop and no friction rub. No murmur heard. Pulmonary/Chest: Effort normal and breath sounds normal. He has no wheezes. He has no rales. Musculoskeletal:         General: No tenderness, deformity or edema. Cervical back: Normal range of motion and neck supple. Neurological: He is alert and oriented to person, place, and time. Skin: Skin is warm and dry. This note was completed in part utilizing [employer] Software.   Grammatical errors, random word insertions, spelling mistakes, and incomplete sentences can be an occasional consequence of this system secondary to software limitations, ambient noise, and hardware issues. If you have any questions or concerns about the content, text, or information contained within the body of this dictation, please contact the provider for clarification.

## 2023-09-19 ENCOUNTER — ANESTHESIA EVENT (OUTPATIENT)
Dept: PERIOP | Facility: HOSPITAL | Age: 43
End: 2023-09-19
Payer: MEDICARE

## 2023-09-21 ENCOUNTER — ANESTHESIA (OUTPATIENT)
Dept: PERIOP | Facility: HOSPITAL | Age: 43
End: 2023-09-21
Payer: MEDICARE

## 2023-09-21 ENCOUNTER — HOSPITAL ENCOUNTER (OUTPATIENT)
Facility: HOSPITAL | Age: 43
Setting detail: OUTPATIENT SURGERY
Discharge: HOME | End: 2023-09-21
Attending: OTOLARYNGOLOGY | Admitting: OTOLARYNGOLOGY
Payer: MEDICARE

## 2023-09-21 VITALS
BODY MASS INDEX: 30.87 KG/M2 | WEIGHT: 203.71 LBS | HEIGHT: 68 IN | DIASTOLIC BLOOD PRESSURE: 88 MMHG | OXYGEN SATURATION: 93 % | TEMPERATURE: 97.3 F | SYSTOLIC BLOOD PRESSURE: 142 MMHG | HEART RATE: 77 BPM | RESPIRATION RATE: 18 BRPM

## 2023-09-21 RX ORDER — ONDANSETRON 2 MG/ML
INJECTION INTRAMUSCULAR; INTRAVENOUS AS NEEDED
Status: DISCONTINUED | OUTPATIENT
Start: 2023-09-21 | End: 2023-09-21

## 2023-09-21 RX ORDER — LIDOCAINE HYDROCHLORIDE 10 MG/ML
INJECTION, SOLUTION EPIDURAL; INFILTRATION; INTRACAUDAL; PERINEURAL AS NEEDED
Status: DISCONTINUED | OUTPATIENT
Start: 2023-09-21 | End: 2023-09-21

## 2023-09-21 RX ORDER — ROCURONIUM BROMIDE 10 MG/ML
INJECTION, SOLUTION INTRAVENOUS AS NEEDED
Status: DISCONTINUED | OUTPATIENT
Start: 2023-09-21 | End: 2023-09-21

## 2023-09-21 RX ORDER — MAGNESIUM HYDROXIDE 1200 MG/15ML
LIQUID ORAL AS NEEDED
Status: DISCONTINUED | OUTPATIENT
Start: 2023-09-21 | End: 2023-09-21 | Stop reason: HOSPADM

## 2023-09-21 RX ORDER — DEXAMETHASONE SODIUM PHOSPHATE 10 MG/ML
INJECTION, SOLUTION INTRAMUSCULAR; INTRAVENOUS AS NEEDED
Status: DISCONTINUED | OUTPATIENT
Start: 2023-09-21 | End: 2023-09-21

## 2023-09-21 RX ORDER — FENTANYL CITRATE 50 UG/ML
INJECTION, SOLUTION INTRAMUSCULAR; INTRAVENOUS AS NEEDED
Status: DISCONTINUED | OUTPATIENT
Start: 2023-09-21 | End: 2023-09-21

## 2023-09-21 RX ORDER — MIDAZOLAM HYDROCHLORIDE 2 MG/2ML
INJECTION, SOLUTION INTRAMUSCULAR; INTRAVENOUS AS NEEDED
Status: DISCONTINUED | OUTPATIENT
Start: 2023-09-21 | End: 2023-09-21

## 2023-09-21 RX ORDER — FENTANYL CITRATE/PF 50 MCG/ML
50 SYRINGE (ML) INJECTION
Status: DISCONTINUED | OUTPATIENT
Start: 2023-09-21 | End: 2023-09-21 | Stop reason: HOSPADM

## 2023-09-21 RX ORDER — SODIUM CHLORIDE, SODIUM LACTATE, POTASSIUM CHLORIDE, CALCIUM CHLORIDE 600; 310; 30; 20 MG/100ML; MG/100ML; MG/100ML; MG/100ML
100 INJECTION, SOLUTION INTRAVENOUS CONTINUOUS
Status: CANCELLED | OUTPATIENT
Start: 2023-09-21

## 2023-09-21 RX ORDER — SODIUM CHLORIDE, SODIUM LACTATE, POTASSIUM CHLORIDE, CALCIUM CHLORIDE 600; 310; 30; 20 MG/100ML; MG/100ML; MG/100ML; MG/100ML
125 INJECTION, SOLUTION INTRAVENOUS CONTINUOUS
Status: DISCONTINUED | OUTPATIENT
Start: 2023-09-21 | End: 2023-09-21 | Stop reason: HOSPADM

## 2023-09-21 RX ORDER — PROPOFOL 10 MG/ML
INJECTION, EMULSION INTRAVENOUS AS NEEDED
Status: DISCONTINUED | OUTPATIENT
Start: 2023-09-21 | End: 2023-09-21

## 2023-09-21 RX ORDER — ACETAMINOPHEN 325 MG/1
650 TABLET ORAL EVERY 6 HOURS PRN
Status: DISCONTINUED | OUTPATIENT
Start: 2023-09-21 | End: 2023-09-21 | Stop reason: HOSPADM

## 2023-09-21 RX ORDER — ONDANSETRON 2 MG/ML
4 INJECTION INTRAMUSCULAR; INTRAVENOUS ONCE AS NEEDED
Status: DISCONTINUED | OUTPATIENT
Start: 2023-09-21 | End: 2023-09-21 | Stop reason: HOSPADM

## 2023-09-21 RX ADMIN — MIDAZOLAM 2 MG: 1 INJECTION INTRAMUSCULAR; INTRAVENOUS at 14:21

## 2023-09-21 RX ADMIN — LIDOCAINE HYDROCHLORIDE 50 MG: 10 INJECTION, SOLUTION EPIDURAL; INFILTRATION; INTRACAUDAL; PERINEURAL at 14:28

## 2023-09-21 RX ADMIN — ONDANSETRON 4 MG: 2 INJECTION INTRAMUSCULAR; INTRAVENOUS at 14:40

## 2023-09-21 RX ADMIN — FENTANYL CITRATE 100 MCG: 50 INJECTION, SOLUTION INTRAMUSCULAR; INTRAVENOUS at 14:28

## 2023-09-21 RX ADMIN — SODIUM CHLORIDE, SODIUM LACTATE, POTASSIUM CHLORIDE, AND CALCIUM CHLORIDE 125 ML/HR: .6; .31; .03; .02 INJECTION, SOLUTION INTRAVENOUS at 13:28

## 2023-09-21 RX ADMIN — SUGAMMADEX 600 MG: 100 INJECTION, SOLUTION INTRAVENOUS at 15:00

## 2023-09-21 RX ADMIN — ACETAMINOPHEN 650 MG: 325 TABLET ORAL at 16:29

## 2023-09-21 RX ADMIN — FENTANYL CITRATE 100 MCG: 50 INJECTION, SOLUTION INTRAMUSCULAR; INTRAVENOUS at 14:38

## 2023-09-21 RX ADMIN — ROCURONIUM BROMIDE 70 MG: 10 INJECTION, SOLUTION INTRAVENOUS at 14:28

## 2023-09-21 RX ADMIN — PROPOFOL 200 MG: 10 INJECTION, EMULSION INTRAVENOUS at 14:28

## 2023-09-21 RX ADMIN — DEXAMETHASONE SODIUM PHOSPHATE 15 MG: 10 INJECTION, SOLUTION INTRAMUSCULAR; INTRAVENOUS at 14:40

## 2023-09-21 NOTE — H&P
H&P Exam - ENT   Marybeth Koch 36 y.o. male MRN: [id number]  Unit/Bed#: OR POOL Encounter: [id number]    Assessment/Plan     Assessment:  The pt continues to have intractable pain, focusing in his left tonsil area. He has not improved with any of the usual treatments. Plan:  He has requested tonsillectomy, and I have agreed, and made it clear that this procedure in itself is painful has no guarantee of improving his chronic pain, and carries the risks of additional complications, but he still wants to give it a try. History of Present Illness   HPI:  Marybeth Koch is a 36 y.o. male who presents with left tonsil pain. Review of Systems    Historical Information   Past Medical History:   Diagnosis Date   • Chest pain    • Headache(784.0)    • High blood pressure    • High cholesterol    • Hypertension    • Increased storage iron    • Migraine      Past Surgical History:   Procedure Laterality Date   • COLONOSCOPY     • NO PAST SURGERIES       Social History   Social History     Substance and Sexual Activity   Alcohol Use Yes    Comment: Occasionally     Social History     Substance and Sexual Activity   Drug Use Never     Social History     Tobacco Use   Smoking Status Never   • Passive exposure: Never   Smokeless Tobacco Never   Tobacco Comments    Occasionally     E-Cigarette/Vaping   • E-Cigarette Use Never User      E-Cigarette/Vaping Substances   • Nicotine No    • THC No    • CBD No    • Flavoring No    • Other No    • Unknown No      Family History: non-contributory    Meds/Allergies   all medications and allergies reviewed  Allergies   Allergen Reactions   • Penicillins Hives     Reaction Date: [id number];      • Shellfish-Derived Products - Food Allergy Itching       Objective   Vitals: Blood pressure 144/85, pulse 74, temperature 97.8 °F (36.6 °C), temperature source Temporal, resp. rate 18, height 5' 8" (1.727 m), weight 92.4 kg (203 lb 11.3 oz), SpO2 98 %.     No intake or output data in the 24 hours ending 09/21/23 1351    Invasive Devices     Peripheral Intravenous Line  Duration           Peripheral IV 09/21/23 Dorsal (posterior); Right Hand <1 day                Physical Exam  Neck: no adenopathy. Oroph: nl  Chest: CTA  Heart: RR  Abd: soft, NT, nl BS. Lab Results: I have personally reviewed pertinent lab results. Imaging: I have personally reviewed pertinent reports. EKG, Pathology, and Other Studies: I have personally reviewed pertinent reports.

## 2023-09-21 NOTE — PROGRESS NOTES
Received patient from PACU. Vitals WNL, denies any pain at this time. Refreshments given and family en route to bedside. Will continue to monitor.  Gentry Mcdonald RN

## 2023-09-21 NOTE — ANESTHESIA PREPROCEDURE EVALUATION
Procedure:  TONSILLECTOMY & ADENOIDECTOMY (Throat)    Relevant Problems   ANESTHESIA (within normal limits)      CARDIO   (+) Primary hypertension      GI/HEPATIC   (+) Gastroesophageal reflux disease without esophagitis      NEURO/PSYCH   (+) Chronic neck pain      PULMONARY (within normal limits)        Physical Exam    Airway    Mallampati score: II  TM Distance: >3 FB  Neck ROM: full     Dental   No notable dental hx     Cardiovascular  Rhythm: regular, Rate: normal,     Pulmonary  Breath sounds clear to auscultation,     Other Findings        Anesthesia Plan  ASA Score- 2     Anesthesia Type- general with ASA Monitors. Additional Monitors:   Airway Plan: ETT. Plan Factors-Exercise tolerance (METS): >4 METS. Chart reviewed. Existing labs reviewed. Patient summary reviewed. Patient is not a current smoker. Induction- intravenous. Postoperative Plan- Plan for postoperative opioid use. Planned trial extubation    Informed Consent- Anesthetic plan and risks discussed with patient and mother. I personally reviewed this patient with the CRNA. Discussed and agreed on the Anesthesia Plan with the CRNA. Senthil Speaker

## 2023-09-21 NOTE — OP NOTE
OPERATIVE REPORT  PATIENT NAME: Jamal Diaz    :  1980  MRN: [id number]  Pt Location: [facility] OR ROOM 02    SURGERY DATE: 2023    Surgeon(s) and Role:     * Yohana Grijalva MD - Primary    Preop Diagnosis:  Chronic throat pain [R07.0, G89.29]  Glossopharyngeal neuralgia [G52.1]    Post-Op Diagnosis Codes:     * Chronic throat pain [R07.0, G89.29]     * Glossopharyngeal neuralgia [G52.1]    Procedure(s):  TONSILLECTOMY    Specimen(s):  * No specimens in log *    Estimated Blood Loss:   Minimal    Drains:  * No LDAs found *    Anesthesia Type:   General    Operative Indications:  Chronic throat pain [R07.0, G89.29]  Glossopharyngeal neuralgia [G52.1]    Operative Findings:  Small tonsils with tonsil stones bilaterally. Complications:   None    Procedure and Technique:  After induction of general endotracheal anesthesia, the patient was draped in the sterile fashion. A Lisa-Keyon mouth gag was used to expose the oral cavity, and a red rubber catheter was used to retract the soft palate. The right palatine tonsil was grasped with an Bridgett clamp, and was dissected from its bed using the Bovie electrocautery. The same procedure was performed on the opposite side. Bleeding was controlled using the suction electrocautery. The adenoids were viewed using a laryngeal mirror, but there was no appreciable adenoid tissue to remove. At the end of the procedure, all instruments were removed. When the patient awoke from general anesthesia, he was extubated and transferred to the recovery room in satisfactory condition. I was present for the entire procedure.     Patient Disposition:  PACU         SIGNATURE: Yohana Grijalva MD  DATE: 2023  TIME: 2:53 PM

## 2023-09-21 NOTE — ANESTHESIA POSTPROCEDURE EVALUATION
Post-Op Assessment Note    CV Status:  Stable  Pain Score: 0    Pain management: adequate     Mental Status:  Sleepy and arousable   Hydration Status:  Stable   PONV Controlled:  None   Airway Patency:  Patent      Post Op Vitals Reviewed: Yes      Staff: CRNA         No notable events documented.  /  BP   116/65   Temp      Pulse  72   Resp   14   SpO2   99

## 2023-09-22 ENCOUNTER — TELEPHONE (OUTPATIENT)
Dept: OTOLARYNGOLOGY | Facility: CLINIC | Age: 43
End: 2023-09-22

## 2023-09-22 DIAGNOSIS — G89.18 POSTOPERATIVE PAIN: Primary | ICD-10-CM

## 2023-09-22 RX ORDER — OXYCODONE HYDROCHLORIDE AND ACETAMINOPHEN 5; 325 MG/1; MG/1
1 TABLET ORAL EVERY 4 HOURS PRN
Qty: 15 TABLET | Refills: 0 | Status: SHIPPED | OUTPATIENT
Start: 2023-09-22 | End: 2023-09-26 | Stop reason: SDUPTHER

## 2023-09-22 NOTE — TELEPHONE ENCOUNTER
Mother of patient called to ask if Dr. Millicent Teixeira could send in "something stronger" for the patient. Patient is currently taking OTC tylenol & advil about every 4 hours & it is not helping. Patient is S/P T&A 9/21/23.

## 2023-09-27 ENCOUNTER — CONSULT (OUTPATIENT)
Dept: HEMATOLOGY ONCOLOGY | Facility: MEDICAL CENTER | Age: 43
End: 2023-09-27
Payer: MEDICARE

## 2023-09-27 VITALS
TEMPERATURE: 98.6 F | WEIGHT: 208 LBS | HEIGHT: 68 IN | OXYGEN SATURATION: 97 % | RESPIRATION RATE: 17 BRPM | BODY MASS INDEX: 31.52 KG/M2 | HEART RATE: 83 BPM | DIASTOLIC BLOOD PRESSURE: 88 MMHG | SYSTOLIC BLOOD PRESSURE: 140 MMHG

## 2023-09-27 DIAGNOSIS — D58.2 ELEVATED HEMOGLOBIN (HCC): ICD-10-CM

## 2023-09-27 DIAGNOSIS — R79.89 HIGH SERUM FERRITIN: ICD-10-CM

## 2023-09-27 PROCEDURE — 99205 OFFICE O/P NEW HI 60 MIN: CPT | Performed by: INTERNAL MEDICINE

## 2023-09-27 NOTE — PROGRESS NOTES
Thania Silva  1980  [facility] [place] [address] [address] [facility] [id number]  HEMATOLOGY/ONCOLOGY CONSULTATION REPORT    DISCUSSION/SUMMARY:    58-year-old male with history of good general health but more recently with throat pain, generalized body aches and decreased activities. Work-up demonstrated an elevated ferritin level. Presently Mr. Adiel Avitia feels +/- but this is likely due to the recent surgery and body aches. ENT follow-up is scheduled for later on this week. Patient will also follow-up with rheumatology as directed. We discussed the laboratory test results including the elevated ferritin level. Additional blood tests have been requested. Results will dictate further work-up and treatment options. It may be that the ferritin level is secondary/reactive. Disease processes associated with elevated ferritin include inflammatory conditions, chronic kidney disease, RA/autoimmune disorders, acute infectious, malignancy, anemia of chronic disease, diabetes, metabolic syndrome, atherosclerosis, fatty liver, anorexia, Graves' disease, arrhythmias, chronic hepatitis C infection, Stills disease, hemophagocytic syndrome and sideroblastic anemia. Present available laboratory parameters argue against hemophagocytic syndrome and sideroblastic anemia. Patient has had a number of symptoms associated with Stills disease (sore throat, achy and swollen joints, muscle pain, elevated ferritin level). I believe patient has also had night sweats but has not specifically checked his temperature at home. Work-up for an elevated ferritin level (Journal of Clinical Medicine 2021 May; 10 (9): 2008). The elevated H/H values may also be secondary/reactive. Prior WBCs were elevated, no differential to comment on. Platelet count has always been within normal limits.   Patient gave up cigarettes many years ago, has no pulmonary issues. Polycythemia is a disease state in which the hematocrit (the volume percent of red blood cells in the blood) is elevated, usually >55%. Polycythemia is sometimes referred to his erythrocytosis but the 2 terms or not synonymous. Polycythemia refers to any increase in red blood cells whereas erythrocytosis only refers to a documented increase of the red cell mass. The overproduction of red blood cells may be due to a primary process in the bone marrow or may be a reaction to chronically low oxygen levels or rarely a malignancy. Primary polycythemia is seen in patients with polycythemia vera or other myeloproliferative disorders. Primary familial polycythemia exists as a hereditary condition and is due to an autosomal dominant mutation in the EPOR erythropoietin receptor gene. Laboratory test for a myeloproliferative disorder has been ordered. Secondary polycythemia is caused from either natural or artificial increases in the production of erythropoietin. This can be seen in patients living at high attitudes, from hypoxia ( tobacco use, cyanotic heart disease, COPD, sleep apnea) iatrogenic (blood doping), from testosterone/anabolic steroids, patient is on erythropoietin and from neoplasms (renal cell carcinoma, certain liver tumors, pheochromocytoma, adrenal adenoma). There are other inherited mutations with increased stability of hypoxia inducible factors that can raise the hemoglobin level. Scans looking for evidence of malignancy are not yet indicated, the plan is to wait for the laboratory test results first.    Relative polycythemia demonstrates a rise in the erythrocyte level due to reduced blood plasma (hypovolemia, dehydration). This is usually very clinically obvious. The above was discussed with Mr. Humphreys's mother; all questions were answered. Patient is to return in approximately 6 weeks when the laboratory test results are available.   Mr. Keo Stevens knows to call the hematology/oncology office if there are any other questions or concerns. Carefully review your medication list and verify that the list is accurate and up-to-date. Please call the hematology/oncology office if there are medications missing from the list, medications on the list that you are not currently taking or if there is a dosage or instruction that is different from how you're taking that medication. Patient goals and areas of care: Work-up the ferritin level and elevated H/H levels  Barriers to care: None  Patient is able to self-care  ______________________________________________________________________________________    Chief Complaint   Patient presents with   • Consult   • Elevated ferritin level     History of Present Illness: 43-year-old male with history of hypertension, chronic throat pain, dyslipidemia, progressive body aches and upper extremity pain/weakness (seen by rheumatology) referred for evaluation of an elevated ferritin level. Patient also has elevated H/H levels. Mr. Keo Stevens states feeling +/-. Patient underwent tonsillectomy approximately a week ago, still recovering. Patient has dysphagia but no bleeding, no choking or regurgitation. Appetite is otherwise okay. Activities have been more limited recently because of the surgery and upper extremity body aches. No fevers, chills or sweats. No headaches, blurred vision or dizziness. No  problems. Review of Systems   Constitutional: Positive for fatigue. HENT: Positive for sore throat. Eyes: Negative. Respiratory: Negative. Cardiovascular: Negative. Gastrointestinal: Negative. Endocrine: Negative. Genitourinary: Negative. Musculoskeletal: Positive for arthralgias. Skin: Negative. Allergic/Immunologic: Negative. Neurological: Positive for weakness. Hematological: Negative. Psychiatric/Behavioral: Negative. All other systems reviewed and are negative.     Patient Active Problem List   Diagnosis   • Otalgia of left ear   • Chronic throat pain   • Chronic neck pain   • Dyslipidemia   • Vitamin D deficiency   • Gastroesophageal reflux disease without esophagitis   • Primary hypertension   • High serum ferritin   • Elevated hemoglobin (HCC)     Past Medical History:   Diagnosis Date   • Chest pain    • Headache(784.0)    • High blood pressure    • High cholesterol    • Hypertension    • Increased storage iron    • Migraine      Past Surgical History:   Procedure Laterality Date   • COLONOSCOPY     • NO PAST SURGERIES     • CO TONSILLECTOMY & ADENOIDECTOMY AGE 11/> N/A 9/21/2023    Procedure: TONSILLECTOMY;  Surgeon: Anju Alberts MD;  Location: [facility];  Service: ENT     Family History   Problem Relation Age of Onset   • Cancer Mother         Breast cancer   • Cancer Father      Social History     Socioeconomic History   • Marital status: Single     Spouse name: Not on file   • Number of children: Not on file   • Years of education: Not on file   • Highest education level: Not on file   Occupational History   • Not on file   Tobacco Use   • Smoking status: Never     Passive exposure: Never   • Smokeless tobacco: Never   • Tobacco comments:     Occasionally   Vaping Use   • Vaping Use: Never used   Substance and Sexual Activity   • Alcohol use: Yes     Comment: Occasionally   • Drug use: Never   • Sexual activity: Not Currently     Birth control/protection: None   Other Topics Concern   • Not on file   Social History Narrative   • Not on file     Social Determinants of Health     Financial Resource Strain: Low Risk  (4/21/2023)    Overall Financial Resource Strain (CARDIA)    • Difficulty of Paying Living Expenses: Not hard at all   Food Insecurity: Not on file   Transportation Needs: No Transportation Needs (4/21/2023)    PRAPARE - Transportation    • Lack of Transportation (Medical): No    • Lack of Transportation (Non-Medical):  No   Physical Activity: Not on file   Stress: Not on file   Social Connections: Not on file   Intimate Partner Violence: Not on file   Housing Stability: Not on file   Social history: Patient discontinued tobacco use many years ago, no drug or alcohol abuse, patient works as a , limited exposure to gasoline, metal dusts etc    Current Outpatient Medications:   •  amLODIPine (NORVASC) 10 mg tablet, Take 1 tablet by mouth daily, Disp: , Rfl:   •  ergocalciferol (VITAMIN D2) 50,000 units, take 1 capsule by mouth every week with food, Disp: , Rfl:   •  lisinopril (ZESTRIL) 10 mg tablet, Take 1 tablet by mouth daily, Disp: , Rfl:   •  oxyCODONE-acetaminophen (Percocet) 5-325 mg per tablet, Take 1 tablet by mouth every 4 (four) hours as needed for severe pain Max Daily Amount: 6 tablets, Disp: 15 tablet, Rfl: 0    Allergies   Allergen Reactions   • Penicillins Hives     Reaction Date: 11Feb2005;      • Shellfish-Derived Products - Food Allergy Itching       Vitals:    09/27/23 1034   BP: 140/88   Pulse: 83   Resp: 17   Temp: 98.6 °F (37 °C)   SpO2: 97%     Physical Exam  Constitutional:       Appearance: He is well-developed. HENT:      Head: Normocephalic and atraumatic. Right Ear: External ear normal.      Left Ear: External ear normal.      Mouth/Throat:      Comments: Oropharynx with 2 large white patches posteriorly, no discharge or bleeding, anterior mucosa is moist and pink  Eyes:      Pupils: Pupils are equal, round, and reactive to light. Comments: Conjunctivae are red/inflamed, no signs of infection or discharge   Cardiovascular:      Rate and Rhythm: Normal rate and regular rhythm. Heart sounds: Normal heart sounds. Pulmonary:      Effort: Pulmonary effort is normal.      Breath sounds: Normal breath sounds. Comments: Clear bilaterally  Abdominal:      General: Bowel sounds are normal.      Palpations: Abdomen is soft. Musculoskeletal:         General: Normal range of motion.       Cervical back: Normal range of motion and neck supple. Skin:     General: Skin is warm. Comments: Good color, warm, moist, no petechiae or ecchymoses   Neurological:      Mental Status: He is alert and oriented to person, place, and time. Deep Tendon Reflexes: Reflexes are normal and symmetric. Psychiatric:         Behavior: Behavior normal.         Thought Content: Thought content normal.         Judgment: Judgment normal.     Extremities: no lower extreme edema bilaterally, no cords, pulses are 1+   lymphatics: No adenopathy in the neck, supraclavicular region, axilla and groin bilaterally    Labs        8//2023 iron = 86    7/6/2023 ESR = 21 ferritin = 939    4/21/2023 BUN = 15 creatinine = 1.05 calcium = 9.4 AST = 23 ALT = 52 alkaline phosphatase = 95 total protein = 8.3 albumin = 4.4 total bilirubin = 0.47    Imaging    8/19/2023 MRI lumbar spine without contrast    IMPRESSION:     The lumbar vertebral body heights are maintained demonstrating no acute fracture or subluxation.     Mild degenerative disc disease at the L2-3 and L5-S1 levels without central canal narrowing.

## 2023-09-28 ENCOUNTER — OFFICE VISIT (OUTPATIENT)
Dept: OTOLARYNGOLOGY | Facility: CLINIC | Age: 43
End: 2023-09-28

## 2023-09-28 VITALS — BODY MASS INDEX: 31.52 KG/M2 | WEIGHT: 208 LBS | HEIGHT: 68 IN | TEMPERATURE: 97.5 F

## 2023-09-28 DIAGNOSIS — G89.29 CHRONIC THROAT PAIN: ICD-10-CM

## 2023-09-28 DIAGNOSIS — Z90.89 S/P TONSILLECTOMY: Primary | ICD-10-CM

## 2023-09-28 DIAGNOSIS — R07.0 CHRONIC THROAT PAIN: ICD-10-CM

## 2023-09-28 PROCEDURE — 99024 POSTOP FOLLOW-UP VISIT: CPT | Performed by: OTOLARYNGOLOGY

## 2023-09-28 NOTE — PROGRESS NOTES
Assessment/Plan:  Healing well. F/u in 1 month. Diagnosis ICD-10-CM Associated Orders   1. S/P tonsillectomy  Z90.89       2. Chronic throat pain  R07.0     G89.29              Subjective:      Patient ID: Dyane Paget is a 36 y.o. male. 1 wk s/p tonsillectomy. Pt is doing well. The following portions of the patient's history were reviewed and updated as appropriate: allergies, current medications, past family history, past medical history, past social history, past surgical history and problem list.    Review of Systems      Objective:      Temp 97.5 °F (36.4 °C) (Temporal)   Ht 5' 8" (1.727 m)   Wt 94.3 kg (208 lb)   BMI 31.63 kg/m²          Physical Exam  Constitutional:       Appearance: He is well-developed. HENT:      Head: Normocephalic and atraumatic. Right Ear: Tympanic membrane, ear canal and external ear normal. No drainage. No middle ear effusion. Left Ear: Tympanic membrane, ear canal and external ear normal. No drainage. No middle ear effusion. Nose: Nose normal.      Mouth/Throat:      Pharynx: Uvula midline. No oropharyngeal exudate. Tonsils: Tonsillar exudate (nl post-op exudate) present. 0 on the right. 0 on the left. Neck:      Thyroid: No thyroid mass or thyromegaly. Trachea: Trachea normal. No tracheal deviation. Lymphadenopathy:      Cervical: No cervical adenopathy. Neurological:      Mental Status: He is alert.

## 2023-10-16 ENCOUNTER — APPOINTMENT (OUTPATIENT)
Dept: LAB | Facility: CLINIC | Age: 43
End: 2023-10-16
Payer: MEDICARE

## 2023-10-16 ENCOUNTER — TELEPHONE (OUTPATIENT)
Dept: HEMATOLOGY ONCOLOGY | Facility: CLINIC | Age: 43
End: 2023-10-16

## 2023-10-16 DIAGNOSIS — D58.2 ELEVATED HEMOGLOBIN (HCC): ICD-10-CM

## 2023-10-16 DIAGNOSIS — R79.89 HIGH SERUM FERRITIN: ICD-10-CM

## 2023-10-16 LAB
ALBUMIN SERPL BCP-MCNC: 4.5 G/DL (ref 3.5–5)
ALP SERPL-CCNC: 99 U/L (ref 34–104)
ALT SERPL W P-5'-P-CCNC: 72 U/L (ref 7–52)
ANION GAP SERPL CALCULATED.3IONS-SCNC: 9 MMOL/L
AST SERPL W P-5'-P-CCNC: 34 U/L (ref 13–39)
BASOPHILS # BLD AUTO: 0.06 THOUSANDS/ÂΜL (ref 0–0.1)
BASOPHILS NFR BLD AUTO: 1 % (ref 0–1)
BILIRUB SERPL-MCNC: 0.77 MG/DL (ref 0.2–1)
BUN SERPL-MCNC: 17 MG/DL (ref 5–25)
CALCIUM SERPL-MCNC: 9.3 MG/DL (ref 8.4–10.2)
CHLORIDE SERPL-SCNC: 103 MMOL/L (ref 96–108)
CO2 SERPL-SCNC: 27 MMOL/L (ref 21–32)
CREAT SERPL-MCNC: 0.8 MG/DL (ref 0.6–1.3)
CRP SERPL QL: 2 MG/L
EOSINOPHIL # BLD AUTO: 0.25 THOUSAND/ÂΜL (ref 0–0.61)
EOSINOPHIL NFR BLD AUTO: 3 % (ref 0–6)
ERYTHROCYTE [DISTWIDTH] IN BLOOD BY AUTOMATED COUNT: 13.2 % (ref 11.6–15.1)
ERYTHROCYTE [SEDIMENTATION RATE] IN BLOOD: 14 MM/HOUR (ref 0–14)
GFR SERPL CREATININE-BSD FRML MDRD: 109 ML/MIN/1.73SQ M
GLUCOSE P FAST SERPL-MCNC: 87 MG/DL (ref 65–99)
HAV IGM SER QL: NORMAL
HBV CORE IGM SER QL: NORMAL
HBV SURFACE AG SER QL: NORMAL
HCT VFR BLD AUTO: 53.5 % (ref 36.5–49.3)
HCV AB SER QL: NORMAL
HGB BLD-MCNC: 17.9 G/DL (ref 12–17)
IMM GRANULOCYTES # BLD AUTO: 0.12 THOUSAND/UL (ref 0–0.2)
IMM GRANULOCYTES NFR BLD AUTO: 1 % (ref 0–2)
LYMPHOCYTES # BLD AUTO: 2.65 THOUSANDS/ÂΜL (ref 0.6–4.47)
LYMPHOCYTES NFR BLD AUTO: 28 % (ref 14–44)
MCH RBC QN AUTO: 29.6 PG (ref 26.8–34.3)
MCHC RBC AUTO-ENTMCNC: 33.5 G/DL (ref 31.4–37.4)
MCV RBC AUTO: 88 FL (ref 82–98)
MONOCYTES # BLD AUTO: 0.78 THOUSAND/ÂΜL (ref 0.17–1.22)
MONOCYTES NFR BLD AUTO: 8 % (ref 4–12)
NEUTROPHILS # BLD AUTO: 5.46 THOUSANDS/ÂΜL (ref 1.85–7.62)
NEUTS SEG NFR BLD AUTO: 59 % (ref 43–75)
NRBC BLD AUTO-RTO: 0 /100 WBCS
PLATELET # BLD AUTO: 257 THOUSANDS/UL (ref 149–390)
PMV BLD AUTO: 10.4 FL (ref 8.9–12.7)
POTASSIUM SERPL-SCNC: 4.8 MMOL/L (ref 3.5–5.3)
PROT SERPL-MCNC: 7.5 G/DL (ref 6.4–8.4)
RBC # BLD AUTO: 6.05 MILLION/UL (ref 3.88–5.62)
SODIUM SERPL-SCNC: 139 MMOL/L (ref 135–147)
WBC # BLD AUTO: 9.32 THOUSAND/UL (ref 4.31–10.16)

## 2023-10-16 PROCEDURE — 88374 M/PHMTRC ALYS ISHQUANT/SEMIQ: CPT

## 2023-10-16 PROCEDURE — 80074 ACUTE HEPATITIS PANEL: CPT

## 2023-10-16 PROCEDURE — 86140 C-REACTIVE PROTEIN: CPT

## 2023-10-16 PROCEDURE — 36415 COLL VENOUS BLD VENIPUNCTURE: CPT

## 2023-10-16 PROCEDURE — 80053 COMPREHEN METABOLIC PANEL: CPT

## 2023-10-16 PROCEDURE — 88185 FLOWCYTOMETRY/TC ADD-ON: CPT | Performed by: INTERNAL MEDICINE

## 2023-10-16 PROCEDURE — 85025 COMPLETE CBC W/AUTO DIFF WBC: CPT

## 2023-10-16 PROCEDURE — 85652 RBC SED RATE AUTOMATED: CPT

## 2023-10-16 PROCEDURE — 88184 FLOWCYTOMETRY/ TC 1 MARKER: CPT

## 2023-10-16 NOTE — TELEPHONE ENCOUNTER
Lab Inquiry   Who are you speaking with? Parent     If it is not the patient, are they listed on an active communication consent form? N/A   Name of ordering provider Dr. Srini Castle   What is being requested? Lab results to be reviewed   Lab draw location [facility]   What is the best call back number?  [phone]                                                 Labs need auth from insurance

## 2023-10-16 NOTE — TELEPHONE ENCOUNTER
Left voice mail on phone indicating questions regarding insurance authorization. Jak2 test requires prior authorization, so indicated that it will take some time before approving of test, and that once it is approved central scheduling will be notified and contact patient, informing test is ready to be performed.

## 2023-10-16 NOTE — TELEPHONE ENCOUNTER
Spoke with mother regarding patient's lab work. Informed her that lab results would take roughly a week to be resulted before results would be available. Mother informed that insurance did not allow for all tests to be resulted, so that I would reconvene in the next 24-48 hours and figure out if insurance validates rest of blood work.

## 2023-10-18 ENCOUNTER — TELEPHONE (OUTPATIENT)
Dept: HEMATOLOGY ONCOLOGY | Facility: CLINIC | Age: 43
End: 2023-10-18

## 2023-10-18 ENCOUNTER — HOSPITAL ENCOUNTER (OUTPATIENT)
Dept: CT IMAGING | Facility: HOSPITAL | Age: 43
Discharge: HOME | End: 2023-10-18
Payer: COMMERCIAL

## 2023-10-18 DIAGNOSIS — E78.5 DYSLIPIDEMIA: ICD-10-CM

## 2023-10-18 PROCEDURE — G1004 CDSM NDSC: HCPCS

## 2023-10-18 PROCEDURE — 75571 CT HRT W/O DYE W/CA TEST: CPT

## 2023-10-18 NOTE — TELEPHONE ENCOUNTER
Call Transfer   Who are you speaking with? Parent   If it is not the patient, are they listed on an active communication consent form? Yes   Who is the patients HemOnc/SurgOnc provider? Dr. Yusef Lopes   What is the reason for this call? PAUL test   Person/Department that the call was transferred to? Time that call was transferred? central scheduling   Your call will be transferred now. If you receive a voicemail, please leave a detailed message and a member of the team will return your call as soon as possible. Did you relay this information to the caller?   N/A

## 2023-10-19 LAB — SCAN RESULT: NORMAL

## 2023-10-20 ENCOUNTER — TELEPHONE (OUTPATIENT)
Dept: HEMATOLOGY ONCOLOGY | Facility: MEDICAL CENTER | Age: 43
End: 2023-10-20

## 2023-10-20 LAB — MISCELLANEOUS LAB TEST RESULT: NORMAL

## 2023-10-23 ENCOUNTER — TELEPHONE (OUTPATIENT)
Dept: CARDIOLOGY CLINIC | Facility: CLINIC | Age: 43
End: 2023-10-23

## 2023-10-23 LAB — SCAN RESULT: NORMAL

## 2023-10-23 NOTE — TELEPHONE ENCOUNTER
----- Message from Jameel Meraz, [facility] sent at 10/21/2023  9:28 AM EDT -----  Coronary calcium score is 0. This is excellent.   Keep up the good work and continue current medications

## 2023-10-26 ENCOUNTER — OFFICE VISIT (OUTPATIENT)
Dept: OTOLARYNGOLOGY | Facility: CLINIC | Age: 43
End: 2023-10-26

## 2023-10-26 VITALS — TEMPERATURE: 97.4 F | WEIGHT: 209 LBS | HEIGHT: 68 IN | BODY MASS INDEX: 31.67 KG/M2

## 2023-10-26 DIAGNOSIS — Z90.89 S/P TONSILLECTOMY: Primary | ICD-10-CM

## 2023-10-26 DIAGNOSIS — G52.1 GLOSSOPHARYNGEAL NEURALGIA: ICD-10-CM

## 2023-10-26 PROCEDURE — 99024 POSTOP FOLLOW-UP VISIT: CPT | Performed by: OTOLARYNGOLOGY

## 2023-10-26 NOTE — PROGRESS NOTES
Assessment/Plan:  Tonsillectomy healed well. The patient's chronic pain is significantly improved, but not completely. F/u in 1 month. Consider scar release in left soft palate/uvula area. Diagnosis ICD-10-CM Associated Orders   1. S/P tonsillectomy  Z90.89       2. 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 male. 1 month f/u s/p tonsillectomy for chronic pain. He notes that the pain is much better, though he still has some pain when he swallows, in the area of the left side of the soft palate and uvula. The following portions of the patient's history were reviewed and updated as appropriate: allergies, current medications, past family history, past medical history, past social history, past surgical history and problem list.    Review of Systems      Objective:      Temp (!) 97.4 °F (36.3 °C) (Temporal)   Ht 5' 8" (1.727 m)   Wt 94.8 kg (209 lb)   BMI 31.78 kg/m²          Physical Exam  Constitutional:       Appearance: He is well-developed. HENT:      Head: Normocephalic and atraumatic. Right Ear: Tympanic membrane, ear canal and external ear normal. No drainage. No middle ear effusion. Left Ear: Tympanic membrane, ear canal and external ear normal. No drainage. No middle ear effusion. Nose: Nose normal.      Mouth/Throat:      Pharynx: Uvula midline. No oropharyngeal exudate. Tonsils: 0 on the right. 0 on the left. Neck:      Thyroid: No thyroid mass or thyromegaly. Trachea: Trachea normal. No tracheal deviation. Cardiovascular:      Heart sounds: Normal heart sounds, S1 normal and S2 normal.   Pulmonary:      Breath sounds: Normal breath sounds and air entry. Abdominal:      General: Bowel sounds are normal.      Palpations: Abdomen is soft. Lymphadenopathy:      Cervical: No cervical adenopathy. Neurological:      Mental Status: He is alert.

## 2023-10-31 ENCOUNTER — TELEPHONE (OUTPATIENT)
Dept: HEMATOLOGY ONCOLOGY | Facility: CLINIC | Age: 43
End: 2023-10-31

## 2023-10-31 NOTE — TELEPHONE ENCOUNTER
Patient Call    Who are you speaking with? Parent    If it is not the patient, are they listed on an active communication consent form? Yes   What is the reason for this call? She is asking for an auth update for labwork   Does this require a call back? Yes   If a call back is required, please list best call back number [phone]   If a call back is required, advise that a message will be forwarded to their care team and someone will return their call as soon as possible. Did you relay this information to the patient?  Yes

## 2023-10-31 NOTE — TELEPHONE ENCOUNTER
[11:53 AM] Pineda payan Graettinger should call him  or he can call [phone] to schedule     Patient's mother aware

## 2023-11-04 ENCOUNTER — APPOINTMENT (OUTPATIENT)
Dept: LAB | Facility: HOSPITAL | Age: 43
End: 2023-11-04
Attending: INTERNAL MEDICINE
Payer: MEDICARE

## 2023-11-04 DIAGNOSIS — D58.2 ELEVATED HEMOGLOBIN (HCC): ICD-10-CM

## 2023-11-04 DIAGNOSIS — E83.119 HEMOCHROMATOSIS, UNSPECIFIED HEMOCHROMATOSIS TYPE: ICD-10-CM

## 2023-11-04 DIAGNOSIS — R79.89 HIGH SERUM FERRITIN: ICD-10-CM

## 2023-11-04 PROCEDURE — 36415 COLL VENOUS BLD VENIPUNCTURE: CPT

## 2023-11-06 ENCOUNTER — TELEPHONE (OUTPATIENT)
Dept: HEMATOLOGY ONCOLOGY | Facility: MEDICAL CENTER | Age: 43
End: 2023-11-06

## 2023-11-06 DIAGNOSIS — R79.89 HIGH SERUM FERRITIN: Primary | ICD-10-CM

## 2023-11-06 DIAGNOSIS — D75.1 POLYCYTHEMIA: ICD-10-CM

## 2023-11-06 DIAGNOSIS — D58.2 ELEVATED HEMOGLOBIN (HCC): ICD-10-CM

## 2023-11-06 NOTE — TELEPHONE ENCOUNTER
NELSON 2 not resulted  Test not showing as resulted per central scheduler  Will send to [facility] [address] team to advise    Nelson 2 now authorized  Patient scheduled for 11/8/2023 after appt with Dr Shanta Romero  Patient's mother aware

## 2023-11-06 NOTE — TELEPHONE ENCOUNTER
Verified that the JAK2 test was not completed on 11/4/23.   Informed the RN of the situation, rerouted

## 2023-11-06 NOTE — TELEPHONE ENCOUNTER
Spoke with Elizabeth Gonzalez in [facility] lab regarding the JAK2 test that was supposed to be done.   Elizabeth Gonzalez indicated that she did not have the authorization to conduct the JAK2 test, hence why it was not done, will follow-up with scheduling to advise on status of auth for Jak2

## 2023-11-07 LAB — MISCELLANEOUS LAB TEST RESULT: NORMAL

## 2023-11-08 ENCOUNTER — APPOINTMENT (OUTPATIENT)
Dept: LAB | Facility: HOSPITAL | Age: 43
End: 2023-11-08

## 2023-11-08 ENCOUNTER — APPOINTMENT (OUTPATIENT)
Dept: LAB | Facility: HOSPITAL | Age: 43
End: 2023-11-08
Attending: INTERNAL MEDICINE
Payer: MEDICARE

## 2023-11-08 ENCOUNTER — OFFICE VISIT (OUTPATIENT)
Dept: HEMATOLOGY ONCOLOGY | Facility: MEDICAL CENTER | Age: 43
End: 2023-11-08
Payer: MEDICARE

## 2023-11-08 VITALS
HEART RATE: 102 BPM | BODY MASS INDEX: 33.34 KG/M2 | WEIGHT: 220 LBS | DIASTOLIC BLOOD PRESSURE: 88 MMHG | RESPIRATION RATE: 17 BRPM | HEIGHT: 68 IN | SYSTOLIC BLOOD PRESSURE: 156 MMHG | OXYGEN SATURATION: 97 %

## 2023-11-08 DIAGNOSIS — R79.89 HIGH SERUM FERRITIN: ICD-10-CM

## 2023-11-08 DIAGNOSIS — R79.89 HIGH SERUM FERRITIN: Primary | ICD-10-CM

## 2023-11-08 DIAGNOSIS — D58.2 ELEVATED HEMOGLOBIN (HCC): ICD-10-CM

## 2023-11-08 DIAGNOSIS — D75.1 POLYCYTHEMIA: ICD-10-CM

## 2023-11-08 PROCEDURE — 99215 OFFICE O/P EST HI 40 MIN: CPT | Performed by: INTERNAL MEDICINE

## 2023-11-08 PROCEDURE — 36415 COLL VENOUS BLD VENIPUNCTURE: CPT

## 2023-11-08 RX ORDER — FENOFIBRATE 145 MG/1
145 TABLET, COATED ORAL DAILY
COMMUNITY
Start: 2023-11-06

## 2023-11-08 NOTE — PROGRESS NOTES
Lainey Silva  1980  [facility] [place] [facility] [place] [address] [address] [facility] [id number]    DISCUSSION/SUMMARY:    79-year-old male with history of good general health but more recently with throat pain, generalized body aches and decreased activities. Patient was seen by ENT and underwent tonsillectomy recently. Patient continues to heal, swallowing is better, closer to normal.  ENT follow-ups are ongoing. Additional work-up demonstrated an elevated ferritin level. CBC parameters have demonstrated elevated H/H levels only. Presently Mr. Lori Lane feels well, better than before, clinically there are no concerning findings. Part of the work-up has been completed, rest of still pending. So far there have been no significant abnormal laboratory test results (other than the elevated ferritin level). There is no evidence of a myeloproliferative disorder. Repeat ferritin level has been requested. Disease processes associated with elevated ferritin include inflammatory conditions, chronic kidney disease, RA/autoimmune disorders, acute infectious, malignancy, anemia of chronic disease, diabetes, metabolic syndrome, atherosclerosis, fatty liver, anorexia, Graves' disease, arrhythmias, chronic hepatitis C infection, Stills disease, hemophagocytic syndrome and sideroblastic anemia. Present available laboratory parameters argue against hemophagocytic syndrome and sideroblastic anemia. Patient has had a number of symptoms associated with Stills disease (sore throat, achy and swollen joints, muscle pain, elevated ferritin level). I believe patient has also had night sweats but has not specifically checked his temperature at home. As discussed previously, the elevated H/H values and the ferritin level may be secondary/reactive. Patient is to be seen via virtual visit in 2 months when all of the laboratory test results are available.    Juliann knows to call the hematology/oncology office if there are any other questions or concerns. Carefully review your medication list and verify that the list is accurate and up-to-date. Please call the hematology/oncology office if there are medications missing from the list, medications on the list that you are not currently taking or if there is a dosage or instruction that is different from how you're taking that medication. Patient goals and areas of care: Work-up the ferritin level and elevated H/H levels  Barriers to care: None  Patient is able to self-care  ______________________________________________________________________________________    Chief Complaint   Patient presents with    Follow-up    Elevated ferritin level     History of Present Illness: 70-year-old male with history of hypertension, chronic throat pain, dyslipidemia, progressive body aches and upper extremity pain/weakness (seen by rheumatology) referred for evaluation of an elevated ferritin level. Patient also has elevated H/H levels. Mr. Renata Ospina states feeling okay, better than before, closer to baseline. Throat pain is much less/better than before, patient is swallowing well. Appetite is otherwise good. Weight is stable. No fevers or signs of infection. No bruising or bleeding issues. No pain control issues. Activities are close to normal.    Review of Systems   Constitutional:  Positive for fatigue. HENT:  Negative for sore throat. Eyes: Negative. Respiratory: Negative. Cardiovascular: Negative. Gastrointestinal: Negative. Endocrine: Negative. Genitourinary: Negative. Musculoskeletal:  Positive for arthralgias. Skin: Negative. Allergic/Immunologic: Negative. Neurological:  Negative for weakness. Hematological: Negative. Psychiatric/Behavioral: Negative. All other systems reviewed and are negative.     Patient Active Problem List   Diagnosis    Otalgia of left ear Chronic throat pain    Chronic neck pain    Dyslipidemia    Vitamin D deficiency    Gastroesophageal reflux disease without esophagitis    Primary hypertension    High serum ferritin    Elevated hemoglobin (HCC)     Past Medical History:   Diagnosis Date    Chest pain     Headache(784.0)     High blood pressure     High cholesterol     Hypertension     Increased storage iron     Migraine      Past Surgical History:   Procedure Laterality Date    COLONOSCOPY      NO PAST SURGERIES      HI TONSILLECTOMY & ADENOIDECTOMY AGE 11/> N/A 9/21/2023    Procedure: TONSILLECTOMY;  Surgeon: Denise Gaytan MD;  Location: [facility] (formerly [facility]);  Service: ENT     Family History   Problem Relation Age of Onset    Cancer Mother         Breast cancer    Cancer Father      Social History     Socioeconomic History    Marital status: Single     Spouse name: Not on file    Number of children: Not on file    Years of education: Not on file    Highest education level: Not on file   Occupational History    Not on file   Tobacco Use    Smoking status: Never     Passive exposure: Never    Smokeless tobacco: Never    Tobacco comments:     Occasionally   Vaping Use    Vaping Use: Never used   Substance and Sexual Activity    Alcohol use: Yes     Comment: Occasionally    Drug use: Never    Sexual activity: Not Currently     Birth control/protection: None   Other Topics Concern    Not on file   Social History Narrative    Not on file     Social Determinants of Health     Financial Resource Strain: Low Risk  (4/21/2023)    Overall Financial Resource Strain (CARDIA)     Difficulty of Paying Living Expenses: Not hard at all   Food Insecurity: Not on file   Transportation Needs: No Transportation Needs (4/21/2023)    PRAPARE - Transportation     Lack of Transportation (Medical): No     Lack of Transportation (Non-Medical):  No   Physical Activity: Not on file   Stress: Not on file   Social Connections: Not on file   Intimate Partner Violence: Not on file Housing Stability: Not on file   Social history: Patient discontinued tobacco use many years ago, no drug or alcohol abuse, patient works as a , limited exposure to gasoline, metal dusts etc    Current Outpatient Medications:     amLODIPine (NORVASC) 10 mg tablet, Take 1 tablet by mouth daily, Disp: , Rfl:     ergocalciferol (VITAMIN D2) 50,000 units, take 1 capsule by mouth every week with food, Disp: , Rfl:     fenofibrate (TRICOR) 145 mg tablet, Take 145 mg by mouth daily, Disp: , Rfl:     lisinopril (ZESTRIL) 10 mg tablet, Take 1 tablet by mouth daily, Disp: , Rfl:     oxyCODONE-acetaminophen (Percocet) 5-325 mg per tablet, Take 1 tablet by mouth every 4 (four) hours as needed for severe pain Max Daily Amount: 6 tablets, Disp: 15 tablet, Rfl: 0    Allergies   Allergen Reactions    Penicillins Hives     Reaction Date: [id number]; Shellfish-Derived Products - Food Allergy Itching       Vitals:    11/08/23 0940   BP: 156/88   Pulse: 102   Resp: 17   SpO2: 97%     Physical Exam  Constitutional:       Appearance: He is well-developed. HENT:      Head: Normocephalic and atraumatic. Right Ear: External ear normal.      Left Ear: External ear normal.      Mouth/Throat:      Comments: The posterior oropharynx is much closer to normal, the prior white patches are gone, mucosa is moist and pink, no ulcers, no active bleeding  Eyes:      Pupils: Pupils are equal, round, and reactive to light. Cardiovascular:      Rate and Rhythm: Normal rate and regular rhythm. Heart sounds: Normal heart sounds. Pulmonary:      Effort: Pulmonary effort is normal.      Breath sounds: Normal breath sounds. Comments: Clear bilaterally  Abdominal:      General: Bowel sounds are normal.      Palpations: Abdomen is soft. Musculoskeletal:         General: Normal range of motion. Cervical back: Normal range of motion and neck supple. Skin:     General: Skin is warm.       Comments: Good color, warm, moist, no petechiae or ecchymoses   Neurological:      Mental Status: He is alert and oriented to person, place, and time. Deep Tendon Reflexes: Reflexes are normal and symmetric. Psychiatric:         Behavior: Behavior normal.         Thought Content: Thought content normal.         Judgment: Judgment normal.     Extremities: no lower extreme edema bilaterally, no cords, pulses are 1+   lymphatics: No adenopathy in the neck, supraclavicular region, axilla and groin bilaterally    Labs    10/16/2023 CALR mutation not detected    10/16/2023 thalassemia and hemoglobinopathy panel did not demonstrate evidence of a variant hemoglobin, beta thalassemia or iron deficiency. Alpha thalassemia cannot be totally excluded although a serious mutation was unlikely. 10/16/2023 hepatitis panel was negative for A, B and C    10/16/2023 [facility] peripheral blood flow cytometry did not demonstrate any evidence of circulating blasts or abnormal lymphoid or myeloid population. 10/16/2023 [facility] BCR-ABL FISH without evidence of rearrangement    JAK2 mutation analysis, MPL still pending        8//2023 iron = 86    7/6/2023 ESR = 21 ferritin = 939    4/21/2023 BUN = 15 creatinine = 1.05 calcium = 9.4 AST = 23 ALT = 52 alkaline phosphatase = 95 total protein = 8.3 albumin = 4.4 total bilirubin = 0.47    Imaging    8/19/2023 MRI lumbar spine without contrast    IMPRESSION:     The lumbar vertebral body heights are maintained demonstrating no acute fracture or subluxation. Mild degenerative disc disease at the L2-3 and L5-S1 levels without central canal narrowing.

## 2023-11-13 LAB — HFE GENE MUT ANL BLD/T: NORMAL

## 2023-11-16 LAB — JAK2 P.V617F BLD/T QL: NEGATIVE

## 2023-11-30 ENCOUNTER — OFFICE VISIT (OUTPATIENT)
Dept: OTOLARYNGOLOGY | Facility: CLINIC | Age: 43
End: 2023-11-30

## 2023-11-30 VITALS — HEIGHT: 68 IN | TEMPERATURE: 97.4 F | BODY MASS INDEX: 33.34 KG/M2 | WEIGHT: 220 LBS

## 2023-11-30 DIAGNOSIS — H92.02 OTALGIA OF LEFT EAR: ICD-10-CM

## 2023-11-30 DIAGNOSIS — Z90.89 S/P TONSILLECTOMY: Primary | ICD-10-CM

## 2023-11-30 DIAGNOSIS — R07.0 CHRONIC THROAT PAIN: ICD-10-CM

## 2023-11-30 DIAGNOSIS — G89.29 CHRONIC THROAT PAIN: ICD-10-CM

## 2023-11-30 PROCEDURE — 99024 POSTOP FOLLOW-UP VISIT: CPT | Performed by: OTOLARYNGOLOGY

## 2023-11-30 NOTE — PROGRESS NOTES
Assessment/Plan:  Some improvement in previous chronic pain and radiation to left ear. Pt now c/o pain on the left side of his uvula. Surgery has healed well. F/u in 3 months PRN. Diagnosis ICD-10-CM Associated Orders   1. S/P tonsillectomy  Z90.89       2. Chronic throat pain  R07.0     G89.29       3. Otalgia of left ear  H92.02              Subjective:      Patient ID: Lo Ramon is a 39 y.o. male. F/u s/p T&A. His previous chronic pain on the left side of his throat radiating to this left ear has improved, but his throat is still sore, and he has some sharp pains on the left side of his uvula when he swallows. The following portions of the patient's history were reviewed and updated as appropriate: allergies, current medications, past family history, past medical history, past social history, past surgical history and problem list.    Review of Systems      Objective:      Temp (!) 97.4 °F (36.3 °C) (Temporal)   Ht 5' 8" (1.727 m)   Wt 99.8 kg (220 lb)   BMI 33.45 kg/m²          Physical Exam  Constitutional:       Appearance: He is well-developed. HENT:      Head: Normocephalic and atraumatic. Right Ear: Tympanic membrane, ear canal and external ear normal. No drainage. No middle ear effusion. Left Ear: Tympanic membrane, ear canal and external ear normal. No drainage. No middle ear effusion. Nose: Nose normal.      Mouth/Throat:      Pharynx: Uvula midline. No oropharyngeal exudate. Tonsils: 0 on the right. 0 on the left. Neck:      Thyroid: No thyroid mass or thyromegaly. Trachea: Trachea normal. No tracheal deviation. Cardiovascular:      Heart sounds: Normal heart sounds, S1 normal and S2 normal.   Pulmonary:      Breath sounds: Normal breath sounds and air entry. Abdominal:      General: Bowel sounds are normal.      Palpations: Abdomen is soft. Lymphadenopathy:      Cervical: No cervical adenopathy.    Neurological:      Mental Status: He is alert.

## 2023-12-06 ENCOUNTER — TELEPHONE (OUTPATIENT)
Dept: NEUROLOGY | Facility: CLINIC | Age: 43
End: 2023-12-06

## 2023-12-20 ENCOUNTER — OFFICE VISIT (OUTPATIENT)
Dept: NEUROLOGY | Facility: CLINIC | Age: 43
End: 2023-12-20
Payer: MEDICARE

## 2023-12-20 VITALS
SYSTOLIC BLOOD PRESSURE: 150 MMHG | HEART RATE: 89 BPM | HEIGHT: 68 IN | BODY MASS INDEX: 34.86 KG/M2 | DIASTOLIC BLOOD PRESSURE: 80 MMHG | WEIGHT: 230 LBS

## 2023-12-20 DIAGNOSIS — G89.29 CHRONIC HEADACHES: Primary | ICD-10-CM

## 2023-12-20 DIAGNOSIS — R51.9 CHRONIC HEADACHES: Primary | ICD-10-CM

## 2023-12-20 DIAGNOSIS — R79.89 ELEVATED FERRITIN LEVEL: ICD-10-CM

## 2023-12-20 DIAGNOSIS — R90.89 ABNORMAL FINDING ON MRI OF BRAIN: ICD-10-CM

## 2023-12-20 DIAGNOSIS — M51.9 LUMBAR DISC DISEASE: ICD-10-CM

## 2023-12-20 PROCEDURE — 99214 OFFICE O/P EST MOD 30 MIN: CPT | Performed by: PSYCHIATRY & NEUROLOGY

## 2023-12-20 NOTE — PROGRESS NOTES
Return NeuroOutpatient Note        Jitendra Silva  [id number]  43 y.o.  1980       Follow-up (*Elevated ferritin level/*Chronic nonintractable headache, unspecified headache type/*Glossopharyngeal neuralgia/*Lumbar disc disease /)        History obtained from:  Patient and mother     HPI/Subjective:    Jitendra Silva is a 42 yo M with PMH Lyme disease, headaches presents as f/u. Per my previously history, patient had reported  cluster of symptoms including headaches, body aches, pain, sob, flutter in heart. He had comprehensive rheumatological work up and it was negative except for very high ferritin levels and high H/H. We felt some of his sxs were related to this underlying condition but heme/onc didn't feel that. They will repeat labs by next month and have f/u tele visit with them.   Patient has seen ENT for stabbing left side of head and throat pain. He describes this as inner tonsil pain. He was dx with glossopharyngeal neuralgia. MRI brain was ordered which revealed small vein contacts superomedial aspect of right trigeminal nerve cisternal segment. Small vein contacts lateral surface of left trigeminal nerve cisternal segment. No pathology was found for glossopharyngeal nerve or pterygopalatine fossa. He was sent to neurosurgeon but they said to first see neurology. ENT had tried tegretol for him and he took it for 4 weeks and he found no benefit from it. He did have tonsillectomy since last visit. Patient denies facial pain. His main complaint now is constant head pressure. He doesn't sleep well. Denies snoring or apneic events.   Overall, his sxs are better compared to last visit.      Patient also reports daily, constant neck and lower back pain. Denies radicular pain.   He has severe DD in 2019 and was asked to consider surgery then. We don't have his prior MRI available for review.       He's had MRI brain seizure protocol in 2019 and it was normal. He says he's had seizures in past but it's  not clear whether they were epileptic.       Past Medical History:   Diagnosis Date   • Chest pain    • Headache(784.0)    • High blood pressure    • High cholesterol    • Hypertension    • Increased storage iron    • Migraine      Social History     Socioeconomic History   • Marital status: Single     Spouse name: Not on file   • Number of children: Not on file   • Years of education: Not on file   • Highest education level: Not on file   Occupational History   • Not on file   Tobacco Use   • Smoking status: Never     Passive exposure: Never   • Smokeless tobacco: Never   • Tobacco comments:     Occasionally   Vaping Use   • Vaping status: Never Used   Substance and Sexual Activity   • Alcohol use: Yes     Comment: Occasionally   • Drug use: Never   • Sexual activity: Not Currently     Birth control/protection: None   Other Topics Concern   • Not on file   Social History Narrative   • Not on file     Social Determinants of Health     Financial Resource Strain: Low Risk  (4/21/2023)    Overall Financial Resource Strain (CARDIA)    • Difficulty of Paying Living Expenses: Not hard at all   Food Insecurity: Not on file   Transportation Needs: No Transportation Needs (4/21/2023)    PRAPARE - Transportation    • Lack of Transportation (Medical): No    • Lack of Transportation (Non-Medical): No   Physical Activity: Not on file   Stress: Not on file   Social Connections: Not on file   Intimate Partner Violence: Not on file   Housing Stability: Not on file     Family History   Problem Relation Age of Onset   • Cancer Mother         Breast cancer   • Cancer Father      Allergies   Allergen Reactions   • Penicillins Hives     Reaction Date: 11Feb2005;      • Shellfish-Derived Products - Food Allergy Itching     Current Outpatient Medications on File Prior to Visit   Medication Sig Dispense Refill   • amLODIPine (NORVASC) 10 mg tablet Take 1 tablet by mouth daily     • ergocalciferol (VITAMIN D2) 50,000 units take 1 capsule by  "mouth every week with food     • fenofibrate (TRICOR) 145 mg tablet Take 145 mg by mouth daily     • lisinopril (ZESTRIL) 10 mg tablet Take 1 tablet by mouth daily     • oxyCODONE-acetaminophen (Percocet) 5-325 mg per tablet Take 1 tablet by mouth every 4 (four) hours as needed for severe pain Max Daily Amount: 6 tablets (Patient not taking: Reported on 12/20/2023) 15 tablet 0     No current facility-administered medications on file prior to visit.         Review of Systems   Refer to positive review of systems in HPI.   Review of Systems    Constitutional- No fever  Eyes- No visual change  ENT- Hearing normal  CV- No chest pain  Resp- No Shortness of breath  GI- No diarrhea  - Bladder normal  MS- No Arthritis   Skin- No rash  Psych- No depression  Endo- No DM  Heme- No nodes    Vitals:    12/20/23 0954   BP: 150/80   BP Location: Left arm   Patient Position: Sitting   Cuff Size: Large   Pulse: 89   Weight: 104 kg (230 lb)   Height: 5' 8\" (1.727 m)       PHYSICAL EXAM:  Appearance: No Acute Distress  Ophthalmoscopic: Disc Flat, Normal fundus  Mental status:  Orientation: Awake, Alert, and Orientedx3  Memory: Registation 3/3 Recall 3/3  Attention: normal  Knowledge: good  Language: No aphasia  Speech: No dysarthria  Cranial Nerves:  2 No Visual Defect on Confrontation, Pupils round, equal, reactive to light  3,4,6 Extraocular Movements Intact, no nystagmus  5 Facial Sensation Intact  7 No facial asymmetry  8 Intact hearing  9,10 Palate symmetric, normal gag  11 Good shoulder shrug  12 Tongue Midline  Gait: Stable  Coordination: No ataxia with finger to nose testing, and heel to shin  Sensory: Intact, Symmetric to pinprick, light touch, vibration, and joint position  Muscle Tone: Normal              Muscle exam:  Arm Right Left Leg Right Left   Deltoid 5/5 5/5 Iliopsoas 5/5 5/5   Biceps 5/5 5/5 Quads 5/5 5/5   Triceps 5/5 5/5 Hamstrings 5/5 5/5   Wrist Extension 5/5 5/5 Ankle Dorsi Flexion 5/5 5/5   Wrist Flexion " 5/5 5/5 Ankle Plantar Flexion 5/5 5/5   Interossei 5/5 5/5 Ankle Eversion 5/5 5/5   APB 5/5 5/5 Ankle Inversion 5/5 5/5       Reflexes   RJ BJ TJ KJ AJ Plantars Hay's   Right 2+ 2+ 2+ 2+  Downgoing Not present   Left 2+ 2+ 2+ 2+  Downgoing Not present     Personal review of  Labs:                    Diagnoses and all orders for this visit:      1. Chronic headaches        2. Lumbar disc disease        3. Abnormal finding on MRI of brain        4. Elevated ferritin level            Patient currently has milder degree of similar sxs from last visit.   We offered preventive options for headaches but he's not interested at this time.  He will f/u with heme/onc and then f/u with us.               Total time of encounter:  30 min  More than 50% of the time was used in counseling and/or coordination of care  Extent of counseling and/or coordination of care        Rasheed Nicholson MD  [facility]  [address] [place] [address] [id number] [phone]

## 2023-12-20 NOTE — PATIENT INSTRUCTIONS
We discussed option of elavil (amitriptyline) or topamax for your daily headaches. You are not sure so if you decide in future, can notify us if you want to try it.

## 2023-12-22 PROBLEM — D45 POLYCYTHEMIA VERA (HCC): Status: ACTIVE | Noted: 2023-12-22

## 2024-01-05 ENCOUNTER — APPOINTMENT (OUTPATIENT)
Dept: LAB | Facility: CLINIC | Age: 44
End: 2024-01-05
Payer: MEDICARE

## 2024-01-05 DIAGNOSIS — R79.89 HIGH SERUM FERRITIN: ICD-10-CM

## 2024-01-05 LAB
BASOPHILS # BLD AUTO: 0.03 THOUSANDS/ÂΜL (ref 0–0.1)
BASOPHILS NFR BLD AUTO: 0 % (ref 0–1)
EOSINOPHIL # BLD AUTO: 0.18 THOUSAND/ÂΜL (ref 0–0.61)
EOSINOPHIL NFR BLD AUTO: 2 % (ref 0–6)
ERYTHROCYTE [DISTWIDTH] IN BLOOD BY AUTOMATED COUNT: 12.2 % (ref 11.6–15.1)
FERRITIN SERPL-MCNC: 254 NG/ML (ref 24–336)
HCT VFR BLD AUTO: 53.8 % (ref 36.5–49.3)
HGB BLD-MCNC: 18 G/DL (ref 12–17)
IMM GRANULOCYTES # BLD AUTO: 0.08 THOUSAND/UL (ref 0–0.2)
IMM GRANULOCYTES NFR BLD AUTO: 1 % (ref 0–2)
IRON SATN MFR SERPL: 54 % (ref 15–50)
IRON SERPL-MCNC: 208 UG/DL (ref 50–212)
LYMPHOCYTES # BLD AUTO: 2.06 THOUSANDS/ÂΜL (ref 0.6–4.47)
LYMPHOCYTES NFR BLD AUTO: 26 % (ref 14–44)
MCH RBC QN AUTO: 29.2 PG (ref 26.8–34.3)
MCHC RBC AUTO-ENTMCNC: 33.5 G/DL (ref 31.4–37.4)
MCV RBC AUTO: 87 FL (ref 82–98)
MONOCYTES # BLD AUTO: 0.7 THOUSAND/ÂΜL (ref 0.17–1.22)
MONOCYTES NFR BLD AUTO: 9 % (ref 4–12)
NEUTROPHILS # BLD AUTO: 5.02 THOUSANDS/ÂΜL (ref 1.85–7.62)
NEUTS SEG NFR BLD AUTO: 62 % (ref 43–75)
NRBC BLD AUTO-RTO: 0 /100 WBCS
PLATELET # BLD AUTO: 223 THOUSANDS/UL (ref 149–390)
PMV BLD AUTO: 9.7 FL (ref 8.9–12.7)
RBC # BLD AUTO: 6.16 MILLION/UL (ref 3.88–5.62)
TIBC SERPL-MCNC: 387 UG/DL (ref 250–450)
UIBC SERPL-MCNC: 179 UG/DL (ref 155–355)
WBC # BLD AUTO: 8.07 THOUSAND/UL (ref 4.31–10.16)

## 2024-01-05 PROCEDURE — 83550 IRON BINDING TEST: CPT

## 2024-01-05 PROCEDURE — 83540 ASSAY OF IRON: CPT

## 2024-01-05 PROCEDURE — 82728 ASSAY OF FERRITIN: CPT

## 2024-01-05 PROCEDURE — 36415 COLL VENOUS BLD VENIPUNCTURE: CPT

## 2024-01-05 PROCEDURE — 85025 COMPLETE CBC W/AUTO DIFF WBC: CPT

## 2024-01-17 ENCOUNTER — TELEMEDICINE (OUTPATIENT)
Dept: HEMATOLOGY ONCOLOGY | Facility: MEDICAL CENTER | Age: 44
End: 2024-01-17
Payer: MEDICARE

## 2024-01-17 DIAGNOSIS — R79.89 HIGH SERUM FERRITIN: Primary | ICD-10-CM

## 2024-01-17 PROCEDURE — 99442 PR PHYS/QHP TELEPHONE EVALUATION 11-20 MIN: CPT | Performed by: INTERNAL MEDICINE

## 2024-01-17 NOTE — PROGRESS NOTES
Virtual Brief Visit    This Visit is being completed by telephone. The Patient is located at Home and in the following state in which I hold an active license [place]    The patient was identified by name and date of birth. Jitendra Silva was informed that this is a telemedicine visit and that the visit is being conducted through Telephone.  My office door was closed. No one else was in the room.  He acknowledged consent and understanding of privacy and security of the video platform. The patient has agreed to participate and understands they can discontinue the visit at any time.    Patient is aware this is a billable service.     Assessment/Plan:    43-year-old male previously referred for an elevated ferritin level.  H/H levels have also been elevated.  Mr. Humphreys feels well, baseline.  Etiology for the persistent but improving throat pain is not clear.  Patient will follow-up with ENT as directed.    Repeat laboratory test results are listed below.  There was no evidence of a primary bone marrow disorder.  Additionally, the ferritin level previously of approximately 900 has come down to 254, WNL.  No additional hematology workup needed for this.    The H/H levels are still elevated, not terribly so.  No additional hematology workup is needed for this other than periodic monitoring.  From hematology standpoint, Mr. Humphreys can donate blood approximately twice a year.  Patient will find a blood center along with a fax number.  When he has been scheduled for his phlebotomy donation, this office will send a copy of this note to the blood center.  From a hematology standpoint, patient is cleared to donate blood with the elevated H/H levels.  There is no evidence of a primary bone marrow disorder.    Patient will make sure that routine health maintenance and medical care is up-to-date with Lauryn Koroma.  Return to hematology is on an as-needed basis but Mr. Humphreys knows to call if he has any hematology  questions or concerns.    Carefully review your medication list and verify that the list is accurate and up-to-date. Please call the hematology/oncology office if there are medications missing from the list, medications on the list that you are not currently taking or if there is a dosage or instruction that is different from how you're taking that medication.     Patient goals and areas of care: Follow-up with PCP  Barriers to care: None  Patient is able to self-care    Problem List Items Addressed This Visit    None    Recent Visits  No visits were found meeting these conditions.  Showing recent visits within past 7 days and meeting all other requirements  Future Appointments  No visits were found meeting these conditions.  Showing future appointments within next 150 days and meeting all other requirements     HPI: 43-year-old male with history of hypertension, chronic throat pain (status post tonsillectomy) dyslipidemia, body aches previously referred to hematology for evaluation of an elevated ferritin level.  Patient was seen via televisit, telephone today.    Presently Mr. Humphreys states feeling more or less okay, about the same as before.  Still with residual throat pain but patient can otherwise swallow okay.  No chest pain or regurgitation during swallowing.  Appetite is good, weight is stable.  No fevers or signs of infection.  No headaches, blurred vision or dizziness.  No GI or  problems.  Activities are baseline.    Laboratory    1/5/2024 iron = 208 iron saturation = 54% TIBC = 387 ferritin = 254    1/5/2024 WBC = 8.07 hemoglobin = 18 hematocrit = 54 MCV = 87 platelet = 223 neutrophil = 62% bands = 1% lymphocyte = 26% monocyte = 9% eosinophil = 2%    10/16/2023 hepatitis B surface antigen nonreactive, hepatitis A IgM nonreactive, hepatitis C antibody nonreactive, hepatitis B IgM nonreactive    8//2023 iron = 86     7/6/2023 ESR = 21 ferritin = 939     4/21/2023 BUN = 15 creatinine = 1.05 calcium =  9.4 AST = 23 ALT = 52 alkaline phosphatase = 95 total protein = 8.3 albumin = 4.4 total bilirubin = 0.47    Pathology        11/9/2023 JAK2 V6 17F mutation negative, E12-15 negative, MPL mutation negative, CALR mutation negative    10/16/2023 [facility] peripheral blood flow cytometry analysis did not show significant numbers of circulating blasts or an abnormal lymphoid or myeloid population    10/16/2023 [facility] BCR/abl rearrangement not detected    Visit Time  Total Visit Duration: 15 minutes

## 2024-02-17 ENCOUNTER — HOSPITAL ENCOUNTER (INPATIENT)
Facility: HOSPITAL | Age: 44
LOS: 3 days | Discharge: HOME | DRG: 854 | End: 2024-02-21
Attending: EMERGENCY MEDICINE | Admitting: STUDENT IN AN ORGANIZED HEALTH CARE EDUCATION/TRAINING PROGRAM
Payer: MEDICARE

## 2024-02-17 ENCOUNTER — APPOINTMENT (EMERGENCY)
Dept: RADIOLOGY | Facility: HOSPITAL | Age: 44
DRG: 854 | End: 2024-02-17
Payer: MEDICARE

## 2024-02-17 DIAGNOSIS — N20.0 KIDNEY STONE: ICD-10-CM

## 2024-02-17 DIAGNOSIS — N20.1 CALCULUS OF URETEROVESICAL JUNCTION (UVJ): ICD-10-CM

## 2024-02-17 DIAGNOSIS — N20.0 LEFT NEPHROLITHIASIS: Primary | ICD-10-CM

## 2024-02-17 DIAGNOSIS — R52 INTRACTABLE PAIN: ICD-10-CM

## 2024-02-17 PROBLEM — A41.9 SEPSIS (HCC): Status: ACTIVE | Noted: 2024-02-17

## 2024-02-17 LAB
ALBUMIN SERPL BCP-MCNC: 4.5 G/DL (ref 3.5–5)
ALP SERPL-CCNC: 121 U/L (ref 34–104)
ALT SERPL W P-5'-P-CCNC: 92 U/L (ref 7–52)
ANION GAP SERPL CALCULATED.3IONS-SCNC: 11 MMOL/L
AST SERPL W P-5'-P-CCNC: 42 U/L (ref 13–39)
BACTERIA UR QL AUTO: NORMAL /HPF
BASOPHILS # BLD AUTO: 0.05 THOUSANDS/ÂΜL (ref 0–0.1)
BASOPHILS NFR BLD AUTO: 0 % (ref 0–1)
BILIRUB SERPL-MCNC: 0.7 MG/DL (ref 0.2–1)
BILIRUB UR QL STRIP: NEGATIVE
BUN SERPL-MCNC: 15 MG/DL (ref 5–25)
CALCIUM SERPL-MCNC: 8.9 MG/DL (ref 8.4–10.2)
CHLORIDE SERPL-SCNC: 102 MMOL/L (ref 96–108)
CLARITY UR: CLEAR
CO2 SERPL-SCNC: 23 MMOL/L (ref 21–32)
COLOR UR: YELLOW
CREAT SERPL-MCNC: 1.23 MG/DL (ref 0.6–1.3)
EOSINOPHIL # BLD AUTO: 0.01 THOUSAND/ÂΜL (ref 0–0.61)
EOSINOPHIL NFR BLD AUTO: 0 % (ref 0–6)
ERYTHROCYTE [DISTWIDTH] IN BLOOD BY AUTOMATED COUNT: 11.9 % (ref 11.6–15.1)
FLUAV RNA RESP QL NAA+PROBE: NEGATIVE
FLUBV RNA RESP QL NAA+PROBE: NEGATIVE
GFR SERPL CREATININE-BSD FRML MDRD: 70 ML/MIN/1.73SQ M
GLUCOSE SERPL-MCNC: 114 MG/DL (ref 65–140)
GLUCOSE UR STRIP-MCNC: NEGATIVE MG/DL
HCT VFR BLD AUTO: 51.2 % (ref 36.5–49.3)
HGB BLD-MCNC: 17.7 G/DL (ref 12–17)
HGB UR QL STRIP.AUTO: ABNORMAL
IMM GRANULOCYTES # BLD AUTO: 0.15 THOUSAND/UL (ref 0–0.2)
IMM GRANULOCYTES NFR BLD AUTO: 1 % (ref 0–2)
KETONES UR STRIP-MCNC: NEGATIVE MG/DL
LACTATE SERPL-SCNC: 1.9 MMOL/L (ref 0.5–2)
LEUKOCYTE ESTERASE UR QL STRIP: NEGATIVE
LIPASE SERPL-CCNC: 8 U/L (ref 11–82)
LYMPHOCYTES # BLD AUTO: 1.44 THOUSANDS/ÂΜL (ref 0.6–4.47)
LYMPHOCYTES NFR BLD AUTO: 8 % (ref 14–44)
MAGNESIUM SERPL-MCNC: 1.8 MG/DL (ref 1.9–2.7)
MCH RBC QN AUTO: 29.9 PG (ref 26.8–34.3)
MCHC RBC AUTO-ENTMCNC: 34.6 G/DL (ref 31.4–37.4)
MCV RBC AUTO: 87 FL (ref 82–98)
MONOCYTES # BLD AUTO: 1.07 THOUSAND/ÂΜL (ref 0.17–1.22)
MONOCYTES NFR BLD AUTO: 6 % (ref 4–12)
NEUTROPHILS # BLD AUTO: 14.61 THOUSANDS/ÂΜL (ref 1.85–7.62)
NEUTS SEG NFR BLD AUTO: 85 % (ref 43–75)
NITRITE UR QL STRIP: NEGATIVE
NON-SQ EPI CELLS URNS QL MICRO: NORMAL /HPF
NRBC BLD AUTO-RTO: 0 /100 WBCS
PH UR STRIP.AUTO: 7 [PH]
PLATELET # BLD AUTO: 217 THOUSANDS/UL (ref 149–390)
PMV BLD AUTO: 9.8 FL (ref 8.9–12.7)
POTASSIUM SERPL-SCNC: 4.4 MMOL/L (ref 3.5–5.3)
PROT SERPL-MCNC: 7.7 G/DL (ref 6.4–8.4)
PROT UR STRIP-MCNC: ABNORMAL MG/DL
RBC # BLD AUTO: 5.92 MILLION/UL (ref 3.88–5.62)
RBC #/AREA URNS AUTO: NORMAL /HPF
RSV RNA RESP QL NAA+PROBE: NEGATIVE
SARS-COV-2 RNA RESP QL NAA+PROBE: NEGATIVE
SODIUM SERPL-SCNC: 136 MMOL/L (ref 135–147)
SP GR UR STRIP.AUTO: 1.01 (ref 1–1.03)
UROBILINOGEN UR QL STRIP.AUTO: 0.2 E.U./DL
WBC # BLD AUTO: 17.33 THOUSAND/UL (ref 4.31–10.16)
WBC #/AREA URNS AUTO: NORMAL /HPF

## 2024-02-17 PROCEDURE — 87086 URINE CULTURE/COLONY COUNT: CPT

## 2024-02-17 PROCEDURE — 99223 1ST HOSP IP/OBS HIGH 75: CPT | Performed by: STUDENT IN AN ORGANIZED HEALTH CARE EDUCATION/TRAINING PROGRAM

## 2024-02-17 PROCEDURE — 81001 URINALYSIS AUTO W/SCOPE: CPT | Performed by: EMERGENCY MEDICINE

## 2024-02-17 PROCEDURE — 96361 HYDRATE IV INFUSION ADD-ON: CPT

## 2024-02-17 PROCEDURE — 83605 ASSAY OF LACTIC ACID: CPT | Performed by: EMERGENCY MEDICINE

## 2024-02-17 PROCEDURE — 0241U HB NFCT DS VIR RESP RNA 4 TRGT: CPT | Performed by: EMERGENCY MEDICINE

## 2024-02-17 PROCEDURE — 99285 EMERGENCY DEPT VISIT HI MDM: CPT

## 2024-02-17 PROCEDURE — 96375 TX/PRO/DX INJ NEW DRUG ADDON: CPT

## 2024-02-17 PROCEDURE — 74177 CT ABD & PELVIS W/CONTRAST: CPT

## 2024-02-17 PROCEDURE — 36415 COLL VENOUS BLD VENIPUNCTURE: CPT | Performed by: EMERGENCY MEDICINE

## 2024-02-17 PROCEDURE — 99285 EMERGENCY DEPT VISIT HI MDM: CPT | Performed by: EMERGENCY MEDICINE

## 2024-02-17 PROCEDURE — 83690 ASSAY OF LIPASE: CPT | Performed by: EMERGENCY MEDICINE

## 2024-02-17 PROCEDURE — 87077 CULTURE AEROBIC IDENTIFY: CPT | Performed by: EMERGENCY MEDICINE

## 2024-02-17 PROCEDURE — 85025 COMPLETE CBC W/AUTO DIFF WBC: CPT | Performed by: EMERGENCY MEDICINE

## 2024-02-17 PROCEDURE — 80053 COMPREHEN METABOLIC PANEL: CPT | Performed by: EMERGENCY MEDICINE

## 2024-02-17 PROCEDURE — 87154 CUL TYP ID BLD PTHGN 6+ TRGT: CPT | Performed by: EMERGENCY MEDICINE

## 2024-02-17 PROCEDURE — 96365 THER/PROPH/DIAG IV INF INIT: CPT

## 2024-02-17 PROCEDURE — 87186 SC STD MICRODIL/AGAR DIL: CPT | Performed by: EMERGENCY MEDICINE

## 2024-02-17 PROCEDURE — 87040 BLOOD CULTURE FOR BACTERIA: CPT | Performed by: EMERGENCY MEDICINE

## 2024-02-17 PROCEDURE — 83735 ASSAY OF MAGNESIUM: CPT | Performed by: EMERGENCY MEDICINE

## 2024-02-17 RX ORDER — ACETAMINOPHEN 325 MG/1
650 TABLET ORAL EVERY 6 HOURS PRN
Status: DISCONTINUED | OUTPATIENT
Start: 2024-02-17 | End: 2024-02-21 | Stop reason: HOSPADM

## 2024-02-17 RX ORDER — FENOFIBRATE 145 MG/1
145 TABLET, COATED ORAL DAILY
Status: DISCONTINUED | OUTPATIENT
Start: 2024-02-17 | End: 2024-02-21 | Stop reason: HOSPADM

## 2024-02-17 RX ORDER — POLYETHYLENE GLYCOL 3350 17 G/17G
17 POWDER, FOR SOLUTION ORAL DAILY
Status: DISCONTINUED | OUTPATIENT
Start: 2024-02-17 | End: 2024-02-21 | Stop reason: HOSPADM

## 2024-02-17 RX ORDER — OXYCODONE HYDROCHLORIDE 5 MG/1
5 TABLET ORAL EVERY 4 HOURS PRN
Status: DISCONTINUED | OUTPATIENT
Start: 2024-02-17 | End: 2024-02-21 | Stop reason: HOSPADM

## 2024-02-17 RX ORDER — HYDROMORPHONE HCL/PF 1 MG/ML
0.5 SYRINGE (ML) INJECTION
Status: DISCONTINUED | OUTPATIENT
Start: 2024-02-17 | End: 2024-02-21 | Stop reason: HOSPADM

## 2024-02-17 RX ORDER — CEFTRIAXONE 1 G/50ML
1000 INJECTION, SOLUTION INTRAVENOUS ONCE
Status: COMPLETED | OUTPATIENT
Start: 2024-02-17 | End: 2024-02-17

## 2024-02-17 RX ORDER — AMOXICILLIN 250 MG
1 CAPSULE ORAL
Status: DISCONTINUED | OUTPATIENT
Start: 2024-02-17 | End: 2024-02-19

## 2024-02-17 RX ORDER — ACETAMINOPHEN 10 MG/ML
1000 INJECTION, SOLUTION INTRAVENOUS ONCE
Status: COMPLETED | OUTPATIENT
Start: 2024-02-17 | End: 2024-02-17

## 2024-02-17 RX ORDER — CEFTRIAXONE 1 G/50ML
1000 INJECTION, SOLUTION INTRAVENOUS EVERY 24 HOURS
Status: DISCONTINUED | OUTPATIENT
Start: 2024-02-18 | End: 2024-02-17

## 2024-02-17 RX ORDER — TAMSULOSIN HYDROCHLORIDE 0.4 MG/1
0.4 CAPSULE ORAL ONCE
Status: COMPLETED | OUTPATIENT
Start: 2024-02-17 | End: 2024-02-17

## 2024-02-17 RX ORDER — OXYCODONE HYDROCHLORIDE 5 MG/1
5 TABLET ORAL EVERY 4 HOURS PRN
Status: DISCONTINUED | OUTPATIENT
Start: 2024-02-17 | End: 2024-02-17

## 2024-02-17 RX ORDER — HYDROMORPHONE HCL/PF 1 MG/ML
1 SYRINGE (ML) INJECTION EVERY 6 HOURS PRN
Status: DISCONTINUED | OUTPATIENT
Start: 2024-02-17 | End: 2024-02-21 | Stop reason: HOSPADM

## 2024-02-17 RX ORDER — HYDROMORPHONE HCL/PF 1 MG/ML
0.5 SYRINGE (ML) INJECTION
Status: DISCONTINUED | OUTPATIENT
Start: 2024-02-17 | End: 2024-02-17

## 2024-02-17 RX ORDER — LISINOPRIL 10 MG/1
10 TABLET ORAL DAILY
Status: DISCONTINUED | OUTPATIENT
Start: 2024-02-17 | End: 2024-02-21 | Stop reason: HOSPADM

## 2024-02-17 RX ORDER — HEPARIN SODIUM 5000 [USP'U]/ML
5000 INJECTION, SOLUTION INTRAVENOUS; SUBCUTANEOUS EVERY 8 HOURS SCHEDULED
Status: DISCONTINUED | OUTPATIENT
Start: 2024-02-17 | End: 2024-02-21 | Stop reason: HOSPADM

## 2024-02-17 RX ORDER — SODIUM CHLORIDE 9 MG/ML
75 INJECTION, SOLUTION INTRAVENOUS CONTINUOUS
Status: DISCONTINUED | OUTPATIENT
Start: 2024-02-17 | End: 2024-02-20

## 2024-02-17 RX ORDER — HYDROMORPHONE HCL/PF 1 MG/ML
0.5 SYRINGE (ML) INJECTION ONCE
Status: COMPLETED | OUTPATIENT
Start: 2024-02-17 | End: 2024-02-17

## 2024-02-17 RX ORDER — ONDANSETRON 2 MG/ML
4 INJECTION INTRAMUSCULAR; INTRAVENOUS ONCE
Status: COMPLETED | OUTPATIENT
Start: 2024-02-17 | End: 2024-02-17

## 2024-02-17 RX ORDER — AMLODIPINE BESYLATE 10 MG/1
10 TABLET ORAL DAILY
Status: DISCONTINUED | OUTPATIENT
Start: 2024-02-17 | End: 2024-02-21 | Stop reason: HOSPADM

## 2024-02-17 RX ORDER — HYDROMORPHONE HCL/PF 1 MG/ML
1 SYRINGE (ML) INJECTION ONCE
Status: COMPLETED | OUTPATIENT
Start: 2024-02-17 | End: 2024-02-17

## 2024-02-17 RX ORDER — ONDANSETRON 2 MG/ML
4 INJECTION INTRAMUSCULAR; INTRAVENOUS EVERY 6 HOURS PRN
Status: DISCONTINUED | OUTPATIENT
Start: 2024-02-17 | End: 2024-02-21 | Stop reason: HOSPADM

## 2024-02-17 RX ORDER — TAMSULOSIN HYDROCHLORIDE 0.4 MG/1
0.4 CAPSULE ORAL
Status: DISCONTINUED | OUTPATIENT
Start: 2024-02-18 | End: 2024-02-21 | Stop reason: HOSPADM

## 2024-02-17 RX ORDER — CEFTRIAXONE 2 G/50ML
2000 INJECTION, SOLUTION INTRAVENOUS EVERY 24 HOURS
Status: DISCONTINUED | OUTPATIENT
Start: 2024-02-18 | End: 2024-02-20

## 2024-02-17 RX ADMIN — IOHEXOL 100 ML: 350 INJECTION, SOLUTION INTRAVENOUS at 13:43

## 2024-02-17 RX ADMIN — ACETAMINOPHEN 1000 MG: 10 INJECTION INTRAVENOUS at 11:28

## 2024-02-17 RX ADMIN — ONDANSETRON 4 MG: 2 INJECTION INTRAMUSCULAR; INTRAVENOUS at 13:58

## 2024-02-17 RX ADMIN — HEPARIN SODIUM 5000 UNITS: 5000 INJECTION INTRAVENOUS; SUBCUTANEOUS at 18:55

## 2024-02-17 RX ADMIN — SODIUM CHLORIDE 125 ML/HR: 0.9 INJECTION, SOLUTION INTRAVENOUS at 18:56

## 2024-02-17 RX ADMIN — LISINOPRIL 10 MG: 10 TABLET ORAL at 18:54

## 2024-02-17 RX ADMIN — POLYETHYLENE GLYCOL 3350 17 G: 17 POWDER, FOR SOLUTION ORAL at 20:45

## 2024-02-17 RX ADMIN — OXYCODONE HYDROCHLORIDE 5 MG: 5 TABLET ORAL at 18:54

## 2024-02-17 RX ADMIN — HYDROMORPHONE HYDROCHLORIDE 1 MG: 1 INJECTION, SOLUTION INTRAMUSCULAR; INTRAVENOUS; SUBCUTANEOUS at 15:55

## 2024-02-17 RX ADMIN — SODIUM CHLORIDE 1000 ML: 0.9 INJECTION, SOLUTION INTRAVENOUS at 11:29

## 2024-02-17 RX ADMIN — AMLODIPINE BESYLATE 10 MG: 10 TABLET ORAL at 18:54

## 2024-02-17 RX ADMIN — CEFTRIAXONE 1000 MG: 1 INJECTION, SOLUTION INTRAVENOUS at 15:13

## 2024-02-17 RX ADMIN — HYDROMORPHONE HYDROCHLORIDE 0.5 MG: 1 INJECTION, SOLUTION INTRAMUSCULAR; INTRAVENOUS; SUBCUTANEOUS at 13:53

## 2024-02-17 RX ADMIN — FENOFIBRATE 145 MG: 145 TABLET, FILM COATED ORAL at 18:54

## 2024-02-17 RX ADMIN — TAMSULOSIN HYDROCHLORIDE 0.4 MG: 0.4 CAPSULE ORAL at 15:54

## 2024-02-17 RX ADMIN — SENNOSIDES AND DOCUSATE SODIUM 1 TABLET: 50; 8.6 TABLET ORAL at 22:54

## 2024-02-17 RX ADMIN — HYDROMORPHONE HYDROCHLORIDE 0.5 MG: 1 INJECTION, SOLUTION INTRAMUSCULAR; INTRAVENOUS; SUBCUTANEOUS at 20:39

## 2024-02-17 NOTE — H&P
[facility]  H&P  Name: Jitendra Silva 43 y.o. male I MRN: [id number]  Unit/Bed#: ED HW3 I Date of Admission: 2/17/2024   Date of Service: 2/17/2024 I Hospital Day: 0      Assessment/Plan   * Calculus of ureterovesical junction (UVJ)  Assessment & Plan  Patient presented to ED with left flank pain on and off past few days, worsening last night.  CT a/p: 2 mm left UVJ calculus causing mild hydroureteronephrosis. Asymmetric left perinephric edema, presumably reactive.  WBC 17.3  Cr appears at baseline  Continue IVF  Start flomax  Nausea and pain medication as needed  Strain all urine  Urology consulted  Npo at midnight    Sepsis (HCC)  Assessment & Plan  POA evidenced by tachycardia and leukocytosis in setting of kidney stone/UTI  CT a/p: 2 mm left UVJ calculus causing mild hydroureteronephrosis. Asymmetric left perinephric edema, presumably reactive.  Lactate negative  Blood cultures pending  Urine culture pending  Continue rocephin    Elevated hemoglobin (HCC)  Assessment & Plan  Follows with hematology and rheumatology  At baseline    Primary hypertension  Assessment & Plan  Continue amlodipine 10 mg daily and lisinopril 10 mg daily    Dyslipidemia  Assessment & Plan  Continue fenofibrate           VTE Pharmacologic Prophylaxis: VTE Score: 3 Moderate Risk (Score 3-4) - Pharmacological DVT Prophylaxis Ordered: heparin.  Code Status: Level 1 - Full Code   Discussion with family: Updated  (mother) at bedside.    Anticipated Length of Stay: Patient will be admitted on an observation basis with an anticipated length of stay of less than 2 midnights secondary to kidney stone.    Total Time Spent on Date of Encounter in care of patient: 45 mins. This time was spent on one or more of the following: performing physical exam; counseling and coordination of care; obtaining or reviewing history; documenting in the medical record; reviewing/ordering tests, medications or procedures;  communicating with other healthcare professionals and discussing with patient's family/caregivers.    Chief Complaint: left flank pain    History of Present Illness:  Jitendra Silva is a 43 y.o. male with a PMH of HTN, HLD who presents with left flank and back pain on and off for past few days but acutely worse overnight.  Denies any associated symptoms including fever, chills, chest pain, shortness of breath, nausea, vomiting, or diarrhea.    Review of Systems:  Review of Systems   Constitutional:  Negative for chills and fever.   HENT:  Negative for ear pain and sore throat.    Eyes:  Negative for pain and visual disturbance.   Respiratory:  Negative for cough and shortness of breath.    Cardiovascular:  Negative for chest pain and palpitations.   Gastrointestinal:  Positive for abdominal pain. Negative for vomiting.   Genitourinary:  Positive for flank pain. Negative for dysuria and hematuria.   Musculoskeletal:  Positive for back pain. Negative for arthralgias.   Skin:  Negative for color change and rash.   Neurological:  Negative for seizures and syncope.   All other systems reviewed and are negative.      Past Medical and Surgical History:   Past Medical History:   Diagnosis Date    Chest pain     Headache(784.0)     High blood pressure     High cholesterol     Hypertension     Increased storage iron     Migraine        Past Surgical History:   Procedure Laterality Date    COLONOSCOPY      NO PAST SURGERIES      DE TONSILLECTOMY & ADENOIDECTOMY AGE 11/> N/A 9/21/2023    Procedure: TONSILLECTOMY;  Surgeon: Tha Brown MD;  Location: [facility];  Service: ENT       Meds/Allergies:  Prior to Admission medications    Medication Sig Start Date End Date Taking? Authorizing Provider   amLODIPine (NORVASC) 10 mg tablet Take 1 tablet by mouth daily 2/14/23   Historical Provider, MD   ergocalciferol (VITAMIN D2) 50,000 units take 1 capsule by mouth every week with food 3/10/23   Historical Provider, MD  "  fenofibrate (TRICOR) 145 mg tablet Take 145 mg by mouth daily 11/6/23   Historical Provider, MD   lisinopril (ZESTRIL) 10 mg tablet Take 1 tablet by mouth daily 2/14/23   Historical Provider, MD   oxyCODONE-acetaminophen (Percocet) 5-325 mg per tablet Take 1 tablet by mouth every 4 (four) hours as needed for severe pain Max Daily Amount: 6 tablets  Patient not taking: Reported on 12/20/2023 9/26/23   Tha Brown MD     I have reviewed home medications with patient personally.    Allergies:   Allergies   Allergen Reactions    Penicillins Hives     Reaction Date: 11Feb2005;       Shellfish-Derived Products - Food Allergy Itching       Social History:  Marital Status: Single   Occupation:   Patient Pre-hospital Living Situation: Home  Patient Pre-hospital Level of Mobility: walks  Patient Pre-hospital Diet Restrictions: none  Substance Use History:   Social History     Substance and Sexual Activity   Alcohol Use Yes    Comment: Occasionally     Social History     Tobacco Use   Smoking Status Never    Passive exposure: Never   Smokeless Tobacco Never   Tobacco Comments    Occasionally     Social History     Substance and Sexual Activity   Drug Use Never       Family History:  Family History   Problem Relation Age of Onset    Cancer Mother         Breast cancer    Cancer Father        Physical Exam:     Vitals:   Blood Pressure: (!) 184/184 (02/17/24 1007)  Pulse: (!) 107 (02/17/24 1007)  Temperature: 98.3 °F (36.8 °C) (02/17/24 1007)  Temp Source: Temporal (02/17/24 1007)  Respirations: 16 (02/17/24 1007)  Height: 5' 8\" (172.7 cm) (02/17/24 1007)  Weight - Scale: 109 kg (241 lb) (02/17/24 1007)  SpO2: 95 % (02/17/24 1007)    Physical Exam  Vitals and nursing note reviewed.   Constitutional:       General: He is not in acute distress.     Appearance: He is well-developed.   Cardiovascular:      Rate and Rhythm: Normal rate and regular rhythm.      Heart sounds: No murmur heard.  Pulmonary:      Effort: " Pulmonary effort is normal. No respiratory distress.      Breath sounds: Normal breath sounds.   Abdominal:      Palpations: Abdomen is soft.      Tenderness: There is no abdominal tenderness.      Comments: Reports improvement in pain after pain meds in ED   Musculoskeletal:         General: No swelling.   Skin:     General: Skin is warm and dry.      Capillary Refill: Capillary refill takes less than 2 seconds.   Neurological:      Mental Status: He is alert.   Psychiatric:         Mood and Affect: Mood normal.          Additional Data:     Lab Results:  Results from last 7 days   Lab Units 02/17/24  1128   WBC Thousand/uL 17.33*   HEMOGLOBIN g/dL 17.7*   HEMATOCRIT % 51.2*   PLATELETS Thousands/uL 217   NEUTROS PCT % 85*   LYMPHS PCT % 8*   MONOS PCT % 6   EOS PCT % 0     Results from last 7 days   Lab Units 02/17/24  1128   SODIUM mmol/L 136   POTASSIUM mmol/L 4.4   CHLORIDE mmol/L 102   CO2 mmol/L 23   BUN mg/dL 15   CREATININE mg/dL 1.23   ANION GAP mmol/L 11   CALCIUM mg/dL 8.9   ALBUMIN g/dL 4.5   TOTAL BILIRUBIN mg/dL 0.70   ALK PHOS U/L 121*   ALT U/L 92*   AST U/L 42*   GLUCOSE RANDOM mg/dL 114                 Results from last 7 days   Lab Units 02/17/24  1501   LACTIC ACID mmol/L 1.9       Lines/Drains:  Invasive Devices       Peripheral Intravenous Line  Duration             Peripheral IV 02/17/24 Left Antecubital <1 day                        Imaging: Reviewed radiology reports from this admission including: abdominal/pelvic CT  CT abdomen pelvis with contrast   Final Result by Jeff Singletary DO (02/17 1426)      2 mm left UVJ calculus causing mild hydroureteronephrosis. Asymmetric left perinephric edema, presumably reactive.      Fatty liver.      Borderline prostatomegaly.      Additional incidental findings as above.      Workstation performed: [id number]             EKG and Other Studies Reviewed on Admission:   EKG: No EKG obtained.    ** Please Note: This note has been constructed using a  voice recognition system. **

## 2024-02-17 NOTE — ED NOTES
"Patient refusing covid swab. States he does not believe in them. States his doctor has \"told [him] bad things\" about it. DO made aware       Alysha Soria RN  02/17/24 9518    "

## 2024-02-17 NOTE — PLAN OF CARE
Problem: GENITOURINARY - ADULT  Goal: Maintains or returns to baseline urinary function  Description: INTERVENTIONS:  - Assess urinary function  - Encourage oral fluids to ensure adequate hydration if ordered  - Administer IV fluids as ordered to ensure adequate hydration  - Administer ordered medications as needed  - Offer frequent toileting  - Follow urinary retention protocol if ordered  Outcome: Progressing  Goal: Absence of urinary retention  Description: INTERVENTIONS:  - Assess patient’s ability to void and empty bladder  - Monitor I/O  - Bladder scan as needed  - Discuss with physician/AP medications to alleviate retention as needed  - Discuss catheterization for long term situations as appropriate  Outcome: Progressing  Goal: Urinary catheter remains patent  Description: INTERVENTIONS:  - Assess patency of urinary catheter  - If patient has a chronic plascencia, consider changing catheter if non-functioning  - Follow guidelines for intermittent irrigation of non-functioning urinary catheter  Outcome: Progressing

## 2024-02-17 NOTE — ED PROVIDER NOTES
History  Chief Complaint   Patient presents with    Abdominal Pain     Pt c/o onset of LLQ and left back pain overnight     43-year-old male with past history of hypercholesterolemia, migraine, hypertension, presents to the ED for evaluation of left-sided abdominal pain that started overnight.  Patient states that he gets intermittent episodes of severe, stabbing pain.  No similar episode of pain in the past.  Patient denies any fevers or chills.  Patient denies any nausea, vomiting, diarrhea, dysuria, or any increased urinary frequency.  Patient came to the ED for further evaluation as pain has been persistent.      Abdominal Pain  Associated symptoms: no chest pain, no chills, no cough, no dysuria, no fever, no hematuria, no shortness of breath, no sore throat and no vomiting        Prior to Admission Medications   Prescriptions Last Dose Informant Patient Reported? Taking?   amLODIPine (NORVASC) 10 mg tablet  Self Yes No   Sig: Take 1 tablet by mouth daily   ergocalciferol (VITAMIN D2) 50,000 units  Self Yes No   Sig: take 1 capsule by mouth every week with food   fenofibrate (TRICOR) 145 mg tablet   Yes No   Sig: Take 145 mg by mouth daily   lisinopril (ZESTRIL) 10 mg tablet  Self Yes No   Sig: Take 1 tablet by mouth daily   oxyCODONE-acetaminophen (Percocet) 5-325 mg per tablet   No No   Sig: Take 1 tablet by mouth every 4 (four) hours as needed for severe pain Max Daily Amount: 6 tablets   Patient not taking: Reported on 12/20/2023      Facility-Administered Medications: None       Past Medical History:   Diagnosis Date    Chest pain     Headache(784.0)     High blood pressure     High cholesterol     Hypertension     Increased storage iron     Migraine        Past Surgical History:   Procedure Laterality Date    COLONOSCOPY      NO PAST SURGERIES      MI TONSILLECTOMY & ADENOIDECTOMY AGE 11/> N/A 9/21/2023    Procedure: TONSILLECTOMY;  Surgeon: Tha Brown MD;  Location: [facility];  Service: ENT        Family History   Problem Relation Age of Onset    Cancer Mother         Breast cancer    Cancer Father      I have reviewed and agree with the history as documented.    E-Cigarette/Vaping    E-Cigarette Use Never User      E-Cigarette/Vaping Substances    Nicotine No     THC No     CBD No     Flavoring No     Other No     Unknown No      Social History     Tobacco Use    Smoking status: Never     Passive exposure: Never    Smokeless tobacco: Never    Tobacco comments:     Occasionally   Vaping Use    Vaping status: Never Used   Substance Use Topics    Alcohol use: Yes     Comment: Occasionally    Drug use: Never       Review of Systems   Constitutional:  Negative for chills and fever.   HENT:  Negative for ear pain and sore throat.    Eyes:  Negative for pain and visual disturbance.   Respiratory:  Negative for cough and shortness of breath.    Cardiovascular:  Negative for chest pain and palpitations.   Gastrointestinal:  Positive for abdominal pain. Negative for vomiting.   Genitourinary:  Negative for dysuria and hematuria.   Musculoskeletal:  Negative for arthralgias and back pain.   Skin:  Negative for color change and rash.   Neurological:  Negative for seizures and syncope.   All other systems reviewed and are negative.      Physical Exam  Physical Exam  Vitals and nursing note reviewed.   Constitutional:       General: He is not in acute distress.     Appearance: He is well-developed.   HENT:      Head: Normocephalic and atraumatic.   Eyes:      Conjunctiva/sclera: Conjunctivae normal.   Cardiovascular:      Rate and Rhythm: Normal rate and regular rhythm.      Heart sounds: No murmur heard.  Pulmonary:      Effort: Pulmonary effort is normal. No respiratory distress.      Breath sounds: Normal breath sounds.   Abdominal:      General: Abdomen is flat. Bowel sounds are normal. There is no distension.      Palpations: Abdomen is soft.      Tenderness: There is abdominal tenderness in the left upper  quadrant and left lower quadrant. There is left CVA tenderness.      Comments: Abdomen is soft, nondistended, with bowel sound present to all 4 quadrants.  Tenderness to palpation noted along left side of abdomen as well as left CVA region.   Musculoskeletal:         General: No swelling.      Cervical back: Neck supple.   Skin:     General: Skin is warm and dry.      Capillary Refill: Capillary refill takes less than 2 seconds.   Neurological:      Mental Status: He is alert.   Psychiatric:         Mood and Affect: Mood normal.         Vital Signs  ED Triage Vitals [02/17/24 1007]   Temperature Pulse Respirations Blood Pressure SpO2   98.3 °F (36.8 °C) (!) 107 16 (!) 184/184 95 %      Temp Source Heart Rate Source Patient Position - Orthostatic VS BP Location FiO2 (%)   Temporal Monitor Sitting Right arm --      Pain Score       8           Vitals:    02/17/24 1007   BP: (!) 184/184   Pulse: (!) 107   Patient Position - Orthostatic VS: Sitting         Visual Acuity      ED Medications  Medications   tamsulosin (FLOMAX) capsule 0.4 mg (has no administration in time range)   HYDROmorphone (DILAUDID) injection 1 mg (has no administration in time range)   acetaminophen (Ofirmev) injection 1,000 mg (0 mg Intravenous Stopped 2/17/24 1143)   sodium chloride 0.9 % bolus 1,000 mL (0 mL Intravenous Stopped 2/17/24 1229)   iohexol (OMNIPAQUE) 350 MG/ML injection (MULTI-DOSE) 100 mL (100 mL Intravenous Given 2/17/24 1343)   HYDROmorphone (DILAUDID) injection 0.5 mg (0.5 mg Intravenous Given 2/17/24 1353)   ondansetron (ZOFRAN) injection 4 mg (4 mg Intravenous Given 2/17/24 1358)   cefTRIAXone (ROCEPHIN) IVPB (premix in dextrose) 1,000 mg 50 mL (0 mg Intravenous Stopped 2/17/24 1543)       Diagnostic Studies  Results Reviewed       Procedure Component Value Units Date/Time    FLU/RSV/COVID - if FLU/RSV clinically relevant [[id number]] Collected: 02/17/24 6082    Lab Status: In process Specimen: Nares from Nose Updated: 02/17/24  1555    Urine culture [[id number]]     Lab Status: No result Specimen: Urine     Lactic acid, plasma (w/reflex if result > 2.0) [[id number]]  (Normal) Collected: 02/17/24 1501    Lab Status: Final result Specimen: Blood from Arm, Left Updated: 02/17/24 1525     LACTIC ACID 1.9 mmol/L     Narrative:      Result may be elevated if tourniquet was used during collection.    Blood culture #1 [[id number]] Collected: 02/17/24 1512    Lab Status: In process Specimen: Blood from Arm, Left Updated: 02/17/24 1519    Blood culture #2 [[id number]] Collected: 02/17/24 1512    Lab Status: In process Specimen: Blood from Hand, Left Updated: 02/17/24 1519    Urine Microscopic [[id number]]  (Normal) Collected: 02/17/24 1133    Lab Status: Final result Specimen: Urine, Clean Catch Updated: 02/17/24 1226     RBC, UA 2-4 /hpf      WBC, UA 0-1 /hpf      Epithelial Cells None Seen /hpf      Bacteria, UA Occasional /hpf     UA w Reflex to Microscopic w Reflex to Culture [[id number]]  (Abnormal) Collected: 02/17/24 1133    Lab Status: Final result Specimen: Urine, Clean Catch Updated: 02/17/24 1224     Color, UA Yellow     Clarity, UA Clear     Specific Gravity, UA 1.015     pH, UA 7.0     Leukocytes, UA Negative     Nitrite, UA Negative     Protein, UA Trace mg/dl      Glucose, UA Negative mg/dl      Ketones, UA Negative mg/dl      Urobilinogen, UA 0.2 E.U./dl      Bilirubin, UA Negative     Occult Blood, UA Moderate    Comprehensive metabolic panel [[id number]]  (Abnormal) Collected: 02/17/24 1128    Lab Status: Final result Specimen: Blood from Arm, Left Updated: 02/17/24 1203     Sodium 136 mmol/L      Potassium 4.4 mmol/L      Chloride 102 mmol/L      CO2 23 mmol/L      ANION GAP 11 mmol/L      BUN 15 mg/dL      Creatinine 1.23 mg/dL      Glucose 114 mg/dL      Calcium 8.9 mg/dL      AST 42 U/L      ALT 92 U/L      Alkaline Phosphatase 121 U/L      Total Protein 7.7 g/dL      Albumin 4.5 g/dL      Total Bilirubin 0.70 mg/dL      eGFR 70  ml/min/1.73sq m     Narrative:      National Kidney Disease Foundation guidelines for Chronic Kidney Disease (CKD):     Stage 1 with normal or high GFR (GFR > 90 mL/min/1.73 square meters)    Stage 2 Mild CKD (GFR = 60-89 mL/min/1.73 square meters)    Stage 3A Moderate CKD (GFR = 45-59 mL/min/1.73 square meters)    Stage 3B Moderate CKD (GFR = 30-44 mL/min/1.73 square meters)    Stage 4 Severe CKD (GFR = 15-29 mL/min/1.73 square meters)    Stage 5 End Stage CKD (GFR <15 mL/min/1.73 square meters)  Note: GFR calculation is accurate only with a steady state creatinine    Magnesium [[id number]]  (Abnormal) Collected: 02/17/24 1128    Lab Status: Final result Specimen: Blood from Arm, Left Updated: 02/17/24 1203     Magnesium 1.8 mg/dL     Lipase [[id number]]  (Abnormal) Collected: 02/17/24 1128    Lab Status: Final result Specimen: Blood from Arm, Left Updated: 02/17/24 1203     Lipase 8 u/L     CBC and differential [[id number]]  (Abnormal) Collected: 02/17/24 1128    Lab Status: Final result Specimen: Blood from Arm, Left Updated: 02/17/24 1138     WBC 17.33 Thousand/uL      RBC 5.92 Million/uL      Hemoglobin 17.7 g/dL      Hematocrit 51.2 %      MCV 87 fL      MCH 29.9 pg      MCHC 34.6 g/dL      RDW 11.9 %      MPV 9.8 fL      Platelets 217 Thousands/uL      nRBC 0 /100 WBCs      Neutrophils Relative 85 %      Immat GRANS % 1 %      Lymphocytes Relative 8 %      Monocytes Relative 6 %      Eosinophils Relative 0 %      Basophils Relative 0 %      Neutrophils Absolute 14.61 Thousands/µL      Immature Grans Absolute 0.15 Thousand/uL      Lymphocytes Absolute 1.44 Thousands/µL      Monocytes Absolute 1.07 Thousand/µL      Eosinophils Absolute 0.01 Thousand/µL      Basophils Absolute 0.05 Thousands/µL                    CT abdomen pelvis with contrast   Final Result by Jeff Singletary DO (02/17 1426)      2 mm left UVJ calculus causing mild hydroureteronephrosis. Asymmetric left perinephric edema, presumably  reactive.      Fatty liver.      Borderline prostatomegaly.      Additional incidental findings as above.      Workstation performed: [id number]                    Procedures  Procedures         ED Course  ED Course as of 02/17/24 1555   Sat Feb 17, 2024   1136 Patient refuses viral swab as he does not believe in them.   1351 Complains of increasing abdominal pain.  Will give some Dilaudid.   1425 Case discussed with urologist on-call, Dr. Pelayo who agrees with admitting patient.  N.p.o. after midnight for possible or in the morning for stenting if patient continues to have pain.   1450 Patient reexamined at bedside.  Pain is coming back.  Will repeat pain medication.  CT scan showed left UVJ kidney stone that is 2.5 mm and size.  At this time, in light of intractable pain, patient will be admitted.  Urologist, Dr. Pelayo notified.                               SBIRT 22yo+      Flowsheet Row Most Recent Value   Initial Alcohol Screen: US AUDIT-C     2. How many drinks containing alcohol do you have on a typical day you are drinking?  0 Filed at: 02/17/2024 1009   3a. Male UNDER 65: How often do you have five or more drinks on one occasion? 0 Filed at: 02/17/2024 1009   Audit-C Score 0 Filed at: 02/17/2024 1009   HONORIO: How many times in the past year have you...    Used an illegal drug or used a prescription medication for non-medical reasons? Never Filed at: 02/17/2024 1009                      Medical Decision Making  Obtain blood work, UA, CT abdomen/pelvis  Give IV fluids, pain medication continue to monitor patient for any worsening symptoms.    Patient's blood work showed some leukocytosis.  UA showed hematuria without any signs of infection.  Patient's CT scan showed 2 mm left UVJ calculus causing mild hydroureteronephrosis. Asymmetric left perinephric edema, presumably reactive.  Patient required multiple doses of IV narcotics for symptom control.  At this time Case was discussed with urologist on-call  who recommended admit to medicine and n.p.o. after midnight for possible OR stenting in the morning.  Patient agrees with admission plans.    Amount and/or Complexity of Data Reviewed  Labs: ordered. Decision-making details documented in ED Course.  Radiology: ordered. Decision-making details documented in ED Course.    Risk  Prescription drug management.  Decision regarding hospitalization.             Disposition  Final diagnoses:   Left nephrolithiasis   Intractable pain     Time reflects when diagnosis was documented in both MDM as applicable and the Disposition within this note       Time User Action Codes Description Comment    2/17/2024  3:51 PM Stella Araujo Add [N20.0] Left nephrolithiasis     2/17/2024  3:51 PM Stella Araujo Add [R52] Intractable pain     2/17/2024  3:51 PM Tara Gomez Add [N20.1] Calculus of ureterovesical junction (UVJ)           ED Disposition       ED Disposition   Admit    Condition   Stable    Date/Time   Sat Feb 17, 2024 3681    Comment   Case was discussed with Dr. Gtz and the patient's admission status was agreed to be Admission Status: observation status to the service of Dr. Gtz.               Follow-up Information    None         Patient's Medications   Discharge Prescriptions    No medications on file       No discharge procedures on file.    PDMP Review         Value Time User    PDMP Reviewed  Yes 9/26/2023  2:16 PM Tha Brown MD            ED Provider  Electronically Signed by             Stella Araujo DO  02/17/24 0564

## 2024-02-18 ENCOUNTER — ANESTHESIA (INPATIENT)
Dept: PERIOP | Facility: HOSPITAL | Age: 44
DRG: 854 | End: 2024-02-18
Payer: MEDICARE

## 2024-02-18 ENCOUNTER — APPOINTMENT (INPATIENT)
Dept: RADIOLOGY | Facility: HOSPITAL | Age: 44
DRG: 854 | End: 2024-02-18
Payer: MEDICARE

## 2024-02-18 ENCOUNTER — ANESTHESIA EVENT (INPATIENT)
Dept: PERIOP | Facility: HOSPITAL | Age: 44
DRG: 854 | End: 2024-02-18
Payer: MEDICARE

## 2024-02-18 LAB
ANION GAP SERPL CALCULATED.3IONS-SCNC: 8 MMOL/L
BUN SERPL-MCNC: 16 MG/DL (ref 5–25)
CALCIUM SERPL-MCNC: 8.2 MG/DL (ref 8.4–10.2)
CHLORIDE SERPL-SCNC: 100 MMOL/L (ref 96–108)
CO2 SERPL-SCNC: 26 MMOL/L (ref 21–32)
CREAT SERPL-MCNC: 1.37 MG/DL (ref 0.6–1.3)
ERYTHROCYTE [DISTWIDTH] IN BLOOD BY AUTOMATED COUNT: 12.6 % (ref 11.6–15.1)
GFR SERPL CREATININE-BSD FRML MDRD: 62 ML/MIN/1.73SQ M
GLUCOSE P FAST SERPL-MCNC: 121 MG/DL (ref 65–99)
GLUCOSE SERPL-MCNC: 121 MG/DL (ref 65–140)
HCT VFR BLD AUTO: 48.2 % (ref 36.5–49.3)
HGB BLD-MCNC: 16.7 G/DL (ref 12–17)
MCH RBC QN AUTO: 30.2 PG (ref 26.8–34.3)
MCHC RBC AUTO-ENTMCNC: 34.6 G/DL (ref 31.4–37.4)
MCV RBC AUTO: 87 FL (ref 82–98)
PLATELET # BLD AUTO: 189 THOUSANDS/UL (ref 149–390)
PMV BLD AUTO: 9.9 FL (ref 8.9–12.7)
POTASSIUM SERPL-SCNC: 4 MMOL/L (ref 3.5–5.3)
RBC # BLD AUTO: 5.53 MILLION/UL (ref 3.88–5.62)
SODIUM SERPL-SCNC: 134 MMOL/L (ref 135–147)
WBC # BLD AUTO: 17.06 THOUSAND/UL (ref 4.31–10.16)

## 2024-02-18 PROCEDURE — C1769 GUIDE WIRE: HCPCS | Performed by: SPECIALIST

## 2024-02-18 PROCEDURE — C2617 STENT, NON-COR, TEM W/O DEL: HCPCS | Performed by: SPECIALIST

## 2024-02-18 PROCEDURE — 74420 UROGRAPHY RTRGR +-KUB: CPT

## 2024-02-18 PROCEDURE — 0TC78ZZ EXTIRPATION OF MATTER FROM LEFT URETER, VIA NATURAL OR ARTIFICIAL OPENING ENDOSCOPIC: ICD-10-PCS | Performed by: SPECIALIST

## 2024-02-18 PROCEDURE — 85027 COMPLETE CBC AUTOMATED: CPT

## 2024-02-18 PROCEDURE — 80048 BASIC METABOLIC PNL TOTAL CA: CPT

## 2024-02-18 PROCEDURE — 0T778DZ DILATION OF LEFT URETER WITH INTRALUMINAL DEVICE, VIA NATURAL OR ARTIFICIAL OPENING ENDOSCOPIC: ICD-10-PCS | Performed by: SPECIALIST

## 2024-02-18 PROCEDURE — BT1F1ZZ FLUOROSCOPY OF LEFT KIDNEY, URETER AND BLADDER USING LOW OSMOLAR CONTRAST: ICD-10-PCS | Performed by: SPECIALIST

## 2024-02-18 PROCEDURE — 99232 SBSQ HOSP IP/OBS MODERATE 35: CPT | Performed by: STUDENT IN AN ORGANIZED HEALTH CARE EDUCATION/TRAINING PROGRAM

## 2024-02-18 DEVICE — INLAY URETERAL STENT W/O GUIDEWIRE
Type: IMPLANTABLE DEVICE | Site: URETER | Status: FUNCTIONAL
Brand: BARD® INLAY® URETERAL STENT

## 2024-02-18 RX ORDER — FENTANYL CITRATE 50 UG/ML
INJECTION, SOLUTION INTRAMUSCULAR; INTRAVENOUS AS NEEDED
Status: DISCONTINUED | OUTPATIENT
Start: 2024-02-18 | End: 2024-02-18

## 2024-02-18 RX ORDER — SODIUM CHLORIDE, SODIUM LACTATE, POTASSIUM CHLORIDE, CALCIUM CHLORIDE 600; 310; 30; 20 MG/100ML; MG/100ML; MG/100ML; MG/100ML
INJECTION, SOLUTION INTRAVENOUS CONTINUOUS PRN
Status: DISCONTINUED | OUTPATIENT
Start: 2024-02-18 | End: 2024-02-18

## 2024-02-18 RX ORDER — LEVOFLOXACIN 5 MG/ML
INJECTION, SOLUTION INTRAVENOUS CONTINUOUS PRN
Status: DISCONTINUED | OUTPATIENT
Start: 2024-02-18 | End: 2024-02-18

## 2024-02-18 RX ORDER — DEXAMETHASONE SODIUM PHOSPHATE 10 MG/ML
INJECTION, SOLUTION INTRAMUSCULAR; INTRAVENOUS AS NEEDED
Status: DISCONTINUED | OUTPATIENT
Start: 2024-02-18 | End: 2024-02-18

## 2024-02-18 RX ORDER — SODIUM CHLORIDE, SODIUM LACTATE, POTASSIUM CHLORIDE, CALCIUM CHLORIDE 600; 310; 30; 20 MG/100ML; MG/100ML; MG/100ML; MG/100ML
100 INJECTION, SOLUTION INTRAVENOUS CONTINUOUS
Status: DISCONTINUED | OUTPATIENT
Start: 2024-02-18 | End: 2024-02-19

## 2024-02-18 RX ORDER — FENTANYL CITRATE/PF 50 MCG/ML
50 SYRINGE (ML) INJECTION
Status: DISCONTINUED | OUTPATIENT
Start: 2024-02-18 | End: 2024-02-18 | Stop reason: HOSPADM

## 2024-02-18 RX ORDER — PROPOFOL 10 MG/ML
INJECTION, EMULSION INTRAVENOUS AS NEEDED
Status: DISCONTINUED | OUTPATIENT
Start: 2024-02-18 | End: 2024-02-18

## 2024-02-18 RX ORDER — LIDOCAINE HYDROCHLORIDE 10 MG/ML
INJECTION, SOLUTION EPIDURAL; INFILTRATION; INTRACAUDAL; PERINEURAL AS NEEDED
Status: DISCONTINUED | OUTPATIENT
Start: 2024-02-18 | End: 2024-02-18

## 2024-02-18 RX ORDER — BISACODYL 10 MG
10 SUPPOSITORY, RECTAL RECTAL DAILY PRN
Status: DISCONTINUED | OUTPATIENT
Start: 2024-02-18 | End: 2024-02-21 | Stop reason: HOSPADM

## 2024-02-18 RX ORDER — MIDAZOLAM HYDROCHLORIDE 2 MG/2ML
INJECTION, SOLUTION INTRAMUSCULAR; INTRAVENOUS AS NEEDED
Status: DISCONTINUED | OUTPATIENT
Start: 2024-02-18 | End: 2024-02-18

## 2024-02-18 RX ORDER — MAGNESIUM HYDROXIDE 1200 MG/15ML
LIQUID ORAL AS NEEDED
Status: DISCONTINUED | OUTPATIENT
Start: 2024-02-18 | End: 2024-02-18 | Stop reason: HOSPADM

## 2024-02-18 RX ORDER — ONDANSETRON 2 MG/ML
INJECTION INTRAMUSCULAR; INTRAVENOUS AS NEEDED
Status: DISCONTINUED | OUTPATIENT
Start: 2024-02-18 | End: 2024-02-18

## 2024-02-18 RX ORDER — ONDANSETRON 2 MG/ML
4 INJECTION INTRAMUSCULAR; INTRAVENOUS ONCE AS NEEDED
Status: DISCONTINUED | OUTPATIENT
Start: 2024-02-18 | End: 2024-02-18 | Stop reason: HOSPADM

## 2024-02-18 RX ADMIN — PROPOFOL 150 MG: 10 INJECTION, EMULSION INTRAVENOUS at 15:29

## 2024-02-18 RX ADMIN — HYDROMORPHONE HYDROCHLORIDE 0.5 MG: 1 INJECTION, SOLUTION INTRAMUSCULAR; INTRAVENOUS; SUBCUTANEOUS at 11:51

## 2024-02-18 RX ADMIN — LIDOCAINE HYDROCHLORIDE 50 MG: 10 INJECTION, SOLUTION EPIDURAL; INFILTRATION; INTRACAUDAL; PERINEURAL at 15:28

## 2024-02-18 RX ADMIN — SODIUM CHLORIDE 125 ML/HR: 0.9 INJECTION, SOLUTION INTRAVENOUS at 02:45

## 2024-02-18 RX ADMIN — DEXAMETHASONE SODIUM PHOSPHATE 10 MG: 10 INJECTION, SOLUTION INTRAMUSCULAR; INTRAVENOUS at 15:46

## 2024-02-18 RX ADMIN — FENTANYL CITRATE 25 MCG: 50 INJECTION, SOLUTION INTRAMUSCULAR; INTRAVENOUS at 15:29

## 2024-02-18 RX ADMIN — HYDROMORPHONE HYDROCHLORIDE 1 MG: 1 INJECTION, SOLUTION INTRAMUSCULAR; INTRAVENOUS; SUBCUTANEOUS at 02:43

## 2024-02-18 RX ADMIN — FENOFIBRATE 145 MG: 145 TABLET, FILM COATED ORAL at 08:42

## 2024-02-18 RX ADMIN — AMLODIPINE BESYLATE 10 MG: 10 TABLET ORAL at 08:42

## 2024-02-18 RX ADMIN — LISINOPRIL 10 MG: 10 TABLET ORAL at 08:42

## 2024-02-18 RX ADMIN — LEVOFLOXACIN: 500 INJECTION, SOLUTION INTRAVENOUS at 15:24

## 2024-02-18 RX ADMIN — HYDROMORPHONE HYDROCHLORIDE 0.5 MG: 1 INJECTION, SOLUTION INTRAMUSCULAR; INTRAVENOUS; SUBCUTANEOUS at 20:44

## 2024-02-18 RX ADMIN — ONDANSETRON 4 MG: 2 INJECTION INTRAMUSCULAR; INTRAVENOUS at 22:05

## 2024-02-18 RX ADMIN — FENTANYL CITRATE 25 MCG: 50 INJECTION, SOLUTION INTRAMUSCULAR; INTRAVENOUS at 16:03

## 2024-02-18 RX ADMIN — HYDROMORPHONE HYDROCHLORIDE 1 MG: 1 INJECTION, SOLUTION INTRAMUSCULAR; INTRAVENOUS; SUBCUTANEOUS at 12:50

## 2024-02-18 RX ADMIN — HYDROMORPHONE HYDROCHLORIDE 0.5 MG: 1 INJECTION, SOLUTION INTRAMUSCULAR; INTRAVENOUS; SUBCUTANEOUS at 00:15

## 2024-02-18 RX ADMIN — MIDAZOLAM 2 MG: 1 INJECTION INTRAMUSCULAR; INTRAVENOUS at 15:24

## 2024-02-18 RX ADMIN — ONDANSETRON 4 MG: 2 INJECTION INTRAMUSCULAR; INTRAVENOUS at 15:46

## 2024-02-18 RX ADMIN — SODIUM CHLORIDE, SODIUM LACTATE, POTASSIUM CHLORIDE, AND CALCIUM CHLORIDE: .6; .31; .03; .02 INJECTION, SOLUTION INTRAVENOUS at 15:24

## 2024-02-18 RX ADMIN — ONDANSETRON 4 MG: 2 INJECTION INTRAMUSCULAR; INTRAVENOUS at 02:47

## 2024-02-18 RX ADMIN — FENTANYL CITRATE 50 MCG: 50 INJECTION, SOLUTION INTRAMUSCULAR; INTRAVENOUS at 15:27

## 2024-02-18 RX ADMIN — SODIUM CHLORIDE, SODIUM LACTATE, POTASSIUM CHLORIDE, AND CALCIUM CHLORIDE 100 ML/HR: .6; .31; .03; .02 INJECTION, SOLUTION INTRAVENOUS at 17:50

## 2024-02-18 RX ADMIN — SODIUM CHLORIDE 125 ML/HR: 0.9 INJECTION, SOLUTION INTRAVENOUS at 11:52

## 2024-02-18 NOTE — ANESTHESIA PREPROCEDURE EVALUATION
Procedure:  CYSTOSCOPY URETEROSCOPY WITH LITHOTRIPSY HOLMIUM LASER, RETROGRADE PYELOGRAM AND INSERTION STENT URETERAL (Left: Bladder)    Relevant Problems   ANESTHESIA (within normal limits)      CARDIO   (+) Primary hypertension      GI/HEPATIC   (+) Gastroesophageal reflux disease without esophagitis      NEURO/PSYCH   (+) Chronic neck pain      PULMONARY (within normal limits)      Other   (+) Polycythemia vera (HCC)        Physical Exam    Airway    Mallampati score: II  TM Distance: >3 FB  Neck ROM: full     Dental   No notable dental hx     Cardiovascular  Rhythm: regular, Rate: normal    Pulmonary   Breath sounds clear to auscultation    Other Findings        Anesthesia Plan  ASA Score- 2 Emergent    Anesthesia Type- general with ASA Monitors.         Additional Monitors:     Airway Plan: LMA.           Plan Factors-Exercise tolerance (METS): >4 METS.    Chart reviewed.   Existing labs reviewed. Patient summary reviewed.    Patient is not a current smoker.              Induction- intravenous.    Postoperative Plan- Plan for postoperative opioid use.     Informed Consent- Anesthetic plan and risks discussed with patient and mother.  I personally reviewed this patient with the CRNA. Discussed and agreed on the Anesthesia Plan with the CRNA..

## 2024-02-18 NOTE — ANESTHESIA POSTPROCEDURE EVALUATION
Post-Op Assessment Note    CV Status:  Stable  Pain Score: 0    Pain management: adequate       Mental Status:  Sleepy   Hydration Status:  Stable   PONV Controlled:  None   Airway Patency:  Patent     Post Op Vitals Reviewed: Yes    No anethesia notable event occurred.    Staff: Anesthesiologist, CRNA               BP   106/60   Temp      Pulse  90   Resp   14   SpO2   98

## 2024-02-18 NOTE — PLAN OF CARE
Problem: GENITOURINARY - ADULT  Goal: Maintains or returns to baseline urinary function  Description: INTERVENTIONS:  - Assess urinary function  - Encourage oral fluids to ensure adequate hydration if ordered  - Administer IV fluids as ordered to ensure adequate hydration  - Administer ordered medications as needed  - Offer frequent toileting  - Follow urinary retention protocol if ordered  2/18/2024 0606 by Orquidea Morales RN  Outcome: Progressing  2/18/2024 0606 by Orquidea Morales RN  Outcome: Progressing  Goal: Absence of urinary retention  Description: INTERVENTIONS:  - Assess patient’s ability to void and empty bladder  - Monitor I/O  - Bladder scan as needed  - Discuss with physician/AP medications to alleviate retention as needed  - Discuss catheterization for long term situations as appropriate  2/18/2024 0606 by Orquidea Morales RN  Outcome: Progressing  2/18/2024 0606 by Orquidea Morales RN  Outcome: Progressing     Problem: PAIN - ADULT  Goal: Verbalizes/displays adequate comfort level or baseline comfort level  Description: Interventions:  - Encourage patient to monitor pain and request assistance  - Assess pain using appropriate pain scale  - Administer analgesics based on type and severity of pain and evaluate response  - Implement non-pharmacological measures as appropriate and evaluate response  - Consider cultural and social influences on pain and pain management  - Notify physician/advanced practitioner if interventions unsuccessful or patient reports new pain  2/18/2024 0606 by Orquidea Morales RN  Outcome: Progressing  2/18/2024 0606 by Orquidea Morales RN  Outcome: Progressing     Problem: Knowledge Deficit  Goal: Patient/family/caregiver demonstrates understanding of disease process, treatment plan, medications, and discharge instructions  Description: Complete learning assessment and assess knowledge base.  Interventions:  - Provide teaching  at level of understanding  - Provide teaching via preferred learning methods  Outcome: Progressing

## 2024-02-18 NOTE — OP NOTE
OPERATIVE REPORT  PATIENT NAME: Jitendra Silva    :  1980  MRN: [id number]  Pt Location: [facility] OR ROOM 04    SURGERY DATE: 2024    Surgeons and Role:     * Trevon Pelayo MD - Primary    Preop Diagnosis:  Left nephrolithiasis [N20.0]  2 mm distal stone    Post-Op Diagnosis Codes:     * Left nephrolithiasis [N20.0]    Procedure(s):  Left - CYSTOSCOPY URETEROSCOPY WITH basket stone extraction.  RETROGRADE PYELOGRAM AND INSERTION STENT URETERAL    Specimen(s):  * No specimens in log *    Estimated Blood Loss:   0 mL    Drains:  * No LDAs found *    Anesthesia Type:   General/LMA    Operative Indications:  Left nephrolithiasis [N20.0]  This 43-year-old male presented to [facility] yesterday afternoon with a 3-day complaint of worsening flank pain found to have a 2 mm left distal ureterovesical junction stone on CAT scan admitted urologic consult called for seen last night patient having severe pain throughout the night and this morning now wishes to have it out soon as possible with ureteroscopy basket extraction and stent placement aware risk of anesthesia infection bleeding additional Yannes procedures    Operative Findings:  2 mm left distal ureterovesical junction stone fragmented into 1 mm pieces when the basket removed it unable to send specimen 24 cm 6 Thai stent passed    Complications:   None    Procedure and Technique:  Patient identified in the holding area consent obtained left side marked placed in the op suite after anesthesia induced placed in thigh position draped and prepped standard fashion timeout performed 22 Thai scope passed through the bladder anterior to showed the malleus posterior to confirmed a 2.5 cm bilobar prostatic urethra the scope was inserted to the bladder lumen no abnormalities to the bladder noted 8.038 wire was easily passed up the left orifice into the left renal pelvis the semirigid ureteroscope was then passed into the bladder attempts at getting the  scope into the ureteral orifice with 0.038 wire was unsuccessful 8.038 wire was then placed with difficulty and up into the left renal pelvis the scope was then guided into the orifice with significant edema noted the wire was removed the stone was noted to be in pieces apparently fragmenting from the 2 wires retrograde pyelogram confirmed no remaining stone fragments in the distal mid proximal ureter the basket was placed and 1 mm fragments removed the scope was removed the cystoscope was replaced over wire 24 cm 6 Macanese stent was passed were removed scope removed.  Procedure well awakened taken recovery in stable condition   I was present for the entire procedure.    Patient Disposition:  PACU         SIGNATURE: Trevon Pelayo MD  DATE: February 18, 2024  TIME: 4:12 PM

## 2024-02-18 NOTE — PROGRESS NOTES
"Progress Note - Urology      Patient: Jitendra Silva   : 1980 Sex: male   MRN: [id number]     CSN: [id number]  Unit/Bed#: [address] [place] [address]     SUBJECTIVE:   Patient seen on morning rounds with mother present  As abdominal distention apparently constipated also on analgesics  Noting left lower quadrant pain from 2 mm stone not wishing to go home on medical expulsion therapy wishing to have procedure to remove it with ureteroscopy aware risk of anesthesia infection bleeding additional procedures      Objective   Vitals: /98 (BP Location: Right arm)   Pulse 103   Temp 97.8 °F (36.6 °C) (Oral)   Resp 18   Ht 5' 8\" (1.727 m)   Wt 109 kg (241 lb)   SpO2 93%   BMI 36.64 kg/m²     I/O last 24 hours:  In: 1150 [IV Piggyback:1150]  Out: 750 [Urine:750]      Physical Exam:   General Alert awake   Normocephalic atraumatic PERRLA  Lungs clear bilaterally  Cardiac normal S1 normal S2  Abdomen soft, flank pain  Abdominal distention  Extremities no edema      Lab Results: CBC:   Lab Results   Component Value Date    WBC 17.06 (H) 2024    HGB 16.7 2024    HCT 48.2 2024    MCV 87 2024     2024    RBC 5.53 2024    MCH 30.2 2024    MCHC 34.6 2024    RDW 12.6 2024    MPV 9.9 2024    NRBC 0 2024     CMP:   Lab Results   Component Value Date     2024    CO2 26 2024    BUN 16 2024    CREATININE 1.37 (H) 2024    CALCIUM 8.2 (L) 2024    AST 42 (H) 2024    ALT 92 (H) 2024    ALKPHOS 121 (H) 2024    EGFR 62 2024     Urinalysis:   Lab Results   Component Value Date    COLORU Yellow 2024    CLARITYU Clear 2024    SPECGRAV 1.015 2024    PHUR 7.0 2024    LEUKOCYTESUR Negative 2024    NITRITE Negative 2024    GLUCOSEU Negative 2024    KETONESU Negative 2024    BILIRUBINUR Negative 2024    BLOODU Moderate (A) 2024     Urine Culture: No " "results found for: \"URINECX\"  PSA: No results found for: \"PSA\"      Assessment/ Plan:  Left distal 2 mm stone 90% chance of passing by expulsion therapy  Constipation  Abdominal distention  Keep n.p.o.  4 OR this afternoon cystoscopy left ureteroscopy by obstruction stent placement  Dulcolax suppository          Trevon Pelayo MD  "

## 2024-02-18 NOTE — DISCHARGE INSTR - AVS FIRST PAGE
#1 no heavy straining or lifting above 10 pounds for 2 weeks    #2 call office fevers, chills, or worsening blood in the urine.    #3  Patient to call office for follow-up on Tuesday or Wednesday this week for cystoscopy stent removal- confirmed appointment with Urology office 2/21/24 for stent removal      Trevon Pelayo M.D. [facility] office  [address] [place] [address].  [place] [address]  [phone]  8:30 AM to 4:30 PM  Monday through Friday    [place] office  [address]  [address]  [place] [address]  [phone]  1:00 to 5:00 PM  Wednesday

## 2024-02-18 NOTE — PROGRESS NOTES
Critical access hospital  Progress Note  Name: Jitendra Silva I  MRN: [id number]  Unit/Bed#: [address] [place] [address] Date of Admission: 2/17/2024   Date of Service: 2/18/2024 I Hospital Day: 0    Assessment/Plan   * Calculus of ureterovesical junction (UVJ)  Assessment & Plan  Patient presented to ED with left flank pain on and off past few days, worsening last night.  CT a/p: 2 mm left UVJ calculus causing mild hydroureteronephrosis. Asymmetric left perinephric edema, presumably reactive.  WBC 17.3  Cr mildly elevated from baseline  Continue IVF  Started on flomax  Nausea and pain medication as needed  Strain all urine  Urology consulted  With persistent pain, planned for cytoscopy ureteroscopy with stent placement today    Sepsis (HCC)  Assessment & Plan  POA evidenced by tachycardia and leukocytosis in setting of kidney stone/UTI  CT a/p: 2 mm left UVJ calculus causing mild hydroureteronephrosis. Asymmetric left perinephric edema, presumably reactive.  Lactate negative  Blood cultures pending  Urine culture pending  Continue rocephin    Elevated hemoglobin (HCC)  Assessment & Plan  Follows with hematology and rheumatology  At baseline    Primary hypertension  Assessment & Plan  Continue amlodipine 10 mg daily and lisinopril 10 mg daily    Dyslipidemia  Assessment & Plan  Continue fenofibrate           VTE Pharmacologic Prophylaxis: VTE Score: 3 Moderate Risk (Score 3-4) - Pharmacological DVT Prophylaxis Ordered: heparin.    Mobility:   Basic Mobility Inpatient Raw Score: 24  JH-HLM Goal: 8: Walk 250 feet or more  JH-HLM Achieved: 8: Walk 250 feet ot more  HLM Goal achieved. Continue to encourage appropriate mobility.    Patient Centered Rounds: I performed bedside rounds with nursing staff today.   Discussions with Specialists or Other Care Team Provider: urology, rn    Education and Discussions with Family / Patient: Updated  (mother) at bedside.    Total Time Spent on Date of Encounter in care  of patient: 35 mins. This time was spent on one or more of the following: performing physical exam; counseling and coordination of care; obtaining or reviewing history; documenting in the medical record; reviewing/ordering tests, medications or procedures; communicating with other healthcare professionals and discussing with patient's family/caregivers.    Current Length of Stay: 0 day(s)  Current Patient Status: Observation   Certification Statement: The patient, admitted on an observation basis, will now require > 2 midnight hospital stay due to pain management, cystoscopy, cultures pending, monitor labs  Discharge Plan: Anticipate discharge in 24-48 hrs to home.    Code Status: Level 1 - Full Code    Subjective:   Patient seen and examined at bedside. He is standing over the bed in pain. Reports continued, intermittent, sharp, stabbing pain on left side. Denies nausea, vomiting, or other symptoms.    Objective:     Vitals:   Temp (24hrs), Av.8 °F (36.6 °C), Min:97.3 °F (36.3 °C), Max:98.1 °F (36.7 °C)    Temp:  [97.3 °F (36.3 °C)-98.1 °F (36.7 °C)] 97.8 °F (36.6 °C)  HR:  [] 103  Resp:  [18-20] 18  BP: (142-191)/() 142/98  SpO2:  [93 %-95 %] 93 %  Body mass index is 36.64 kg/m².     Input and Output Summary (last 24 hours):     Intake/Output Summary (Last 24 hours) at 2024 1322  Last data filed at 2024 0245  Gross per 24 hour   Intake 50 ml   Output 750 ml   Net -700 ml       Physical Exam:   Physical Exam  Vitals and nursing note reviewed.   Constitutional:       General: He is not in acute distress.     Appearance: He is well-developed.   Cardiovascular:      Rate and Rhythm: Normal rate and regular rhythm.      Heart sounds: No murmur heard.  Pulmonary:      Effort: Pulmonary effort is normal. No respiratory distress.      Breath sounds: Normal breath sounds.   Abdominal:      Palpations: Abdomen is soft.      Tenderness: There is abdominal tenderness in the left lower quadrant.    Musculoskeletal:         General: No swelling.   Skin:     General: Skin is warm and dry.      Capillary Refill: Capillary refill takes less than 2 seconds.   Neurological:      Mental Status: He is alert.   Psychiatric:         Mood and Affect: Mood normal.          Additional Data:     Labs:  Results from last 7 days   Lab Units 02/18/24  0632 02/17/24  1128   WBC Thousand/uL 17.06* 17.33*   HEMOGLOBIN g/dL 16.7 17.7*   HEMATOCRIT % 48.2 51.2*   PLATELETS Thousands/uL 189 217   NEUTROS PCT %  --  85*   LYMPHS PCT %  --  8*   MONOS PCT %  --  6   EOS PCT %  --  0     Results from last 7 days   Lab Units 02/18/24  0633 02/17/24  1128   SODIUM mmol/L 134* 136   POTASSIUM mmol/L 4.0 4.4   CHLORIDE mmol/L 100 102   CO2 mmol/L 26 23   BUN mg/dL 16 15   CREATININE mg/dL 1.37* 1.23   ANION GAP mmol/L 8 11   CALCIUM mg/dL 8.2* 8.9   ALBUMIN g/dL  --  4.5   TOTAL BILIRUBIN mg/dL  --  0.70   ALK PHOS U/L  --  121*   ALT U/L  --  92*   AST U/L  --  42*   GLUCOSE RANDOM mg/dL 121 114                 Results from last 7 days   Lab Units 02/17/24  1501   LACTIC ACID mmol/L 1.9       Lines/Drains:  Invasive Devices       Peripheral Intravenous Line  Duration             Peripheral IV 02/17/24 Left Antecubital 1 day                          Imaging: No pertinent imaging reviewed.    Recent Cultures (last 7 days):   Results from last 7 days   Lab Units 02/17/24  1512   BLOOD CULTURE  Received in Microbiology Lab. Culture in Progress.  Received in Microbiology Lab. Culture in Progress.       Last 24 Hours Medication List:   Current Facility-Administered Medications   Medication Dose Route Frequency Provider Last Rate    acetaminophen  650 mg Oral Q6H PRN Tara Gomez PA-C      amLODIPine  10 mg Oral Daily Tara Gomez PA-C      bisacodyl  10 mg Rectal Daily PRN Trevon Pelayo MD      cefTRIAXone  2,000 mg Intravenous Q24H Taar Gomez PA-C      fenofibrate  145 mg Oral Daily Tara Gomez PA-C      heparin (porcine)  5,000  Units Subcutaneous Q8H formerly [facility] Tara Gomez PA-C      HYDROmorphone  0.5 mg Intravenous Q3H PRN Mary Gtz MD      HYDROmorphone  1 mg Intravenous Q6H PRN Mary Gtz MD      lisinopril  10 mg Oral Daily Tara Gomez PA-C      ondansetron  4 mg Intravenous Q6H PRN Tara Gomez PA-C      oxyCODONE  5 mg Oral Q4H PRN Mary Gtz MD      polyethylene glycol  17 g Oral Daily Mary Gtz MD      senna-docusate sodium  1 tablet Oral HS Mary Gtz MD      sodium chloride  125 mL/hr Intravenous Continuous Tara Gomez PA-C 125 mL/hr (02/18/24 1152)    tamsulosin  0.4 mg Oral Daily With Dinner Tara Gomez PA-C          Today, Patient Was Seen By: Tara Gomez PA-C    **Please Note: This note may have been constructed using a voice recognition system.**

## 2024-02-18 NOTE — CONSULTS
H&P Exam - Urology       Patient: Jitendra Silva   : 1980 Sex: male   MRN: [id number]     CSN: [id number]      History of Present Illness   HPI:  Jitendra Silva is a 43 y.o. male who presented to [facility] ER today with increasing left flank pain for the last 2 to 3 days found on CAT scan to have 2 mm left distal ureterovesical junction stone this is his first attack receiving analgesics in the ER still noting pain tonight        Review of Systems:   Constitutional:  Negative for activity change, fever, chills and diaphoresis.   HENT: Negative for hearing loss and trouble swallowing.   Eyes: Negative for itching and visual disturbance.   Respiratory: Negative for chest tightness and shortness of breath.   Cardiovascular: Negative for chest pain, edema.   Gastrointestinal: Negative for abdominal distention, na abdominal pain, constipation, diarrhea, Nausea and vomiting.   Genitourinary: Negative for decreased urine volume, difficulty urinating, dysuria, enuresis, frequency, hematuria and urgency.   Musculoskeletal: Negative for gait problem and myalgias.   Neurological: Negative for dizziness and headaches.   Hematological: Does not bruise/bleed easily.       Historical Information   Past Medical History:   Diagnosis Date    Chest pain     Headache(784.0)     High blood pressure     High cholesterol     Hypertension     Increased storage iron     Migraine      Past Surgical History:   Procedure Laterality Date    COLONOSCOPY      NO PAST SURGERIES      UT TONSILLECTOMY & ADENOIDECTOMY AGE 11/> N/A 2023    Procedure: TONSILLECTOMY;  Surgeon: Tha Brown MD;  Location: [facility] MAIN OR;  Service: ENT     Social History   Social History     Substance and Sexual Activity   Alcohol Use Yes    Comment: Occasionally     Social History     Substance and Sexual Activity   Drug Use Never     Social History     Tobacco Use   Smoking Status Never    Passive exposure: Never   Smokeless Tobacco Never  "  Tobacco Comments    Occasionally     Family History:   Family History   Problem Relation Age of Onset    Cancer Mother         Breast cancer    Cancer Father        Meds/Allergies   Medications Prior to Admission   Medication    amLODIPine (NORVASC) 10 mg tablet    ergocalciferol (VITAMIN D2) 50,000 units    fenofibrate (TRICOR) 145 mg tablet    lisinopril (ZESTRIL) 10 mg tablet    oxyCODONE-acetaminophen (Percocet) 5-325 mg per tablet     Allergies   Allergen Reactions    Penicillins Hives     Reaction Date: 11Feb2005;       Shellfish-Derived Products - Food Allergy Itching       Objective   Vitals: BP (!) 176/113 (BP Location: Left arm)   Pulse (!) 106   Temp 97.9 °F (36.6 °C) (Oral)   Resp 18   Ht 5' 8\" (1.727 m)   Wt 109 kg (241 lb)   SpO2 93%   BMI 36.64 kg/m²     Physical Exam:  General Alert awake   Normocephalic atraumatic PERRLA  Lungs clear bilaterally  Cardiac normal S1 normal S2  Abdomen soft, flank pain left  Extremities no edema    I/O last 24 hours:  In: 1150 [IV Piggyback:1150]  Out: -     Invasive Devices       Peripheral Intravenous Line  Duration             Peripheral IV 02/17/24 Left Antecubital <1 day                        Lab Results: CBC:   Lab Results   Component Value Date    WBC 17.33 (H) 02/17/2024    HGB 17.7 (H) 02/17/2024    HCT 51.2 (H) 02/17/2024    MCV 87 02/17/2024     02/17/2024    RBC 5.92 (H) 02/17/2024    MCH 29.9 02/17/2024    MCHC 34.6 02/17/2024    RDW 11.9 02/17/2024    MPV 9.8 02/17/2024    NRBC 0 02/17/2024     CMP:   Lab Results   Component Value Date     02/17/2024    CO2 23 02/17/2024    BUN 15 02/17/2024    CREATININE 1.23 02/17/2024    CALCIUM 8.9 02/17/2024    AST 42 (H) 02/17/2024    ALT 92 (H) 02/17/2024    ALKPHOS 121 (H) 02/17/2024    EGFR 70 02/17/2024     Urinalysis:   Lab Results   Component Value Date    COLORU Yellow 02/17/2024    CLARITYU Clear 02/17/2024    SPECGRAV 1.015 02/17/2024    PHUR 7.0 02/17/2024    LEUKOCYTESUR Negative " "02/17/2024    NITRITE Negative 02/17/2024    GLUCOSEU Negative 02/17/2024    KETONESU Negative 02/17/2024    BILIRUBINUR Negative 02/17/2024    BLOODU Moderate (A) 02/17/2024     Urine Culture: No results found for: \"URINECX\"  PSA: No results found for: \"PSA\"        Assessment/ Plan:  2 mm left distal ureterovesical junction stone has 98% chance of passing with medical expulsion therapy  Start tamsulosin  Strain urine  Make n.p.o. after midnight if patient continues to have severe pain could consider cystoscopy left ureteroscopy basket extraction and stent placement tomorrow pending OR availability in light of multiple cases already scheduled      Trevon Pelayo MD    "

## 2024-02-18 NOTE — PLAN OF CARE
Problem: GENITOURINARY - ADULT  Goal: Maintains or returns to baseline urinary function  Description: INTERVENTIONS:  - Assess urinary function  - Encourage oral fluids to ensure adequate hydration if ordered  - Administer IV fluids as ordered to ensure adequate hydration  - Administer ordered medications as needed  - Offer frequent toileting  - Follow urinary retention protocol if ordered  Outcome: Progressing  Goal: Absence of urinary retention  Description: INTERVENTIONS:  - Assess patient’s ability to void and empty bladder  - Monitor I/O  - Bladder scan as needed  - Discuss with physician/AP medications to alleviate retention as needed  - Discuss catheterization for long term situations as appropriate  Outcome: Progressing     Problem: PAIN - ADULT  Goal: Verbalizes/displays adequate comfort level or baseline comfort level  Description: Interventions:  - Encourage patient to monitor pain and request assistance  - Assess pain using appropriate pain scale  - Administer analgesics based on type and severity of pain and evaluate response  - Implement non-pharmacological measures as appropriate and evaluate response  - Consider cultural and social influences on pain and pain management  - Notify physician/advanced practitioner if interventions unsuccessful or patient reports new pain  Outcome: Progressing     Problem: Knowledge Deficit  Goal: Patient/family/caregiver demonstrates understanding of disease process, treatment plan, medications, and discharge instructions  Description: Complete learning assessment and assess knowledge base.  Interventions:  - Provide teaching at level of understanding  - Provide teaching via preferred learning methods  Outcome: Progressing

## 2024-02-19 PROBLEM — K59.00 CONSTIPATION: Status: ACTIVE | Noted: 2024-02-19

## 2024-02-19 LAB
ALBUMIN SERPL BCP-MCNC: 4.1 G/DL (ref 3.5–5)
ALP SERPL-CCNC: 89 U/L (ref 34–104)
ALT SERPL W P-5'-P-CCNC: 51 U/L (ref 7–52)
ANION GAP SERPL CALCULATED.3IONS-SCNC: 9 MMOL/L
AST SERPL W P-5'-P-CCNC: 23 U/L (ref 13–39)
BACTERIA UR CULT: NORMAL
BILIRUB SERPL-MCNC: 0.69 MG/DL (ref 0.2–1)
BUN SERPL-MCNC: 17 MG/DL (ref 5–25)
CALCIUM SERPL-MCNC: 8.4 MG/DL (ref 8.4–10.2)
CHLORIDE SERPL-SCNC: 101 MMOL/L (ref 96–108)
CO2 SERPL-SCNC: 25 MMOL/L (ref 21–32)
CREAT SERPL-MCNC: 1.14 MG/DL (ref 0.6–1.3)
ERYTHROCYTE [DISTWIDTH] IN BLOOD BY AUTOMATED COUNT: 12.5 % (ref 11.6–15.1)
GFR SERPL CREATININE-BSD FRML MDRD: 77 ML/MIN/1.73SQ M
GLUCOSE SERPL-MCNC: 204 MG/DL (ref 65–140)
HCT VFR BLD AUTO: 47.1 % (ref 36.5–49.3)
HGB BLD-MCNC: 15.5 G/DL (ref 12–17)
MCH RBC QN AUTO: 29.5 PG (ref 26.8–34.3)
MCHC RBC AUTO-ENTMCNC: 32.9 G/DL (ref 31.4–37.4)
MCV RBC AUTO: 90 FL (ref 82–98)
PLATELET # BLD AUTO: 203 THOUSANDS/UL (ref 149–390)
PMV BLD AUTO: 10 FL (ref 8.9–12.7)
POTASSIUM SERPL-SCNC: 4.1 MMOL/L (ref 3.5–5.3)
PROCALCITONIN SERPL-MCNC: 0.06 NG/ML
PROT SERPL-MCNC: 7 G/DL (ref 6.4–8.4)
RBC # BLD AUTO: 5.26 MILLION/UL (ref 3.88–5.62)
SODIUM SERPL-SCNC: 135 MMOL/L (ref 135–147)
WBC # BLD AUTO: 13.66 THOUSAND/UL (ref 4.31–10.16)

## 2024-02-19 PROCEDURE — 99232 SBSQ HOSP IP/OBS MODERATE 35: CPT | Performed by: PHYSICIAN ASSISTANT

## 2024-02-19 PROCEDURE — 80053 COMPREHEN METABOLIC PANEL: CPT

## 2024-02-19 PROCEDURE — 84145 PROCALCITONIN (PCT): CPT | Performed by: PHYSICIAN ASSISTANT

## 2024-02-19 PROCEDURE — 85027 COMPLETE CBC AUTOMATED: CPT

## 2024-02-19 RX ORDER — AMOXICILLIN 250 MG
1 CAPSULE ORAL 2 TIMES DAILY
Status: DISCONTINUED | OUTPATIENT
Start: 2024-02-19 | End: 2024-02-21 | Stop reason: HOSPADM

## 2024-02-19 RX ADMIN — SODIUM CHLORIDE 125 ML/HR: 0.9 INJECTION, SOLUTION INTRAVENOUS at 07:57

## 2024-02-19 RX ADMIN — OXYCODONE HYDROCHLORIDE 5 MG: 5 TABLET ORAL at 20:58

## 2024-02-19 RX ADMIN — LISINOPRIL 10 MG: 10 TABLET ORAL at 10:02

## 2024-02-19 RX ADMIN — SODIUM CHLORIDE 125 ML/HR: 0.9 INJECTION, SOLUTION INTRAVENOUS at 15:19

## 2024-02-19 RX ADMIN — SENNOSIDES AND DOCUSATE SODIUM 1 TABLET: 50; 8.6 TABLET ORAL at 13:33

## 2024-02-19 RX ADMIN — POLYETHYLENE GLYCOL 3350 17 G: 17 POWDER, FOR SOLUTION ORAL at 10:02

## 2024-02-19 RX ADMIN — SODIUM CHLORIDE 1000 ML: 0.9 INJECTION, SOLUTION INTRAVENOUS at 13:33

## 2024-02-19 RX ADMIN — FENOFIBRATE 145 MG: 145 TABLET, FILM COATED ORAL at 10:02

## 2024-02-19 RX ADMIN — HYDROMORPHONE HYDROCHLORIDE 0.5 MG: 1 INJECTION, SOLUTION INTRAMUSCULAR; INTRAVENOUS; SUBCUTANEOUS at 16:31

## 2024-02-19 RX ADMIN — CEFTRIAXONE 2000 MG: 2 INJECTION, SOLUTION INTRAVENOUS at 15:20

## 2024-02-19 RX ADMIN — ONDANSETRON 4 MG: 2 INJECTION INTRAMUSCULAR; INTRAVENOUS at 20:58

## 2024-02-19 RX ADMIN — TAMSULOSIN HYDROCHLORIDE 0.4 MG: 0.4 CAPSULE ORAL at 15:30

## 2024-02-19 RX ADMIN — SENNOSIDES AND DOCUSATE SODIUM 1 TABLET: 50; 8.6 TABLET ORAL at 17:26

## 2024-02-19 RX ADMIN — AMLODIPINE BESYLATE 10 MG: 10 TABLET ORAL at 10:02

## 2024-02-19 NOTE — PROGRESS NOTES
"Progress Note - Urology      Patient: Jitendra Silva   : 1980 Sex: male   MRN: [id number]     CSN: [id number]  Unit/Bed#: [address] [address]     SUBJECTIVE:   Patient seen on morning rounds with mother present  Noting some mild left flank pain from the stent  No other urologic issues  Cardiac events will be kept an extra day we will see in the office next week for cystoscopy stent removal      Objective   Vitals: /88   Pulse 99   Temp 98.2 °F (36.8 °C) (Oral)   Resp 18   Ht 5' 8\" (1.727 m)   Wt 109 kg (241 lb)   SpO2 94%   BMI 36.64 kg/m²     I/O last 24 hours:  In: 1155 [P.O.:300; I.V.:755; IV Piggyback:100]  Out: 0       Physical Exam:   General Alert awake   Normocephalic atraumatic PERRLA  Lungs clear bilaterally  Cardiac normal S1 normal S2  Abdomen soft, flank pain  Extremities no edema      Lab Results: CBC:   Lab Results   Component Value Date    WBC 13.66 (H) 2024    HGB 15.5 2024    HCT 47.1 2024    MCV 90 2024     2024    RBC 5.26 2024    MCH 29.5 2024    MCHC 32.9 2024    RDW 12.5 2024    MPV 10.0 2024    NRBC 0 2024     CMP:   Lab Results   Component Value Date     2024    CO2 25 2024    BUN 17 2024    CREATININE 1.14 2024    CALCIUM 8.4 2024    AST 23 2024    ALT 51 2024    ALKPHOS 89 2024    EGFR 77 2024     Urinalysis:   Lab Results   Component Value Date    COLORU Yellow 2024    CLARITYU Clear 2024    SPECGRAV 1.015 2024    PHUR 7.0 2024    LEUKOCYTESUR Negative 2024    NITRITE Negative 2024    GLUCOSEU Negative 2024    KETONESU Negative 2024    BILIRUBINUR Negative 2024    BLOODU Moderate (A) 2024     Urine Culture:   Lab Results   Component Value Date    URINECX No Growth <1000 cfu/mL 2024     PSA: No results found for: \"PSA\"      Assessment/ Plan:  Status post cystoscopy left " ureteroscopy basket extraction and stent placement day #1  Patient urologically stable but being kept by medical staff for tachycardia stent to be removed in office in a few days          Trevon Pelayo MD

## 2024-02-19 NOTE — PROGRESS NOTES
"[facility]  Progress Note  Name: Jitendra Silva I  MRN: [id number]  Unit/Bed#: [address] [place] [address] Date of Admission: 2/17/2024   Date of Service: 2/19/2024 I Hospital Day: 1    Assessment/Plan   * Calculus of ureterovesical junction (UVJ)  Assessment & Plan  POA, left flank pain w/ 2 mm left UVJ calculus causing mild hydroureteronephrosis    Recent Labs     02/17/24  1128 02/18/24  0633 02/19/24  0538   CREATININE 1.23 1.37* 1.14     Urology consulted; POD # s/p stone extraction and left urethral stent placement   Per op note, \"2 mm left distal ureterovesical junction stone fragmented into 1 mm pieces when the basket removed it unable to send specimen 24 cm 6 Egyptian stent passed\"  Stent removal scheduled as outpatient on 02/21/24  Creatinine stabilized; WBC trending downward  W/ tachycardia, provide IVF bolus & maintenance fluids  Continue Flomax  Ucx w/o growth   Pain and nausea well-controlled    Sepsis (HCC)  Assessment & Plan  Presumed sepsis on admission evidenced by tachycardia & leukocytosis in setting of suspected cystitis  CT abd/pelvis (02/17):   2 mm left UVJ calculus causing mild hydroureteronephrosis. Asymmetric left perinephric edema, presumably reactive  LA 1.9  BCx x 1 set w/ staphylococcus; second set w/o growth x 24 hrs   Ucx w/ < 1000 CFU   WBC improving; continue IVFs   Curbside consult w/ ID; SIRS 2/2 to stone & hydronephrosis without definite infection (no UTI)  Check & trend procal   Continue Rocephin through tomorrow; if stable, d/c ABX  Blood cultures suspected to be contaminant; no need for repeat    Recent Labs     02/17/24  1128 02/18/24  0632 02/19/24  0538   WBC 17.33* 17.06* 13.66*         Constipation  Assessment & Plan  Last BM 3 days ago  Increase bowel regiment    Elevated hemoglobin (HCC)  Assessment & Plan  Follows with hematology and rheumatology  At baseline    Recent Labs     02/17/24  1128 02/18/24  0632 02/19/24  0538   HGB 17.7* 16.7 15.5    "     Primary hypertension  Assessment & Plan  Continue amlodipine 10 mg daily & lisinopril 10 mg daily  BP acceptable    Dyslipidemia  Assessment & Plan  Continue fenofibrate         VTE Pharmacologic Prophylaxis: VTE Score: 3 continue heparin and sequential compression devices    Mobility:   Basic Mobility Inpatient Raw Score: 24  JH-HLM Goal: 8: Walk 250 feet or more  JH-HLM Achieved: 8: Walk 250 feet ot more  HLM Goal achieved. Continue to encourage appropriate mobility.    Patient Centered Rounds: I performed bedside rounds with nursing staff today.   Discussions with Specialists or Other Care Team Provider: Case management; urology following    Education and Discussions with Family / Patient: Updated  (mother) at bedside.    Total Time Spent on Date of Encounter in care of patient: 50 mins. This time was spent on one or more of the following: performing physical exam; counseling and coordination of care; obtaining or reviewing history; documenting in the medical record; reviewing/ordering tests, medications or procedures; communicating with other healthcare professionals and discussing with patient's family/caregivers.    Current Length of Stay: 1 day(s)  Current Patient Status: Inpatient   Certification Statement: The patient will continue to require additional inpatient hospital stay due to kidney stone with active SIRS criteria  Discharge Plan: Anticipate discharge tomorrow to home.    Code Status: Level 1 - Full Code    Subjective:   Patient is doing okay.  He denies current abdominal pain.  He was having dysuria with urination initially after the procedure which has since stopped.  He ate his entire breakfast.  He notes his last bowel movement was 3 days ago.  He denies fever or chills.  He denies chest pain or shortness of breath.    Objective:     Vitals:   Temp (24hrs), Av.6 °F (37 °C), Min:97.2 °F (36.2 °C), Max:100.4 °F (38 °C)    Temp:  [97.2 °F (36.2 °C)-100.4 °F (38 °C)] 98.1 °F  (36.7 °C)  HR:  [] 101  Resp:  [14-20] 18  BP: (111-143)/(60-98) 143/97  SpO2:  [91 %-98 %] 94 %  Body mass index is 36.64 kg/m².     Input and Output Summary (last 24 hours):     Intake/Output Summary (Last 24 hours) at 2/19/2024 1234  Last data filed at 2/19/2024 0900  Gross per 24 hour   Intake 1155 ml   Output 0 ml   Net 1155 ml       Physical Exam:   Physical Exam  Constitutional:       General: He is not in acute distress.     Appearance: He is not ill-appearing or toxic-appearing.      Comments: Overweight   HENT:      Head: Normocephalic.      Right Ear: External ear normal.      Left Ear: External ear normal.      Nose: Nose normal.      Mouth/Throat:      Mouth: Mucous membranes are moist.      Pharynx: Oropharynx is clear.   Eyes:      Extraocular Movements: Extraocular movements intact.      Conjunctiva/sclera: Conjunctivae normal.   Cardiovascular:      Rate and Rhythm: Regular rhythm. Tachycardia present.   Pulmonary:      Effort: Pulmonary effort is normal.      Breath sounds: Normal breath sounds.   Abdominal:      General: Abdomen is flat. Bowel sounds are normal. There is no distension.      Palpations: Abdomen is soft.      Tenderness: There is no abdominal tenderness. There is no guarding.   Genitourinary:     Comments: No CVA tenderness  Musculoskeletal:         General: No swelling. Normal range of motion.      Cervical back: Normal range of motion.   Skin:     General: Skin is warm and dry.   Neurological:      General: No focal deficit present.      Mental Status: He is alert. Mental status is at baseline.   Psychiatric:         Mood and Affect: Mood normal.         Behavior: Behavior normal.         Additional Data:     Labs:  Results from last 7 days   Lab Units 02/19/24  0538 02/18/24  0632 02/17/24  1128   WBC Thousand/uL 13.66*   < > 17.33*   HEMOGLOBIN g/dL 15.5   < > 17.7*   HEMATOCRIT % 47.1   < > 51.2*   PLATELETS Thousands/uL 203   < > 217   NEUTROS PCT %  --   --  85*    LYMPHS PCT %  --   --  8*   MONOS PCT %  --   --  6   EOS PCT %  --   --  0    < > = values in this interval not displayed.     Results from last 7 days   Lab Units 02/19/24  0538   SODIUM mmol/L 135   POTASSIUM mmol/L 4.1   CHLORIDE mmol/L 101   CO2 mmol/L 25   BUN mg/dL 17   CREATININE mg/dL 1.14   ANION GAP mmol/L 9   CALCIUM mg/dL 8.4   ALBUMIN g/dL 4.1   TOTAL BILIRUBIN mg/dL 0.69   ALK PHOS U/L 89   ALT U/L 51   AST U/L 23   GLUCOSE RANDOM mg/dL 204*                 Results from last 7 days   Lab Units 02/17/24  1501   LACTIC ACID mmol/L 1.9       Lines/Drains:  Invasive Devices       Peripheral Intravenous Line  Duration             Peripheral IV 02/17/24 Left Antecubital 2 days                          Imaging: Reviewed radiology reports from this admission including: abdominal/pelvic CT    Recent Cultures (last 7 days):   Results from last 7 days   Lab Units 02/17/24  1512 02/17/24  1133   BLOOD CULTURE  No Growth at 24 hrs.  --    GRAM STAIN RESULT  Gram negative rods*  --    URINE CULTURE   --  No Growth <1000 cfu/mL       Last 24 Hours Medication List:   Current Facility-Administered Medications   Medication Dose Route Frequency Provider Last Rate    acetaminophen  650 mg Oral Q6H PRN Tara Gomez PA-C      amLODIPine  10 mg Oral Daily Tara Gomez PA-C      bisacodyl  10 mg Rectal Daily PRN NETTA German      cefTRIAXone  2,000 mg Intravenous Q24H AngelaiNETTA Henriquez      fenofibrate  145 mg Oral Daily Tara Gomez PA-C      heparin (porcine)  5,000 Units Subcutaneous Q8H [facility] Tara Gomez PA-C      HYDROmorphone  0.5 mg Intravenous Q3H PRN NETTA German      HYDROmorphone  1 mg Intravenous Q6H PRN AngelaiNETTA Henriquez      lisinopril  10 mg Oral Daily Tara Gomez PA-C      ondansetron  4 mg Intravenous Q6H PRN Tara Gomez PA-C      oxyCODONE  5 mg Oral Q4H PRN NETTA German      polyethylene glycol  17 g Oral Daily Cuiyin NETTA Cordero-docusate sodium  1 tablet Oral BID  Sabina Miner PA-C      sodium chloride  1,000 mL Intravenous Once Sabina Miner PA-C      sodium chloride  125 mL/hr Intravenous Continuous Tara Gomez PA-C 125 mL/hr (02/19/24 0921)    tamsulosin  0.4 mg Oral Daily With Dinner Tara Gomez PA-C          Today, Patient Was Seen By: Sabina Miner PA-C    **Please Note: This note may have been constructed using a voice recognition system.**

## 2024-02-20 LAB
ANION GAP SERPL CALCULATED.3IONS-SCNC: 6 MMOL/L
ATRIAL RATE: 108 BPM
BUN SERPL-MCNC: 15 MG/DL (ref 5–25)
CALCIUM SERPL-MCNC: 8.6 MG/DL (ref 8.4–10.2)
CHLORIDE SERPL-SCNC: 105 MMOL/L (ref 96–108)
CO2 SERPL-SCNC: 29 MMOL/L (ref 21–32)
CREAT SERPL-MCNC: 1.01 MG/DL (ref 0.6–1.3)
ERYTHROCYTE [DISTWIDTH] IN BLOOD BY AUTOMATED COUNT: 13 % (ref 11.6–15.1)
GFR SERPL CREATININE-BSD FRML MDRD: 90 ML/MIN/1.73SQ M
GLUCOSE SERPL-MCNC: 100 MG/DL (ref 65–140)
GLUCOSE SERPL-MCNC: 142 MG/DL (ref 65–140)
HCT VFR BLD AUTO: 49 % (ref 36.5–49.3)
HGB BLD-MCNC: 15.8 G/DL (ref 12–17)
MCH RBC QN AUTO: 29.5 PG (ref 26.8–34.3)
MCHC RBC AUTO-ENTMCNC: 32.2 G/DL (ref 31.4–37.4)
MCV RBC AUTO: 91 FL (ref 82–98)
P AXIS: 48 DEGREES
PLATELET # BLD AUTO: 236 THOUSANDS/UL (ref 149–390)
PMV BLD AUTO: 10.1 FL (ref 8.9–12.7)
POTASSIUM SERPL-SCNC: 4.3 MMOL/L (ref 3.5–5.3)
PR INTERVAL: 148 MS
PROCALCITONIN SERPL-MCNC: 0.05 NG/ML
QRS AXIS: 28 DEGREES
QRSD INTERVAL: 84 MS
QT INTERVAL: 350 MS
QTC INTERVAL: 469 MS
RBC # BLD AUTO: 5.36 MILLION/UL (ref 3.88–5.62)
SODIUM SERPL-SCNC: 140 MMOL/L (ref 135–147)
T WAVE AXIS: 0 DEGREES
VENTRICULAR RATE: 108 BPM
WBC # BLD AUTO: 18.61 THOUSAND/UL (ref 4.31–10.16)

## 2024-02-20 PROCEDURE — 80048 BASIC METABOLIC PNL TOTAL CA: CPT | Performed by: PHYSICIAN ASSISTANT

## 2024-02-20 PROCEDURE — 84145 PROCALCITONIN (PCT): CPT | Performed by: PHYSICIAN ASSISTANT

## 2024-02-20 PROCEDURE — 85027 COMPLETE CBC AUTOMATED: CPT | Performed by: PHYSICIAN ASSISTANT

## 2024-02-20 PROCEDURE — 93010 ELECTROCARDIOGRAM REPORT: CPT | Performed by: INTERNAL MEDICINE

## 2024-02-20 PROCEDURE — 99232 SBSQ HOSP IP/OBS MODERATE 35: CPT | Performed by: INTERNAL MEDICINE

## 2024-02-20 PROCEDURE — 82948 REAGENT STRIP/BLOOD GLUCOSE: CPT

## 2024-02-20 PROCEDURE — 93005 ELECTROCARDIOGRAM TRACING: CPT

## 2024-02-20 RX ORDER — MAGNESIUM HYDROXIDE/ALUMINUM HYDROXICE/SIMETHICONE 120; 1200; 1200 MG/30ML; MG/30ML; MG/30ML
30 SUSPENSION ORAL EVERY 4 HOURS PRN
Status: DISCONTINUED | OUTPATIENT
Start: 2024-02-20 | End: 2024-02-21 | Stop reason: HOSPADM

## 2024-02-20 RX ORDER — PHENAZOPYRIDINE HYDROCHLORIDE 100 MG/1
100 TABLET, FILM COATED ORAL
Status: DISCONTINUED | OUTPATIENT
Start: 2024-02-20 | End: 2024-02-21 | Stop reason: HOSPADM

## 2024-02-20 RX ORDER — HYDROXYZINE HYDROCHLORIDE 25 MG/1
25 TABLET, FILM COATED ORAL EVERY 6 HOURS PRN
Status: DISCONTINUED | OUTPATIENT
Start: 2024-02-20 | End: 2024-02-21 | Stop reason: HOSPADM

## 2024-02-20 RX ADMIN — FENOFIBRATE 145 MG: 145 TABLET, FILM COATED ORAL at 08:08

## 2024-02-20 RX ADMIN — OXYCODONE HYDROCHLORIDE 5 MG: 5 TABLET ORAL at 22:21

## 2024-02-20 RX ADMIN — TAMSULOSIN HYDROCHLORIDE 0.4 MG: 0.4 CAPSULE ORAL at 18:52

## 2024-02-20 RX ADMIN — HYDROMORPHONE HYDROCHLORIDE 0.5 MG: 1 INJECTION, SOLUTION INTRAMUSCULAR; INTRAVENOUS; SUBCUTANEOUS at 08:09

## 2024-02-20 RX ADMIN — SENNOSIDES AND DOCUSATE SODIUM 1 TABLET: 50; 8.6 TABLET ORAL at 18:52

## 2024-02-20 RX ADMIN — SODIUM CHLORIDE 125 ML/HR: 0.9 INJECTION, SOLUTION INTRAVENOUS at 00:10

## 2024-02-20 RX ADMIN — HYDROMORPHONE HYDROCHLORIDE 0.5 MG: 1 INJECTION, SOLUTION INTRAMUSCULAR; INTRAVENOUS; SUBCUTANEOUS at 04:49

## 2024-02-20 RX ADMIN — PHENAZOPYRIDINE 100 MG: 100 TABLET ORAL at 12:02

## 2024-02-20 RX ADMIN — HYDROMORPHONE HYDROCHLORIDE 0.5 MG: 1 INJECTION, SOLUTION INTRAMUSCULAR; INTRAVENOUS; SUBCUTANEOUS at 18:52

## 2024-02-20 RX ADMIN — AMLODIPINE BESYLATE 10 MG: 10 TABLET ORAL at 08:08

## 2024-02-20 RX ADMIN — SENNOSIDES AND DOCUSATE SODIUM 1 TABLET: 50; 8.6 TABLET ORAL at 08:08

## 2024-02-20 RX ADMIN — PHENAZOPYRIDINE 100 MG: 100 TABLET ORAL at 18:52

## 2024-02-20 RX ADMIN — ALUMINUM HYDROXIDE, MAGNESIUM HYDROXIDE, AND DIMETHICONE 30 ML: 200; 20; 200 SUSPENSION ORAL at 13:42

## 2024-02-20 RX ADMIN — LISINOPRIL 10 MG: 10 TABLET ORAL at 08:08

## 2024-02-20 RX ADMIN — POLYETHYLENE GLYCOL 3350 17 G: 17 POWDER, FOR SOLUTION ORAL at 08:09

## 2024-02-20 RX ADMIN — SODIUM CHLORIDE 125 ML/HR: 0.9 INJECTION, SOLUTION INTRAVENOUS at 08:10

## 2024-02-20 NOTE — PLAN OF CARE
Problem: GENITOURINARY - ADULT  Goal: Maintains or returns to baseline urinary function  Description: INTERVENTIONS:  - Assess urinary function  - Encourage oral fluids to ensure adequate hydration if ordered  - Administer IV fluids as ordered to ensure adequate hydration  - Administer ordered medications as needed  - Offer frequent toileting  - Follow urinary retention protocol if ordered  Outcome: Progressing  Goal: Absence of urinary retention  Description: INTERVENTIONS:  - Assess patient's ability to void and empty bladder  - Monitor I/O  - Bladder scan as needed  - Discuss with physician/AP medications to alleviate retention as needed  - Discuss catheterization for long term situations as appropriate  Outcome: Progressing     Problem: PAIN - ADULT  Goal: Verbalizes/displays adequate comfort level or baseline comfort level  Description: Interventions:  - Encourage patient to monitor pain and request assistance  - Assess pain using appropriate pain scale  - Administer analgesics based on type and severity of pain and evaluate response  - Implement non-pharmacological measures as appropriate and evaluate response  - Consider cultural and social influences on pain and pain management  - Notify physician/advanced practitioner if interventions unsuccessful or patient reports new pain  Outcome: Progressing     Problem: Knowledge Deficit  Goal: Patient/family/caregiver demonstrates understanding of disease process, treatment plan, medications, and discharge instructions  Description: Complete learning assessment and assess knowledge base.  Interventions:  - Provide teaching at level of understanding  - Provide teaching via preferred learning methods  Outcome: Progressing

## 2024-02-20 NOTE — PLAN OF CARE
Problem: GENITOURINARY - ADULT  Goal: Maintains or returns to baseline urinary function  Description: INTERVENTIONS:  - Assess urinary function  - Encourage oral fluids to ensure adequate hydration if ordered  - Administer IV fluids as ordered to ensure adequate hydration  - Administer ordered medications as needed  - Offer frequent toileting  - Follow urinary retention protocol if ordered  Outcome: Progressing     Problem: PAIN - ADULT  Goal: Verbalizes/displays adequate comfort level or baseline comfort level  Description: Interventions:  - Encourage patient to monitor pain and request assistance  - Assess pain using appropriate pain scale  - Administer analgesics based on type and severity of pain and evaluate response  - Implement non-pharmacological measures as appropriate and evaluate response  - Consider cultural and social influences on pain and pain management  - Notify physician/advanced practitioner if interventions unsuccessful or patient reports new pain  Outcome: Progressing     Problem: Knowledge Deficit  Goal: Patient/family/caregiver demonstrates understanding of disease process, treatment plan, medications, and discharge instructions  Description: Complete learning assessment and assess knowledge base.  Interventions:  - Provide teaching at level of understanding  - Provide teaching via preferred learning methods  Outcome: Progressing

## 2024-02-20 NOTE — PROGRESS NOTES
"Progress Note - Urology      Patient: Jitendra Silva   : 1980 Sex: male   MRN: [id number]     CSN: [id number]  Unit/Bed#: [address] [place] [address]     SUBJECTIVE:   Patient seen on morning rounds  Antibiotics for possible complicated UTI?  Blood and urine cultures negative  On Rocephin    Objective   Vitals: BP (!) 155/105   Pulse 82   Temp 97.5 °F (36.4 °C)   Resp 16   Ht 5' 8\" (1.727 m)   Wt 109 kg (241 lb)   SpO2 96%   BMI 36.64 kg/m²     I/O last 24 hours:  In: 950 [P.O.:300; I.V.:600; IV Piggyback:50]  Out: -       Physical Exam:   General Alert awake   Normocephalic atraumatic PERRLA  Lungs clear bilaterally  Cardiac normal S1 normal S2  Abdomen soft, flank pain  Extremities no edema      Lab Results: CBC:   Lab Results   Component Value Date    WBC 18.61 (H) 2024    HGB 15.8 2024    HCT 49.0 2024    MCV 91 2024     2024    RBC 5.36 2024    MCH 29.5 2024    MCHC 32.2 2024    RDW 13.0 2024    MPV 10.1 2024    NRBC 0 2024     CMP:   Lab Results   Component Value Date     2024    CO2 29 2024    BUN 15 2024    CREATININE 1.01 2024    CALCIUM 8.6 2024    AST 23 2024    ALT 51 2024    ALKPHOS 89 2024    EGFR 90 2024     Urinalysis:   Lab Results   Component Value Date    COLORU Yellow 2024    CLARITYU Clear 2024    SPECGRAV 1.015 2024    PHUR 7.0 2024    LEUKOCYTESUR Negative 2024    NITRITE Negative 2024    GLUCOSEU Negative 2024    KETONESU Negative 2024    BILIRUBINUR Negative 2024    BLOODU Moderate (A) 2024     Urine Culture:   Lab Results   Component Value Date    URINECX No Growth <1000 cfu/mL 2024     PSA: No results found for: \"PSA\"      Assessment/ Plan:  Status post cystoscopy left ureteroscopy basket traction stent placement day #2  Mild left stent discomfort  Patient be discharged home and seen in the " office next week for cystoscopy stent removal          Trevon Pelayo MD

## 2024-02-20 NOTE — PROGRESS NOTES
"[facility]  Progress Note  Name: Jitendra Silva I  MRN: [id number]  Unit/Bed#: 2 [address] Date of Admission: 2/17/2024   Date of Service: 2/20/2024 I Hospital Day: 2    Assessment/Plan   * Calculus of ureterovesical junction (UVJ)  Assessment & Plan  POA, left flank pain w/ 2 mm left UVJ calculus causing mild hydroureteronephrosis    Recent Labs     02/18/24  0633 02/19/24  0538 02/20/24  0457   CREATININE 1.37* 1.14 1.01       Urology consulted; POD #2 s/p stone extraction and left urethral stent placement   Per op note, \"2 mm left distal ureterovesical junction stone fragmented into 1 mm pieces when the basket removed it unable to send specimen 24 cm 6 Bulgarian stent passed\"  Has appointment for 2/21/24 for stent removal  Creatinine stabilized  W/ tachycardia, provide IVF bolus & maintenance fluids  Continue Flomax  Ucx w/o growth   Patient with increased left flank pain overnight and this AM. Pyridium added for spasms. PRN pain medications. Continue to monitor.     Sepsis (HCC)  Assessment & Plan  Presumed sepsis on admission evidenced by tachycardia & leukocytosis in setting of suspected cystitis  CT abd/pelvis (02/17):   2 mm left UVJ calculus causing mild hydroureteronephrosis. Asymmetric left perinephric edema, presumably reactive  LA 1.9  BCx x 1 set w/ staphylococcus; second set w/o growth x 48 hrs   Ucx w/ < 1000 CFU   WBC improving; continue IVFs   Curbside consult w/ ID; SIRS 2/2 to stone & hydronephrosis without definite infection (no UTI)  Procalcitonin negative  Rise in WBC 18K, likely reactive in the setting of recent stent  Discontinue ABX today  Blood cultures suspected to be contaminant; no need for repeat    Recent Labs     02/18/24  0632 02/19/24  0538 02/20/24  0457   WBC 17.06* 13.66* 18.61*           Constipation  Assessment & Plan  Last BM 3 days ago  Increase bowel regiment    Elevated hemoglobin (HCC)  Assessment & Plan  Follows with hematology and " rheumatology  At baseline    Recent Labs     24  0632 24  0538 24  0457   HGB 16.7 15.5 15.8          Primary hypertension  Assessment & Plan  Continue amlodipine 10 mg daily & lisinopril 10 mg daily  BP acceptable    Dyslipidemia  Assessment & Plan  Continue fenofibrate             VTE Pharmacologic Prophylaxis: VTE Score: 3 Moderate Risk (Score 3-4) - Pharmacological DVT Prophylaxis Ordered: heparin.    Mobility:   Basic Mobility Inpatient Raw Score: 24  JH-HLM Goal: 8: Walk 250 feet or more  JH-HLM Achieved: 8: Walk 250 feet ot more  HLM Goal achieved. Continue to encourage appropriate mobility.    Patient Centered Rounds: I performed bedside rounds with nursing staff today.   Discussions with Specialists or Other Care Team Provider: Nursing, Urology    Education and Discussions with Family / Patient: Updated  (mother) via phone.    Total Time Spent on Date of Encounter in care of patient: 45 mins. This time was spent on one or more of the following: performing physical exam; counseling and coordination of care; obtaining or reviewing history; documenting in the medical record; reviewing/ordering tests, medications or procedures; communicating with other healthcare professionals and discussing with patient's family/caregivers.    Current Length of Stay: 2 day(s)  Current Patient Status: Inpatient   Certification Statement: The patient will continue to require additional inpatient hospital stay due to left flank pain  Discharge Plan: Anticipate discharge tomorrow to home.    Code Status: Level 1 - Full Code    Subjective:   Patient reports increasing left flank pain after urination starting overnight and this AM    Objective:     Vitals:   Temp (24hrs), Av.8 °F (36.6 °C), Min:97.5 °F (36.4 °C), Max:98.2 °F (36.8 °C)    Temp:  [97.5 °F (36.4 °C)-98.2 °F (36.8 °C)] 97.5 °F (36.4 °C)  HR:  [] 82  Resp:  [16-18] 16  BP: (136-155)/() 155/105  SpO2:  [93 %-96 %] 96 %  Body  mass index is 36.64 kg/m².     Input and Output Summary (last 24 hours):     Intake/Output Summary (Last 24 hours) at 2/20/2024 1110  Last data filed at 2/20/2024 0808  Gross per 24 hour   Intake 1610 ml   Output --   Net 1610 ml       Physical Exam:   Physical Exam  Vitals and nursing note reviewed.   Constitutional:       General: He is not in acute distress.     Appearance: He is well-developed.   HENT:      Head: Normocephalic and atraumatic.   Eyes:      Conjunctiva/sclera: Conjunctivae normal.   Cardiovascular:      Rate and Rhythm: Normal rate and regular rhythm.      Heart sounds: No murmur heard.  Pulmonary:      Effort: Pulmonary effort is normal. No respiratory distress.      Breath sounds: Normal breath sounds.   Abdominal:      Palpations: Abdomen is soft.      Tenderness: There is no abdominal tenderness.   Musculoskeletal:         General: No swelling.      Cervical back: Neck supple.   Skin:     General: Skin is warm and dry.      Capillary Refill: Capillary refill takes less than 2 seconds.   Neurological:      Mental Status: He is alert. Mental status is at baseline.   Psychiatric:         Mood and Affect: Mood normal.          Additional Data:     Labs:  Results from last 7 days   Lab Units 02/20/24  0457 02/18/24  0632 02/17/24  1128   WBC Thousand/uL 18.61*   < > 17.33*   HEMOGLOBIN g/dL 15.8   < > 17.7*   HEMATOCRIT % 49.0   < > 51.2*   PLATELETS Thousands/uL 236   < > 217   NEUTROS PCT %  --   --  85*   LYMPHS PCT %  --   --  8*   MONOS PCT %  --   --  6   EOS PCT %  --   --  0    < > = values in this interval not displayed.     Results from last 7 days   Lab Units 02/20/24  0457 02/19/24  0538   SODIUM mmol/L 140 135   POTASSIUM mmol/L 4.3 4.1   CHLORIDE mmol/L 105 101   CO2 mmol/L 29 25   BUN mg/dL 15 17   CREATININE mg/dL 1.01 1.14   ANION GAP mmol/L 6 9   CALCIUM mg/dL 8.6 8.4   ALBUMIN g/dL  --  4.1   TOTAL BILIRUBIN mg/dL  --  0.69   ALK PHOS U/L  --  89   ALT U/L  --  51   AST U/L  --   23   GLUCOSE RANDOM mg/dL 100 204*                 Results from last 7 days   Lab Units 02/20/24  0457 02/19/24  1327 02/17/24  1501   LACTIC ACID mmol/L  --   --  1.9   PROCALCITONIN ng/ml 0.05 0.06  --        Lines/Drains:  Invasive Devices       Peripheral Intravenous Line  Duration             Peripheral IV 02/17/24 Left Antecubital 2 days                          Imaging: No pertinent imaging reviewed.    Recent Cultures (last 7 days):   Results from last 7 days   Lab Units 02/17/24  1512 02/17/24  1133   BLOOD CULTURE  No Growth at 48 hrs.  --    GRAM STAIN RESULT  Gram negative rods*  --    URINE CULTURE   --  No Growth <1000 cfu/mL       Last 24 Hours Medication List:   Current Facility-Administered Medications   Medication Dose Route Frequency Provider Last Rate    acetaminophen  650 mg Oral Q6H PRN Tara Gomez PA-C      amLODIPine  10 mg Oral Daily Tara Gomez PA-C      bisacodyl  10 mg Rectal Daily PRN NETTA German      fenofibrate  145 mg Oral Daily Tara Gomez PA-C      heparin (porcine)  5,000 Units Subcutaneous Q8H [facility] Tara Gomez PA-C      HYDROmorphone  0.5 mg Intravenous Q3H PRN Hernán Cordero, NETTA      HYDROmorphone  1 mg Intravenous Q6H PRN Angelaibarrera Cordero, NETTA      lisinopril  10 mg Oral Daily Tara Gomez PA-C      ondansetron  4 mg Intravenous Q6H PRN Tara Gomez PA-C      oxyCODONE  5 mg Oral Q4H PRN NETTA German      phenazopyridine  100 mg Oral TID With Meals Trevon Pelayo MD      polyethylene glycol  17 g Oral Daily CuiNETTA Henriquez      senna-docusate sodium  1 tablet Oral BID Sabina Miner PA-C      sodium chloride  125 mL/hr Intravenous Continuous Tara Gomez PA-C 125 mL/hr (02/20/24 0810)    tamsulosin  0.4 mg Oral Daily With Dinner Tara Gomez PA-C          Today, Patient Was Seen By: Deann Henderson PA-C    **Please Note: This note may have been constructed using a voice recognition system.**

## 2024-02-20 NOTE — PLAN OF CARE
Problem: GENITOURINARY - ADULT  Goal: Maintains or returns to baseline urinary function  Description: INTERVENTIONS:  - Assess urinary function  - Encourage oral fluids to ensure adequate hydration if ordered  - Administer IV fluids as ordered to ensure adequate hydration  - Administer ordered medications as needed  - Offer frequent toileting  - Follow urinary retention protocol if ordered  2/20/2024 0729 by Taylor Carranza RN  Outcome: Progressing    Problem: PAIN - ADULT  Goal: Verbalizes/displays adequate comfort level or baseline comfort level  Description: Interventions:  - Encourage patient to monitor pain and request assistance  - Assess pain using appropriate pain scale  - Administer analgesics based on type and severity of pain and evaluate response  - Implement non-pharmacological measures as appropriate and evaluate response  - Consider cultural and social influences on pain and pain management  - Notify physician/advanced practitioner if interventions unsuccessful or patient reports new pain  2/20/2024 0729 by Taylor Carranza RN  Outcome: Progressing    Problem: Knowledge Deficit  Goal: Patient/family/caregiver demonstrates understanding of disease process, treatment plan, medications, and discharge instructions  Description: Complete learning assessment and assess knowledge base.  Interventions:  - Provide teaching at level of understanding  - Provide teaching via preferred learning methods  2/20/2024 0729 by Taylor Carranza RN  Outcome: Progressing

## 2024-02-21 VITALS
RESPIRATION RATE: 18 BRPM | HEIGHT: 68 IN | DIASTOLIC BLOOD PRESSURE: 103 MMHG | SYSTOLIC BLOOD PRESSURE: 151 MMHG | TEMPERATURE: 97.8 F | OXYGEN SATURATION: 94 % | WEIGHT: 241 LBS | HEART RATE: 107 BPM | BODY MASS INDEX: 36.53 KG/M2

## 2024-02-21 PROBLEM — K59.00 CONSTIPATION: Status: RESOLVED | Noted: 2024-02-19 | Resolved: 2024-02-21

## 2024-02-21 PROBLEM — A41.9 SEPSIS (HCC): Status: RESOLVED | Noted: 2024-02-17 | Resolved: 2024-02-21

## 2024-02-21 PROBLEM — N20.1 CALCULUS OF URETEROVESICAL JUNCTION (UVJ): Status: RESOLVED | Noted: 2024-02-17 | Resolved: 2024-02-21

## 2024-02-21 LAB
ANION GAP SERPL CALCULATED.3IONS-SCNC: 8 MMOL/L
BACTERIA BLD CULT: ABNORMAL
BACTERIA BLD CULT: ABNORMAL
BUN SERPL-MCNC: 15 MG/DL (ref 5–25)
CALCIUM SERPL-MCNC: 8.5 MG/DL (ref 8.4–10.2)
CHLORIDE SERPL-SCNC: 101 MMOL/L (ref 96–108)
CO2 SERPL-SCNC: 29 MMOL/L (ref 21–32)
CREAT SERPL-MCNC: 0.97 MG/DL (ref 0.6–1.3)
ERYTHROCYTE [DISTWIDTH] IN BLOOD BY AUTOMATED COUNT: 12.7 % (ref 11.6–15.1)
GFR SERPL CREATININE-BSD FRML MDRD: 94 ML/MIN/1.73SQ M
GLUCOSE SERPL-MCNC: 92 MG/DL (ref 65–140)
GRAM STN SPEC: ABNORMAL
HCT VFR BLD AUTO: 48.9 % (ref 36.5–49.3)
HGB BLD-MCNC: 16.4 G/DL (ref 12–17)
MCH RBC QN AUTO: 29.7 PG (ref 26.8–34.3)
MCHC RBC AUTO-ENTMCNC: 33.5 G/DL (ref 31.4–37.4)
MCV RBC AUTO: 88 FL (ref 82–98)
PLATELET # BLD AUTO: 262 THOUSANDS/UL (ref 149–390)
PMV BLD AUTO: 9.7 FL (ref 8.9–12.7)
POTASSIUM SERPL-SCNC: 3.4 MMOL/L (ref 3.5–5.3)
RBC # BLD AUTO: 5.53 MILLION/UL (ref 3.88–5.62)
S AUREUS+CONS DNA BLD POS NAA+NON-PROBE: DETECTED
SODIUM SERPL-SCNC: 138 MMOL/L (ref 135–147)
WBC # BLD AUTO: 13.05 THOUSAND/UL (ref 4.31–10.16)

## 2024-02-21 PROCEDURE — 85027 COMPLETE CBC AUTOMATED: CPT | Performed by: INTERNAL MEDICINE

## 2024-02-21 PROCEDURE — 99239 HOSP IP/OBS DSCHRG MGMT >30: CPT | Performed by: INTERNAL MEDICINE

## 2024-02-21 PROCEDURE — 80048 BASIC METABOLIC PNL TOTAL CA: CPT | Performed by: INTERNAL MEDICINE

## 2024-02-21 RX ORDER — CEPHALEXIN 500 MG/1
500 CAPSULE ORAL EVERY 12 HOURS SCHEDULED
Qty: 10 CAPSULE | Refills: 0 | Status: SHIPPED | OUTPATIENT
Start: 2024-02-21 | End: 2024-02-26

## 2024-02-21 RX ORDER — TAMSULOSIN HYDROCHLORIDE 0.4 MG/1
0.4 CAPSULE ORAL
Qty: 30 CAPSULE | Refills: 0 | Status: SHIPPED | OUTPATIENT
Start: 2024-02-21 | End: 2024-03-22

## 2024-02-21 RX ORDER — PHENAZOPYRIDINE HYDROCHLORIDE 100 MG/1
100 TABLET, FILM COATED ORAL
Qty: 30 TABLET | Refills: 0 | Status: SHIPPED | OUTPATIENT
Start: 2024-02-21 | End: 2024-03-02

## 2024-02-21 RX ORDER — POTASSIUM CHLORIDE 20 MEQ/1
40 TABLET, EXTENDED RELEASE ORAL ONCE
Status: COMPLETED | OUTPATIENT
Start: 2024-02-21 | End: 2024-02-21

## 2024-02-21 RX ADMIN — OXYCODONE HYDROCHLORIDE 5 MG: 5 TABLET ORAL at 08:10

## 2024-02-21 RX ADMIN — AMLODIPINE BESYLATE 10 MG: 10 TABLET ORAL at 08:09

## 2024-02-21 RX ADMIN — SENNOSIDES AND DOCUSATE SODIUM 1 TABLET: 50; 8.6 TABLET ORAL at 08:10

## 2024-02-21 RX ADMIN — LISINOPRIL 10 MG: 10 TABLET ORAL at 08:10

## 2024-02-21 RX ADMIN — PHENAZOPYRIDINE 100 MG: 100 TABLET ORAL at 08:10

## 2024-02-21 RX ADMIN — POTASSIUM CHLORIDE 40 MEQ: 1500 TABLET, EXTENDED RELEASE ORAL at 08:09

## 2024-02-21 RX ADMIN — POLYETHYLENE GLYCOL 3350 17 G: 17 POWDER, FOR SOLUTION ORAL at 08:10

## 2024-02-21 RX ADMIN — OXYCODONE HYDROCHLORIDE 5 MG: 5 TABLET ORAL at 02:49

## 2024-02-21 RX ADMIN — FENOFIBRATE 145 MG: 145 TABLET, FILM COATED ORAL at 08:10

## 2024-02-21 NOTE — DISCHARGE SUMMARY
"[facility]  Discharge- Jitendra Silva 1980, 43 y.o. male MRN: [id number]  Unit/Bed#: 2 [address] Encounter: [id number]  Primary Care Provider: NETTA Felix   Date and time admitted to hospital: 2/17/2024 10:30 AM    * Calculus of ureterovesical junction (UVJ)  Assessment & Plan  POA, left flank pain w/ 2 mm left UVJ calculus causing mild hydroureteronephrosis    Recent Labs     02/18/24  0633 02/19/24  0538 02/20/24  0457   CREATININE 1.37* 1.14 1.01       Urology consulted; POD #2 s/p stone extraction and left urethral stent placement   Per op note, \"2 mm left distal ureterovesical junction stone fragmented into 1 mm pieces when the basket removed it unable to send specimen 24 cm 6 [place] stent passed\"  Reports ongoing flank pain but no worse than previous  Has appointment today 2/21/24 in Urology office for stent removal  Creatinine stabilized  Continue Flomax and pyridium  Ucx w/o growth   IV abx discontinued on 2/20/24  Follow up with Urology     Sepsis (HCC)  Assessment & Plan  Presumed sepsis on admission evidenced by tachycardia & leukocytosis in setting of suspected cystitis  CT abd/pelvis (02/17):   2 mm left UVJ calculus causing mild hydroureteronephrosis. Asymmetric left perinephric edema, presumably reactive  LA 1.9  BCx x 1 set w/ staphylococcus; second set w/o growth x 48 hrs   Ucx w/ < 1000 CFU   WBC improving; continue IVFs   Curbside consult w/ ID; SIRS 2/2 to stone & hydronephrosis without definite infection (no UTI)  Abx were discontinued 2/20/24  Procalcitonin negative  WBC improved today 13K  Blood cultures suspected to be contaminant; no need for repeat    Recent Labs     02/19/24  0538 02/20/24  0457 02/21/24  0457   WBC 13.66* 18.61* 13.05*           Constipation  Assessment & Plan  Increase bowel regiment  Serial abdominal exams    Elevated hemoglobin (HCC)  Assessment & Plan  Follows with hematology and rheumatology  At baseline    Recent Labs     " 02/19/24  0538 02/20/24  0457 02/21/24  0457   HGB 15.5 15.8 16.4          Primary hypertension  Assessment & Plan  Continue amlodipine 10 mg daily & lisinopril 10 mg daily  BP acceptable    Dyslipidemia  Assessment & Plan  Continue fenofibrate      Medical Problems       Resolved Problems  Date Reviewed: 2/21/2024   None       Discharging Physician / Practitioner: Deann Henderson PA-C  PCP: NETTA Felix  Admission Date:   Admission Orders (From admission, onward)       Ordered        02/18/24 1323  Inpatient Admission  Once            02/17/24 1551  Place in Observation  Once                          Discharge Date: 02/21/24    Consultations During Hospital Stay:  Urology    Procedures Performed:   (2/18/24): Cystoscopy ureteroscopy with basket stone extraction. Retrograde pyelogram and insertion stent ureteral    Significant Findings / Test Results:   CT A/P (2/17/24): 2 mm left UVJ calculus causing mild hydroureteronephrosis. Asymmetric left perinephric edema, presumably reactive. Fatty liver. Borderline prostatomegaly.  FL retrograde pyelogram (2/18/24): Fluoroscopic guidance provided for left retrograde pyelogram and ureteral stent placement. Please see procedure report for further details.     Incidental Findings:   None    Test Results Pending at Discharge (will require follow up):   None     Outpatient Tests Requested:  None    Complications:  None    Reason for Admission: Calculus of ureterovesical junction    Hospital Course:   Jitendra Silva is a 43 y.o. male patient who originally presented to the hospital on 2/17/2024 due to left flank pain and back pain on and off over the last few days.  Patient underwent CT abdomen pelvis which showed 2 mm left UVJ calculus causing mild hydroureteronephrosis.  Patient was consulted by urology and had cystoscopy with stone extraction and stent placement on 2/18/2024.  Patient also met sepsis criteria on admission.  Urine cultures came back negative for  "infection.  1 set of blood cultures positive for staph and the other negative x 72 hours.  Curb sided ID, positive blood culture likely a component of skin contaminant.  Patient's procalcitonin negative and white blood cell count downtrending.  He remains afebrile.  He received course of IV ceftriaxone, which was discontinued on 2/20/2024.  Patient reports ongoing left flank pain after stent placement, continue Flomax and Pyridium.  Patient eager for discharge today as he has an appointment with urologist office this afternoon for stent removal.  Patient to follow-up with Dr. Pelayo, the urologist after discharge.    Please see above list of diagnoses and related plan for additional information.     Condition at Discharge: stable    Discharge Day Visit / Exam:   Subjective:  Patient reports ongoing left flank pain but no worse than previous. He reports he would like to be discharged this AM as he has an appointment with Urology office this afternoon for stent removal.  Vitals: Blood Pressure: 130/79 (02/21/24 0003)  Pulse: 87 (02/21/24 0003)  Temperature: 98 °F (36.7 °C) (02/21/24 0003)  Temp Source: Oral (02/20/24 1531)  Respirations: 18 (02/21/24 0003)  Height: 5' 8\" (172.7 cm) (02/17/24 1713)  Weight - Scale: 109 kg (241 lb) (02/17/24 1713)  SpO2: 93 % (02/21/24 0003)  Exam:   Physical Exam  Vitals and nursing note reviewed.   Constitutional:       General: He is not in acute distress.     Appearance: He is well-developed.   HENT:      Head: Normocephalic and atraumatic.   Eyes:      Conjunctiva/sclera: Conjunctivae normal.   Cardiovascular:      Rate and Rhythm: Normal rate and regular rhythm.      Heart sounds: No murmur heard.  Pulmonary:      Effort: Pulmonary effort is normal. No respiratory distress.      Breath sounds: Normal breath sounds.   Abdominal:      Palpations: Abdomen is soft.      Tenderness: There is no abdominal tenderness.   Musculoskeletal:         General: No swelling.      Cervical back: " Neck supple.   Skin:     General: Skin is warm and dry.      Capillary Refill: Capillary refill takes less than 2 seconds.   Neurological:      Mental Status: He is alert.   Psychiatric:         Mood and Affect: Mood normal.          Discussion with Family: Updated  (mother) at bedside.    Discharge instructions/Information to patient and family:   See after visit summary for information provided to patient and family.      Provisions for Follow-Up Care:  See after visit summary for information related to follow-up care and any pertinent home health orders.      Mobility at time of Discharge:   Basic Mobility Inpatient Raw Score: 24  JH-HLM Goal: 8: Walk 250 feet or more  JH-HLM Achieved: 8: Walk 250 feet ot more  HLM Goal achieved. Continue to encourage appropriate mobility.     Disposition:   Home    Planned Readmission: None     Discharge Statement:  I spent 55 minutes discharging the patient. This time was spent on the day of discharge. I had direct contact with the patient on the day of discharge. Greater than 50% of the total time was spent examining patient, answering all patient questions, arranging and discussing plan of care with patient as well as directly providing post-discharge instructions.  Additional time then spent on discharge activities.    Discharge Medications:  See after visit summary for reconciled discharge medications provided to patient and/or family.      **Please Note: This note may have been constructed using a voice recognition system**

## 2024-02-21 NOTE — ASSESSMENT & PLAN NOTE
"POA, left flank pain w/ 2 mm left UVJ calculus causing mild hydroureteronephrosis    Recent Labs     02/17/24  1128 02/18/24  0633 02/19/24  0538   CREATININE 1.23 1.37* 1.14     Urology consulted; POD # s/p stone extraction and left urethral stent placement   Per op note, \"2 mm left distal ureterovesical junction stone fragmented into 1 mm pieces when the basket removed it unable to send specimen 24 cm 6 [place] stent passed\"  Stent removal scheduled as outpatient on 02/21/24  Creatinine stabilized; WBC trending downward  W/ tachycardia, provide IVF bolus & maintenance fluids  Continue Flomax  Ucx w/o growth   Pain and nausea well-controlled  "
"POA, left flank pain w/ 2 mm left UVJ calculus causing mild hydroureteronephrosis    Recent Labs     02/18/24  0633 02/19/24  0538 02/20/24  0457   CREATININE 1.37* 1.14 1.01       Urology consulted; POD #2 s/p stone extraction and left urethral stent placement   Per op note, \"2 mm left distal ureterovesical junction stone fragmented into 1 mm pieces when the basket removed it unable to send specimen 24 cm 6 [place] stent passed\"  Stent removal to be scheduled for next week  Creatinine stabilized  W/ tachycardia, provide IVF bolus & maintenance fluids  Continue Flomax  Ucx w/o growth   Patient with increased left flank pain overnight and this AM. Pyridium added for spasms. PRN pain medications. Continue to monitor.   "
"POA, left flank pain w/ 2 mm left UVJ calculus causing mild hydroureteronephrosis    Recent Labs     02/18/24  0633 02/19/24  0538 02/20/24  0457   CREATININE 1.37* 1.14 1.01       Urology consulted; POD #3 s/p stone extraction and left urethral stent placement   Per op note, \"2 mm left distal ureterovesical junction stone fragmented into 1 mm pieces when the basket removed it unable to send specimen 24 cm 6 [place] stent passed\"  Reports ongoing flank pain but no worse than previous  Has appointment today 2/21/24 in Urology office for stent removal  Creatinine stabilized  Continue Flomax and pyridium  Ucx w/o growth   IV abx discontinued on 2/20/24  Follow up with Urology   "
Continue amlodipine 10 mg daily & lisinopril 10 mg daily  BP acceptable  
Continue amlodipine 10 mg daily and lisinopril 10 mg daily  
Continue amlodipine 10 mg daily and lisinopril 10 mg daily  
Continue fenofibrate  
Follows with hematology and rheumatology  At baseline  
Follows with hematology and rheumatology  At baseline  
Follows with hematology and rheumatology  At baseline    Recent Labs     02/17/24  1128 02/18/24  0632 02/19/24  0538   HGB 17.7* 16.7 15.5      
Follows with hematology and rheumatology  At baseline    Recent Labs     02/18/24  0632 02/19/24  0538 02/20/24  0457   HGB 16.7 15.5 15.8        
Follows with hematology and rheumatology  At baseline    Recent Labs     02/19/24  0538 02/20/24  0457 02/21/24  0457   HGB 15.5 15.8 16.4        
Increase bowel regiment  Serial abdominal exams  
Last BM 3 days ago  Increase bowel regiment  
Last BM 3 days ago  Increase bowel regiment  
POA evidenced by tachycardia and leukocytosis in setting of kidney stone/UTI  CT a/p: 2 mm left UVJ calculus causing mild hydroureteronephrosis. Asymmetric left perinephric edema, presumably reactive.  Lactate negative  Blood cultures pending  Urine culture pending  Continue rocephin  
POA evidenced by tachycardia and leukocytosis in setting of kidney stone/UTI  CT a/p: 2 mm left UVJ calculus causing mild hydroureteronephrosis. Asymmetric left perinephric edema, presumably reactive.  Lactate negative  Blood cultures pending  Urine culture pending  Continue rocephin  
Patient presented to ED with left flank pain on and off past few days, worsening last night.  CT a/p: 2 mm left UVJ calculus causing mild hydroureteronephrosis. Asymmetric left perinephric edema, presumably reactive.  WBC 17.3  Cr appears at baseline  Continue IVF  Start flomax  Nausea and pain medication as needed  Strain all urine  Urology consulted  Npo at midnight  
Patient presented to ED with left flank pain on and off past few days, worsening last night.  CT a/p: 2 mm left UVJ calculus causing mild hydroureteronephrosis. Asymmetric left perinephric edema, presumably reactive.  WBC 17.3  Cr mildly elevated from baseline  Continue IVF  Started on flomax  Nausea and pain medication as needed  Strain all urine  Urology consulted  With persistent pain, planned for cytoscopy ureteroscopy with stent placement today  
Presumed sepsis on admission evidenced by tachycardia & leukocytosis in setting of suspected cystitis  CT abd/pelvis (02/17):   2 mm left UVJ calculus causing mild hydroureteronephrosis. Asymmetric left perinephric edema, presumably reactive  LA 1.9  BCx x 1 set w/ staphylococcus; second set w/o growth x 24 hrs   Ucx w/ < 1000 CFU   WBC improving; continue IVFs   Curbside consult w/ ID; SIRS 2/2 to stone & hydronephrosis without definite infection (no UTI)  Check & trend procal   Continue Rocephin through tomorrow; if stable, d/c ABX  Blood cultures suspected to be contaminant; no need for repeat    Recent Labs     02/17/24  1128 02/18/24  0632 02/19/24  0538   WBC 17.33* 17.06* 13.66*       
Presumed sepsis on admission evidenced by tachycardia & leukocytosis in setting of suspected cystitis  CT abd/pelvis (02/17):   2 mm left UVJ calculus causing mild hydroureteronephrosis. Asymmetric left perinephric edema, presumably reactive  LA 1.9  BCx x 1 set w/ staphylococcus; second set w/o growth x 48 hrs   Ucx w/ < 1000 CFU   WBC improving; continue IVFs   Curbside consult w/ ID; SIRS 2/2 to stone & hydronephrosis without definite infection (no UTI)  Abx were discontinued 2/20/24  Procalcitonin negative  WBC improved today 13K  Blood cultures suspected to be contaminant; no need for repeat    Recent Labs     02/19/24  0538 02/20/24  0457 02/21/24  0457   WBC 13.66* 18.61* 13.05*         
Presumed sepsis on admission evidenced by tachycardia & leukocytosis in setting of suspected cystitis  CT abd/pelvis (02/17):   2 mm left UVJ calculus causing mild hydroureteronephrosis. Asymmetric left perinephric edema, presumably reactive  LA 1.9  BCx x 1 set w/ staphylococcus; second set w/o growth x 48 hrs   Ucx w/ < 1000 CFU   WBC improving; continue IVFs   Curbside consult w/ ID; SIRS 2/2 to stone & hydronephrosis without definite infection (no UTI)  Procalcitonin negative  Rise in WBC 18K, likely reactive in the setting of recent stent  Discontinue ABX today  Blood cultures suspected to be contaminant; no need for repeat    Recent Labs     02/18/24  0632 02/19/24  0538 02/20/24  0457   WBC 17.06* 13.66* 18.61*         
No

## 2024-02-22 ENCOUNTER — TRANSITIONAL CARE MANAGEMENT (OUTPATIENT)
Dept: FAMILY MEDICINE CLINIC | Facility: CLINIC | Age: 44
End: 2024-02-22

## 2024-02-22 LAB — BACTERIA BLD CULT: NORMAL

## 2024-03-01 ENCOUNTER — OFFICE VISIT (OUTPATIENT)
Dept: FAMILY MEDICINE CLINIC | Facility: CLINIC | Age: 44
End: 2024-03-01
Payer: MEDICARE

## 2024-03-01 ENCOUNTER — TELEPHONE (OUTPATIENT)
Age: 44
End: 2024-03-01

## 2024-03-01 ENCOUNTER — APPOINTMENT (OUTPATIENT)
Dept: LAB | Facility: CLINIC | Age: 44
End: 2024-03-01
Payer: MEDICARE

## 2024-03-01 VITALS
HEART RATE: 95 BPM | TEMPERATURE: 99 F | DIASTOLIC BLOOD PRESSURE: 84 MMHG | SYSTOLIC BLOOD PRESSURE: 138 MMHG | WEIGHT: 237 LBS | RESPIRATION RATE: 18 BRPM | HEIGHT: 68 IN | BODY MASS INDEX: 35.92 KG/M2

## 2024-03-01 DIAGNOSIS — E87.6 HYPOKALEMIA: ICD-10-CM

## 2024-03-01 DIAGNOSIS — N20.1 CALCULUS OF URETER: Primary | ICD-10-CM

## 2024-03-01 DIAGNOSIS — A41.9 SEPSIS, DUE TO UNSPECIFIED ORGANISM, UNSPECIFIED WHETHER ACUTE ORGAN DYSFUNCTION PRESENT (HCC): ICD-10-CM

## 2024-03-01 DIAGNOSIS — D72.829 LEUKOCYTOSIS, UNSPECIFIED TYPE: ICD-10-CM

## 2024-03-01 DIAGNOSIS — R39.9 UTI SYMPTOMS: ICD-10-CM

## 2024-03-01 DIAGNOSIS — E66.01 OBESITY, MORBID (HCC): ICD-10-CM

## 2024-03-01 DIAGNOSIS — D45 POLYCYTHEMIA VERA (HCC): ICD-10-CM

## 2024-03-01 LAB
ALBUMIN SERPL BCP-MCNC: 4.4 G/DL (ref 3.5–5)
ALP SERPL-CCNC: 114 U/L (ref 34–104)
ALT SERPL W P-5'-P-CCNC: 49 U/L (ref 7–52)
ANION GAP SERPL CALCULATED.3IONS-SCNC: 14 MMOL/L
AST SERPL W P-5'-P-CCNC: 32 U/L (ref 13–39)
BASOPHILS # BLD MANUAL: 0 THOUSAND/UL (ref 0–0.1)
BASOPHILS NFR MAR MANUAL: 0 % (ref 0–1)
BILIRUB SERPL-MCNC: 0.47 MG/DL (ref 0.2–1)
BUN SERPL-MCNC: 16 MG/DL (ref 5–25)
CALCIUM SERPL-MCNC: 8.6 MG/DL (ref 8.4–10.2)
CHLORIDE SERPL-SCNC: 103 MMOL/L (ref 96–108)
CO2 SERPL-SCNC: 20 MMOL/L (ref 21–32)
CREAT SERPL-MCNC: 0.8 MG/DL (ref 0.6–1.3)
DIFFERENTIAL COMMENT: ABNORMAL
EOSINOPHIL # BLD MANUAL: 0 THOUSAND/UL (ref 0–0.4)
EOSINOPHIL NFR BLD MANUAL: 0 % (ref 0–6)
ERYTHROCYTE [DISTWIDTH] IN BLOOD BY AUTOMATED COUNT: 12.7 % (ref 11.6–15.1)
GFR SERPL CREATININE-BSD FRML MDRD: 108 ML/MIN/1.73SQ M
GLUCOSE SERPL-MCNC: 116 MG/DL (ref 65–140)
HCT VFR BLD AUTO: 48.6 % (ref 36.5–49.3)
HGB BLD-MCNC: 18.2 G/DL (ref 12–17)
LYMPHOCYTES # BLD AUTO: 1.92 THOUSAND/UL (ref 0.6–4.47)
LYMPHOCYTES # BLD AUTO: 25 % (ref 14–44)
MCH RBC QN AUTO: 32.3 PG (ref 26.8–34.3)
MCHC RBC AUTO-ENTMCNC: 37.4 G/DL (ref 31.4–37.4)
MCV RBC AUTO: 86 FL (ref 82–98)
MONOCYTES # BLD AUTO: 0.38 THOUSAND/UL (ref 0–1.22)
MONOCYTES NFR BLD: 5 % (ref 4–12)
MYELOCYTES NFR BLD MANUAL: 2 % (ref 0–1)
NEUTROPHILS # BLD MANUAL: 5.21 THOUSAND/UL (ref 1.85–7.62)
NEUTS BAND NFR BLD MANUAL: 5 % (ref 0–8)
NEUTS SEG NFR BLD AUTO: 63 % (ref 43–75)
PLATELET # BLD AUTO: 310 THOUSANDS/UL (ref 149–390)
PLATELET BLD QL SMEAR: ADEQUATE
PMV BLD AUTO: 10.9 FL (ref 8.9–12.7)
POIKILOCYTOSIS BLD QL SMEAR: PRESENT
POTASSIUM SERPL-SCNC: 4.3 MMOL/L (ref 3.5–5.3)
PROT SERPL-MCNC: 7.5 G/DL (ref 6.4–8.4)
RBC # BLD AUTO: 5.63 MILLION/UL (ref 3.88–5.62)
RBC MORPH BLD: PRESENT
SL AMB  POCT GLUCOSE, UA: NORMAL
SL AMB LEUKOCYTE ESTERASE,UA: NORMAL
SL AMB POCT BILIRUBIN,UA: NORMAL
SL AMB POCT BLOOD,UA: NORMAL
SL AMB POCT CLARITY,UA: CLEAR
SL AMB POCT COLOR,UA: YELLOW
SL AMB POCT KETONES,UA: NORMAL
SL AMB POCT NITRITE,UA: NORMAL
SL AMB POCT PH,UA: 7
SL AMB POCT SPECIFIC GRAVITY,UA: 1.03
SMUDGE CELLS BLD QL SMEAR: PRESENT
SODIUM SERPL-SCNC: 137 MMOL/L (ref 135–147)
WBC # BLD AUTO: 7.66 THOUSAND/UL (ref 4.31–10.16)

## 2024-03-01 PROCEDURE — 80053 COMPREHEN METABOLIC PANEL: CPT

## 2024-03-01 PROCEDURE — 99495 TRANSJ CARE MGMT MOD F2F 14D: CPT | Performed by: NURSE PRACTITIONER

## 2024-03-01 PROCEDURE — 85007 BL SMEAR W/DIFF WBC COUNT: CPT

## 2024-03-01 PROCEDURE — 85027 COMPLETE CBC AUTOMATED: CPT

## 2024-03-01 PROCEDURE — 81000 URINALYSIS NONAUTO W/SCOPE: CPT | Performed by: NURSE PRACTITIONER

## 2024-03-01 PROCEDURE — 36415 COLL VENOUS BLD VENIPUNCTURE: CPT

## 2024-03-01 NOTE — PROGRESS NOTES
Assessment & Plan   1. Calculus of ureter  Managed by urology. S/P stent placement and subsequent removal.     2. Sepsis, due to unspecified organism, unspecified whether acute organ dysfunction present (HCC)  Resolved. Finished course of abx. Will repeat labs. Monitor.   - CBC and differential; Future  - Comprehensive metabolic panel; Future    3. Obesity, morbid (HCC)  Weight loss is recommended to improve overall health.   Dietary changes- limit carbohydrates, decrease overall caloric intake, reduce portion sizes, healthier snack choices, limit saturated fats, increase intake of fruits and vegetables, limit junk food.   exercise to 3-5 times per week. Consider adding strength exercises to exercise routine.     4. Polycythemia vera (HCC)  Stable. Will check labs.   - CBC and differential; Future    5. UTI symptoms  Urine dip in office negative. Follow up with urology as scheduled.   - POCT urine dip non-auto scope    6. Leukocytosis, unspecified type  - CBC and differential; Future    7. Hypokalemia  - Comprehensive metabolic panel; Future    Patient was counseled regarding instructions for management which included: impression/diagnosis, risk/benefits of treatment options, importance of compliance with treatment, risk factor reduction, and prognosis.   I have reviewed the instructions with the patient answering all questions and patient verbalized understanding.       Subjective     Transitional Care Management Review:   Jitendra Silva is a 43 y.o. male here for TCM follow up.     During the TCM phone call patient stated:  TCM Call       Date and time call was made  2/22/2024  5:25 PM    Hospital care reviewed  Discussed with Inpatient Physician; Records reviewed    Patient was hospitialized at  [facility]    Date of Admission  02/17/24    Date of discharge  02/21/24    Diagnosis  Calculus of Uterovesical Junction    Disposition  Home    Were the patients medications reviewed and updated  Yes    Current  "Symptoms  Back pain - left side    Back pain, left side, severity  Severe    Back pain, left side, onset  Ongoing          TCM Call       Post hospital issues  None    Should patient be enrolled in anticoag monitoring?  No    Scheduled for follow up?  No    Did you obtain your prescribed medications  Yes    Do you need help managing your prescriptions or medications  No    Is transportation to your appointment needed  No    I have advised the patient to call PCP with any new or worsening symptoms  Mica MontesDileep MOE    Living Arrangements  Alone    Support System  Family; Friends    The type of support provided  Emotional    Do you have social support  Yes, as much as I need    Are you recieving any outpatient services  No    Are you recieving home care services  No    Are you using any community resources  No    Current waiver services  No    Have you fallen in the last 12 months  No    Interperter language line needed  No    Counseling  Patient; Family    Counseling topics  Diagnostic results; Prognosis          Here for TCM. Accompanied by mother.   Admitted to [facility] for flank pain. Diagnosed with stone and hydronephrosis and sepsis. Treated with IV abx. Followed by urology. Had cysto, stone extraction and stent placement. Subsequently stent removed by Dr Pelayo after discharge.   Remains on flomax and pyridium  Follow up with urology, Dr. Pelayo. Appt scheduled 3/4. States \"want to continue antibiotics. I think I still have an infection\". Finished course of abx/levaquin.   No fever. No dysuria or urinary frequency. Still with intermittent back pain.       Medication Refill  Pertinent negatives include no abdominal pain, fever or rash.     Review of Systems   Constitutional:  Negative for activity change, appetite change and fever.   Gastrointestinal:  Negative for abdominal pain and diarrhea.   Genitourinary:  Positive for flank pain. Negative for dysuria, frequency and urgency.   Musculoskeletal:  Positive " "for back pain.   Skin:  Negative for rash.   Allergic/Immunologic: Negative for immunocompromised state.   Psychiatric/Behavioral:  The patient is nervous/anxious.        Objective   Hospital records reviewed  SLW, Admit 2/17, disch 2/21  Reason for Admission: Calculus of ureterovesical junction     Hospital Course:   Jitendra Silva is a 43 y.o. male patient who originally presented to the hospital on 2/17/2024 due to left flank pain and back pain on and off over the last few days.  Patient underwent CT abdomen pelvis which showed 2 mm left UVJ calculus causing mild hydroureteronephrosis.  Patient was consulted by urology and had cystoscopy with stone extraction and stent placement on 2/18/2024.  Patient also met sepsis criteria on admission.  Urine cultures came back negative for infection.  1 set of blood cultures positive for staph and the other negative x 72 hours.  Curb sided ID, positive blood culture likely a component of skin contaminant.  Patient's procalcitonin negative and white blood cell count downtrending.  He remains afebrile.  He received course of IV ceftriaxone, which was discontinued on 2/20/2024.  Patient reports ongoing left flank pain after stent placement, continue Flomax and Pyridium.  Patient eager for discharge today as he has an appointment with urologist office this afternoon for stent removal.  Patient to follow-up with Dr. Pelayo, the urologist after discharge.  /84   Pulse 95   Temp 99 °F (37.2 °C) (Tympanic)   Resp 18   Ht 5' 8\" (1.727 m)   Wt 108 kg (237 lb)   BMI 36.04 kg/m²      Physical Exam  Vitals reviewed.   Constitutional:       General: He is not in acute distress.     Appearance: He is not ill-appearing.   Cardiovascular:      Rate and Rhythm: Normal rate.   Pulmonary:      Effort: Pulmonary effort is normal.   Abdominal:      General: There is no distension.      Palpations: Abdomen is soft.      Tenderness: There is no abdominal tenderness. There is no right " CVA tenderness or left CVA tenderness.   Skin:     General: Skin is warm and dry.      Coloration: Skin is not jaundiced or pale.   Neurological:      General: No focal deficit present.      Mental Status: He is alert and oriented to person, place, and time.      Cranial Nerves: No cranial nerve deficit.      Sensory: No sensory deficit.   Psychiatric:         Behavior: Behavior normal.         Thought Content: Thought content normal.     Poct ua: neg nitrites, neg leuk, neg blood    Recent Results (from the past 672 hour(s))   CBC and differential    Collection Time: 02/17/24 11:28 AM   Result Value Ref Range    WBC 17.33 (H) 4.31 - 10.16 Thousand/uL    RBC 5.92 (H) 3.88 - 5.62 Million/uL    Hemoglobin 17.7 (H) 12.0 - 17.0 g/dL    Hematocrit 51.2 (H) 36.5 - 49.3 %    MCV 87 82 - 98 fL    MCH 29.9 26.8 - 34.3 pg    MCHC 34.6 31.4 - 37.4 g/dL    RDW 11.9 11.6 - 15.1 %    MPV 9.8 8.9 - 12.7 fL    Platelets 217 149 - 390 Thousands/uL    nRBC 0 /100 WBCs    Neutrophils Relative 85 (H) 43 - 75 %    Immat GRANS % 1 0 - 2 %    Lymphocytes Relative 8 (L) 14 - 44 %    Monocytes Relative 6 4 - 12 %    Eosinophils Relative 0 0 - 6 %    Basophils Relative 0 0 - 1 %    Neutrophils Absolute 14.61 (H) 1.85 - 7.62 Thousands/µL    Immature Grans Absolute 0.15 0.00 - 0.20 Thousand/uL    Lymphocytes Absolute 1.44 0.60 - 4.47 Thousands/µL    Monocytes Absolute 1.07 0.17 - 1.22 Thousand/µL    Eosinophils Absolute 0.01 0.00 - 0.61 Thousand/µL    Basophils Absolute 0.05 0.00 - 0.10 Thousands/µL   Comprehensive metabolic panel    Collection Time: 02/17/24 11:28 AM   Result Value Ref Range    Sodium 136 135 - 147 mmol/L    Potassium 4.4 3.5 - 5.3 mmol/L    Chloride 102 96 - 108 mmol/L    CO2 23 21 - 32 mmol/L    ANION GAP 11 mmol/L    BUN 15 5 - 25 mg/dL    Creatinine 1.23 0.60 - 1.30 mg/dL    Glucose 114 65 - 140 mg/dL    Calcium 8.9 8.4 - 10.2 mg/dL    AST 42 (H) 13 - 39 U/L    ALT 92 (H) 7 - 52 U/L    Alkaline Phosphatase 121 (H) 34 -  104 U/L    Total Protein 7.7 6.4 - 8.4 g/dL    Albumin 4.5 3.5 - 5.0 g/dL    Total Bilirubin 0.70 0.20 - 1.00 mg/dL    eGFR 70 ml/min/1.73sq m   Magnesium    Collection Time: 02/17/24 11:28 AM   Result Value Ref Range    Magnesium 1.8 (L) 1.9 - 2.7 mg/dL   Lipase    Collection Time: 02/17/24 11:28 AM   Result Value Ref Range    Lipase 8 (L) 11 - 82 u/L   UA w Reflex to Microscopic w Reflex to Culture    Collection Time: 02/17/24 11:33 AM    Specimen: Urine, Clean Catch   Result Value Ref Range    Color, UA Yellow     Clarity, UA Clear     Specific Gravity, UA 1.015 1.000 - 1.030    pH, UA 7.0 5.0, 5.5, 6.0, 6.5, 7.0, 7.5, 8.0, 8.5, 9.0    Leukocytes, UA Negative Negative    Nitrite, UA Negative Negative    Protein, UA Trace (A) Negative mg/dl    Glucose, UA Negative Negative mg/dl    Ketones, UA Negative Negative mg/dl    Urobilinogen, UA 0.2 0.2, 1.0 E.U./dl E.U./dl    Bilirubin, UA Negative Negative    Occult Blood, UA Moderate (A) Negative   Urine Microscopic    Collection Time: 02/17/24 11:33 AM   Result Value Ref Range    RBC, UA 2-4 None Seen, 0-1, 1-2, 2-4, 0-5 /hpf    WBC, UA 0-1 None Seen, 0-1, 1-2, 0-5, 2-4 /hpf    Epithelial Cells None Seen None Seen, Occasional /hpf    Bacteria, UA Occasional None Seen, Occasional /hpf   Urine culture    Collection Time: 02/17/24 11:33 AM    Specimen: Urine, Clean Catch   Result Value Ref Range    Urine Culture No Growth <1000 cfu/mL    Lactic acid, plasma (w/reflex if result > 2.0)    Collection Time: 02/17/24  3:01 PM   Result Value Ref Range    LACTIC ACID 1.9 0.5 - 2.0 mmol/L   Blood culture #1    Collection Time: 02/17/24  3:12 PM    Specimen: Arm, Left; Blood   Result Value Ref Range    Blood Culture No Growth After 5 Days.    Blood culture #2    Collection Time: 02/17/24  3:12 PM    Specimen: Hand, Left; Blood   Result Value Ref Range    Blood Culture Pseudomonas oryzihabitans (A)     Blood Culture Staphylococcus capitis (A)     Gram Stain Result Gram negative  rods (A)        Susceptibility    Staphylococcus capitis - LEVON     ZID Performed Yes      Pseudomonas oryzihabitans - LEVON     ZID Performed Yes       Aztreonam ($$$)  <=4 Susceptible ug/ml     Cefepime ($) <=2.00 Susceptible ug/ml     Ceftazidime ($$) <=1 Susceptible ug/ml     Ceftriaxone ($$) <=1.00 Susceptible ug/ml     Gentamicin ($$) <=2 Susceptible ug/ml     Piperacillin + Tazobactam ($$$) <=8 Susceptible ug/ml     Tobramycin ($) <=2 Susceptible ug/ml     Trimethoprim + Sulfamethoxazole ($$$) <=0.5/9.5 Susceptible ug/ml   Blood Culture Identification Panel    Collection Time: 02/17/24  3:12 PM    Specimen: Hand, Left; Blood   Result Value Ref Range    Staphylococcus Detected (A) Not Detected   FLU/RSV/COVID - if FLU/RSV clinically relevant    Collection Time: 02/17/24  3:52 PM    Specimen: Nose; Nares   Result Value Ref Range    SARS-CoV-2 Negative Negative    INFLUENZA A PCR Negative Negative    INFLUENZA B PCR Negative Negative    RSV PCR Negative Negative   CBC (With Platelets)    Collection Time: 02/18/24  6:32 AM   Result Value Ref Range    WBC 17.06 (H) 4.31 - 10.16 Thousand/uL    RBC 5.53 3.88 - 5.62 Million/uL    Hemoglobin 16.7 12.0 - 17.0 g/dL    Hematocrit 48.2 36.5 - 49.3 %    MCV 87 82 - 98 fL    MCH 30.2 26.8 - 34.3 pg    MCHC 34.6 31.4 - 37.4 g/dL    RDW 12.6 11.6 - 15.1 %    Platelets 189 149 - 390 Thousands/uL    MPV 9.9 8.9 - 12.7 fL   Basic metabolic panel    Collection Time: 02/18/24  6:33 AM   Result Value Ref Range    Sodium 134 (L) 135 - 147 mmol/L    Potassium 4.0 3.5 - 5.3 mmol/L    Chloride 100 96 - 108 mmol/L    CO2 26 21 - 32 mmol/L    ANION GAP 8 mmol/L    BUN 16 5 - 25 mg/dL    Creatinine 1.37 (H) 0.60 - 1.30 mg/dL    Glucose 121 65 - 140 mg/dL    Glucose, Fasting 121 (H) 65 - 99 mg/dL    Calcium 8.2 (L) 8.4 - 10.2 mg/dL    eGFR 62 ml/min/1.73sq m   Comprehensive metabolic panel    Collection Time: 02/19/24  5:38 AM   Result Value Ref Range    Sodium 135 135 - 147 mmol/L     Potassium 4.1 3.5 - 5.3 mmol/L    Chloride 101 96 - 108 mmol/L    CO2 25 21 - 32 mmol/L    ANION GAP 9 mmol/L    BUN 17 5 - 25 mg/dL    Creatinine 1.14 0.60 - 1.30 mg/dL    Glucose 204 (H) 65 - 140 mg/dL    Calcium 8.4 8.4 - 10.2 mg/dL    AST 23 13 - 39 U/L    ALT 51 7 - 52 U/L    Alkaline Phosphatase 89 34 - 104 U/L    Total Protein 7.0 6.4 - 8.4 g/dL    Albumin 4.1 3.5 - 5.0 g/dL    Total Bilirubin 0.69 0.20 - 1.00 mg/dL    eGFR 77 ml/min/1.73sq m   CBC    Collection Time: 02/19/24  5:38 AM   Result Value Ref Range    WBC 13.66 (H) 4.31 - 10.16 Thousand/uL    RBC 5.26 3.88 - 5.62 Million/uL    Hemoglobin 15.5 12.0 - 17.0 g/dL    Hematocrit 47.1 36.5 - 49.3 %    MCV 90 82 - 98 fL    MCH 29.5 26.8 - 34.3 pg    MCHC 32.9 31.4 - 37.4 g/dL    RDW 12.5 11.6 - 15.1 %    Platelets 203 149 - 390 Thousands/uL    MPV 10.0 8.9 - 12.7 fL   Procalcitonin    Collection Time: 02/19/24  1:27 PM   Result Value Ref Range    Procalcitonin 0.06 <=0.25 ng/ml   Procalcitonin    Collection Time: 02/20/24  4:57 AM   Result Value Ref Range    Procalcitonin 0.05 <=0.25 ng/ml   CBC    Collection Time: 02/20/24  4:57 AM   Result Value Ref Range    WBC 18.61 (H) 4.31 - 10.16 Thousand/uL    RBC 5.36 3.88 - 5.62 Million/uL    Hemoglobin 15.8 12.0 - 17.0 g/dL    Hematocrit 49.0 36.5 - 49.3 %    MCV 91 82 - 98 fL    MCH 29.5 26.8 - 34.3 pg    MCHC 32.2 31.4 - 37.4 g/dL    RDW 13.0 11.6 - 15.1 %    Platelets 236 149 - 390 Thousands/uL    MPV 10.1 8.9 - 12.7 fL   Basic metabolic panel    Collection Time: 02/20/24  4:57 AM   Result Value Ref Range    Sodium 140 135 - 147 mmol/L    Potassium 4.3 3.5 - 5.3 mmol/L    Chloride 105 96 - 108 mmol/L    CO2 29 21 - 32 mmol/L    ANION GAP 6 mmol/L    BUN 15 5 - 25 mg/dL    Creatinine 1.01 0.60 - 1.30 mg/dL    Glucose 100 65 - 140 mg/dL    Calcium 8.6 8.4 - 10.2 mg/dL    eGFR 90 ml/min/1.73sq m   Fingerstick Glucose (POCT)    Collection Time: 02/20/24  1:05 PM   Result Value Ref Range    POC Glucose 142 (H) 65  - 140 mg/dl   ECG 12 lead    Collection Time: 02/20/24  1:18 PM   Result Value Ref Range    Ventricular Rate 108 BPM    Atrial Rate 108 BPM    KS Interval 148 ms    QRSD Interval 84 ms    QT Interval 350 ms    QTC Interval 469 ms    P Axis 48 degrees    QRS Axis 28 degrees    T Wave Axis 0 degrees   CBC    Collection Time: 02/21/24  4:57 AM   Result Value Ref Range    WBC 13.05 (H) 4.31 - 10.16 Thousand/uL    RBC 5.53 3.88 - 5.62 Million/uL    Hemoglobin 16.4 12.0 - 17.0 g/dL    Hematocrit 48.9 36.5 - 49.3 %    MCV 88 82 - 98 fL    MCH 29.7 26.8 - 34.3 pg    MCHC 33.5 31.4 - 37.4 g/dL    RDW 12.7 11.6 - 15.1 %    Platelets 262 149 - 390 Thousands/uL    MPV 9.7 8.9 - 12.7 fL   Basic metabolic panel    Collection Time: 02/21/24  4:57 AM   Result Value Ref Range    Sodium 138 135 - 147 mmol/L    Potassium 3.4 (L) 3.5 - 5.3 mmol/L    Chloride 101 96 - 108 mmol/L    CO2 29 21 - 32 mmol/L    ANION GAP 8 mmol/L    BUN 15 5 - 25 mg/dL    Creatinine 0.97 0.60 - 1.30 mg/dL    Glucose 92 65 - 140 mg/dL    Calcium 8.5 8.4 - 10.2 mg/dL    eGFR 94 ml/min/1.73sq m   POCT urine dip non-auto scope    Collection Time: 03/01/24  8:11 AM   Result Value Ref Range     COLOR,UA yellow     CLARITY,UA clear     SPECIFIC GRAVITY,UA 1.030      PH,UA 7     LEUKOCYTE ESTERASE,UA neg     NITRITE,UA neg     GLUCOSE, UA norm     KETONES,UA neg     BILIRUBIN,UA norm     BLOOD,UA neg      2/17/2024 CT abd/pelvis   IMPRESSION:   2 mm left UVJ calculus causing mild hydroureteronephrosis. Asymmetric left perinephric edema, presumably reactive.   Fatty liver.   Borderline prostatomegaly.    2/18/2024 pyelogram  IMPRESSION:   Fluoroscopic guidance provided for left retrograde pyelogram and ureteral stent placement    Reviewed lab/diagnostic results with pt including both normal and abnormal findings.   In depth counseling and instructions given. All questions answered during visit.     Medications have been reviewed by provider in current  encounter    NETTA Felix

## 2024-03-01 NOTE — TELEPHONE ENCOUNTER
Patients mother calling and stated that patient was seen this morning and forgot to mentioned that Jitendra has a cough.  She wants to know if Lauryn thinks medication should be sent in for him.  Please call patients mother Rica

## 2024-03-01 NOTE — TELEPHONE ENCOUNTER
I do not think prescription cough medication is necessary.   His lungs were completely clear when I examined him.   If he has a cough, he can take otc cough medication if needed.   Please call to advise. TY

## 2024-03-05 ENCOUNTER — TELEPHONE (OUTPATIENT)
Dept: FAMILY MEDICINE CLINIC | Facility: CLINIC | Age: 44
End: 2024-03-05

## 2024-03-05 NOTE — TELEPHONE ENCOUNTER
Spoke with patient's mother.  She is on vacation in [place] but was waiting to hear back from Lauryn with lab results?  She also was inquiring if you think he should be prescribed more antibiotics.  Just said to give her a call to let her know.  He is here and can pick them up if necessary.

## 2024-03-05 NOTE — TELEPHONE ENCOUNTER
----- Message from NETTA Felix sent at 3/5/2024  7:55 AM EST -----  Regarding: appt  Pt is scheduled for routine fu on Friday 3/8.   Appt can be canceled. He was just seen in office and he was supposed to cancel this appt.

## 2024-03-05 NOTE — TELEPHONE ENCOUNTER
Please call mother regarding above message.   Labs okay. Nothing significant. Needs to follow up with hematology/Dr. Rojas for routine follow up.   Will review in depth at follow up appt in April.   There is absolutely no indication for antibiotics as previously advised.

## 2024-04-03 ENCOUNTER — OFFICE VISIT (OUTPATIENT)
Dept: CARDIOLOGY CLINIC | Facility: CLINIC | Age: 44
End: 2024-04-03
Payer: MEDICARE

## 2024-04-03 VITALS
HEART RATE: 100 BPM | DIASTOLIC BLOOD PRESSURE: 90 MMHG | WEIGHT: 246 LBS | OXYGEN SATURATION: 99 % | SYSTOLIC BLOOD PRESSURE: 166 MMHG | HEIGHT: 68 IN | BODY MASS INDEX: 37.28 KG/M2

## 2024-04-03 DIAGNOSIS — E78.5 DYSLIPIDEMIA: Primary | ICD-10-CM

## 2024-04-03 DIAGNOSIS — I10 PRIMARY HYPERTENSION: ICD-10-CM

## 2024-04-03 PROCEDURE — 99214 OFFICE O/P EST MOD 30 MIN: CPT | Performed by: INTERNAL MEDICINE

## 2024-04-03 RX ORDER — LISINOPRIL 20 MG/1
20 TABLET ORAL DAILY
Start: 2024-04-03

## 2024-04-03 NOTE — PROGRESS NOTES
Cardiology   Jay Barakat DO, [facility]  Paul Soto MD, [facility]  Jorge Luis Colin MD, [facility]  Gillian Martin MD, [facility]  -------------------------------------------------------------------  [facility]  [address], [address], [address]  [place], [place], [address]  [phone]    Cardiology Follow Up  Jitendra Silva  1980 [id number]          Assessment/Plan:    1. Dyslipidemia    2. Primary hypertension        - Patient with large weight gain due to diet change - he will work on changes.  -He does have a family history of CAD as his brother had bypass at the age of 42  - Will increase lisinopril to 20 mg daily along with amlodipine.  - Lipid panel ordered.   - Discussed risk factor reduction including refraining from smoking, eating a diet high in fruits and vegetables, maintaining a healthy weight, limiting screen time along with controlling BP and cholesterol.  Encouraged to exercise 150 minutes a week at a moderate level such as a fast walk or 75 minutes of high intensity.        Interval History:     Jitendra Silva is 43 y.o. male here for followup of hypertension and dyslipidemia.  Since his last visit, he has gained 35 pounds.  Has changed his diet and has not been moving much because of kidney stones and infection.  He denies any chest pain or shortness of breath.  BP elevated.  He has been using lisinopril and amlodipine.    He has a history of hypertension and dyslipidemia.  He stopped taking his medications after losing a large amount of weight.  Previously, he was using fenofibrate and rosuvastatin 10 mg daily.    Prior to his weight loss, blood work showed a total cholesterol of 238, triglycerides of 267 and LDL of 155.  He had blood work done on September 7 which showed total cholesterol 163, triglycerides 174 and LDL of 100.  Recent blood work did not include lipid panel.         The following portions of the patient's history were reviewed and  "updated as appropriate: allergies, current medications, past family history, past medical history, past social history, past surgical history, and problem list.       Current Outpatient Medications:     amLODIPine (NORVASC) 10 mg tablet, Take 1 tablet by mouth daily, Disp: , Rfl:     ergocalciferol (VITAMIN D2) 50,000 units, take 1 capsule by mouth every week with food, Disp: , Rfl:     fenofibrate (TRICOR) 145 mg tablet, Take 145 mg by mouth daily, Disp: , Rfl:     lisinopril (ZESTRIL) 10 mg tablet, Take 1 tablet by mouth daily, Disp: , Rfl:     tamsulosin (FLOMAX) 0.4 mg, Take 1 capsule (0.4 mg total) by mouth daily with dinner, Disp: 30 capsule, Rfl: 0        Review of Systems:  Review of Systems   Constitutional:  Positive for fatigue.   Respiratory:  Negative for shortness of breath.    Cardiovascular:  Negative for chest pain, palpitations and leg swelling.   Musculoskeletal:  Positive for arthralgias and myalgias.   All other systems reviewed and are negative.        Physical Exam:  Vitals:  Vitals:    04/03/24 0817   BP: 166/90   BP Location: Left arm   Patient Position: Sitting   Cuff Size: Large   Pulse: 100   SpO2: 99%   Weight: 112 kg (246 lb)   Height: 5' 8\" (1.727 m)     Physical Exam   Constitutional: He appears healthy. No distress.   Eyes: Pupils are equal, round, and reactive to light. Conjunctivae are normal.   Neck: No JVD present.   Cardiovascular: Normal rate, regular rhythm and normal heart sounds. Exam reveals no gallop and no friction rub.   No murmur heard.  Pulmonary/Chest: Effort normal and breath sounds normal. He has no wheezes. He has no rales.   Musculoskeletal:         General: No tenderness, deformity or edema.      Cervical back: Normal range of motion and neck supple.   Neurological: He is alert and oriented to person, place, and time.   Skin: Skin is warm and dry.         This note was completed in part utilizing [employer] Direct Software.  Grammatical errors, random word " insertions, spelling mistakes, and incomplete sentences can be an occasional consequence of this system secondary to software limitations, ambient noise, and hardware issues.  If you have any questions or concerns about the content, text, or information contained within the body of this dictation, please contact the provider for clarification.

## 2024-04-04 ENCOUNTER — HOSPITAL ENCOUNTER (OUTPATIENT)
Dept: RADIOLOGY | Facility: HOSPITAL | Age: 44
Discharge: HOME | End: 2024-04-04
Attending: SPECIALIST
Payer: MEDICARE

## 2024-04-04 DIAGNOSIS — N20.0 RENAL CALCULUS: ICD-10-CM

## 2024-04-04 PROCEDURE — 76775 US EXAM ABDO BACK WALL LIM: CPT

## 2024-04-10 ENCOUNTER — APPOINTMENT (OUTPATIENT)
Dept: LAB | Facility: CLINIC | Age: 44
End: 2024-04-10
Payer: MEDICARE

## 2024-04-10 DIAGNOSIS — N20.0 CALCULUS OF KIDNEY: ICD-10-CM

## 2024-04-10 LAB
PERIOD: 24 HOURS
SPECIMEN VOL UR: 1500 ML
URATE 24H UR-MCNC: 691.5 MG/24 HRS (ref 250–800)

## 2024-04-10 PROCEDURE — 84560 ASSAY OF URINE/URIC ACID: CPT

## 2024-04-10 PROCEDURE — 82507 ASSAY OF CITRATE: CPT

## 2024-04-10 PROCEDURE — 83945 ASSAY OF OXALATE: CPT

## 2024-04-15 LAB
CITRATE 24H UR-MCNC: 481 MG/L
CITRATE 24H UR-MRATE: 722 MG/24 HR (ref 320–1240)
OXALATE 24H UR-MRATE: 35 MG/24 HR (ref 7–44)
OXALATE UR-MCNC: 23 MG/L

## 2024-06-19 ENCOUNTER — TELEPHONE (OUTPATIENT)
Dept: FAMILY MEDICINE CLINIC | Facility: CLINIC | Age: 44
End: 2024-06-19

## 2024-06-19 NOTE — TELEPHONE ENCOUNTER
Spoke to pts mother, he doesn't wish to come in for his awv at this time. Will come in when he is sick NFA

## 2024-08-09 ENCOUNTER — APPOINTMENT (OUTPATIENT)
Dept: LAB | Facility: CLINIC | Age: 44
End: 2024-08-09
Payer: MEDICARE

## 2024-08-09 DIAGNOSIS — E78.5 HYPERLIPIDEMIA, UNSPECIFIED: ICD-10-CM

## 2024-08-09 LAB
CHOLEST SERPL-MCNC: 184 MG/DL
HDLC SERPL-MCNC: 26 MG/DL
LDLC SERPL CALC-MCNC: 91 MG/DL (ref 0–100)
NONHDLC SERPL-MCNC: 158 MG/DL
TRIGL SERPL-MCNC: 334 MG/DL

## 2024-08-09 PROCEDURE — 80061 LIPID PANEL: CPT

## 2024-08-09 PROCEDURE — 36415 COLL VENOUS BLD VENIPUNCTURE: CPT

## 2024-08-14 ENCOUNTER — OFFICE VISIT (OUTPATIENT)
Dept: CARDIOLOGY CLINIC | Facility: CLINIC | Age: 44
End: 2024-08-14
Payer: MEDICARE

## 2024-08-14 VITALS
HEART RATE: 79 BPM | BODY MASS INDEX: 36.98 KG/M2 | OXYGEN SATURATION: 97 % | HEIGHT: 68 IN | DIASTOLIC BLOOD PRESSURE: 110 MMHG | WEIGHT: 244 LBS | SYSTOLIC BLOOD PRESSURE: 142 MMHG

## 2024-08-14 DIAGNOSIS — E66.01 OBESITY, MORBID (HCC): ICD-10-CM

## 2024-08-14 DIAGNOSIS — E78.5 DYSLIPIDEMIA: ICD-10-CM

## 2024-08-14 DIAGNOSIS — I10 PRIMARY HYPERTENSION: Primary | ICD-10-CM

## 2024-08-14 PROCEDURE — 93000 ELECTROCARDIOGRAM COMPLETE: CPT | Performed by: INTERNAL MEDICINE

## 2024-08-14 PROCEDURE — 99214 OFFICE O/P EST MOD 30 MIN: CPT | Performed by: INTERNAL MEDICINE

## 2024-08-14 RX ORDER — LISINOPRIL 20 MG/1
20 TABLET ORAL DAILY
Qty: 90 TABLET | Refills: 3 | Status: SHIPPED | OUTPATIENT
Start: 2024-08-14

## 2024-08-14 RX ORDER — AMLODIPINE BESYLATE 10 MG/1
10 TABLET ORAL DAILY
Qty: 90 TABLET | Refills: 3 | Status: SHIPPED | OUTPATIENT
Start: 2024-08-14

## 2024-08-14 RX ORDER — FENOFIBRATE 145 MG/1
145 TABLET, COATED ORAL DAILY
Qty: 90 TABLET | Refills: 3 | Status: SHIPPED | OUTPATIENT
Start: 2024-08-14

## 2024-08-14 NOTE — PROGRESS NOTES
Cardiology   Jay Barakat DO, [facility]  Paul Soto MD, [facility]  Jorge Luis Colin MD, [facility]  Gillian Martin MD, [facility]  -------------------------------------------------------------------  [facility]  [address], [address], [address]  [place], [place], [address]  [phone]    Cardiology Follow Up  Jitendra Silva  1980 [id number]          Assessment/Plan:    1. Primary hypertension    2. Dyslipidemia        - Discussed risk factor reduction including refraining from smoking, eating a diet high in fruits and vegetables, maintaining a healthy weight, limiting screen time along with controlling BP and cholesterol.  Encouraged to exercise 150 minutes a week at a moderate level such as a fast walk or 75 minutes of high intensity.    -He does have a family history of CAD as his brother had bypass at the age of 42  - Continue lisinopril 20 mg daily along with amlodipine.  - Lipid panel reviewed.         Interval History:     Jitendra Silva is 43 y.o. male here for followup of hypertension and dyslipidemia.  Since his last visit, he denies any chest pain or shortness of breath.  BP elevated today.    Weight has been unchanged.     He has a history of hypertension and dyslipidemia.   Previously, he was using fenofibrate and rosuvastatin 10 mg daily.    Prior to his weight loss, blood work showed a total cholesterol of 238, triglycerides of 267 and LDL of 155.  He had blood work done on September 7 which showed total cholesterol 163, triglycerides 174 and LDL of 100.  Recent blood work did not include lipid panel.         The following portions of the patient's history were reviewed and updated as appropriate: allergies, current medications, past family history, past medical history, past social history, past surgical history, and problem list.       Current Outpatient Medications:     amLODIPine (NORVASC) 10 mg tablet, Take 1 tablet (10 mg total) by mouth daily,  "Disp: 90 tablet, Rfl: 3    ergocalciferol (VITAMIN D2) 50,000 units, take 1 capsule by mouth every week with food, Disp: , Rfl:     fenofibrate (TRICOR) 145 mg tablet, Take 1 tablet (145 mg total) by mouth daily, Disp: 90 tablet, Rfl: 3    lisinopril (ZESTRIL) 20 mg tablet, Take 1 tablet (20 mg total) by mouth daily, Disp: 90 tablet, Rfl: 3    tamsulosin (FLOMAX) 0.4 mg, Take 1 capsule (0.4 mg total) by mouth daily with dinner, Disp: 30 capsule, Rfl: 0        Review of Systems:  Review of Systems   Constitutional:  Positive for fatigue.   Respiratory:  Negative for shortness of breath.    Cardiovascular:  Negative for chest pain, palpitations and leg swelling.   Musculoskeletal:  Positive for arthralgias and neck pain.   Neurological:  Positive for headaches.   All other systems reviewed and are negative.        Physical Exam:  Vitals:  Vitals:    08/14/24 0806   BP: (!) 142/110   BP Location: Right arm   Patient Position: Sitting   Cuff Size: Large   Pulse: 79   SpO2: 97%   Weight: 111 kg (244 lb)   Height: 5' 8\" (1.727 m)     Physical Exam   Constitutional: He appears healthy. No distress.   Eyes: Pupils are equal, round, and reactive to light. Conjunctivae are normal.   Neck: No JVD present.   Cardiovascular: Normal rate, regular rhythm and normal heart sounds. Exam reveals no gallop and no friction rub.   No murmur heard.  Pulmonary/Chest: Effort normal and breath sounds normal. He has no wheezes. He has no rales.   Musculoskeletal:         General: No tenderness, deformity or edema.      Cervical back: Normal range of motion and neck supple.   Neurological: He is alert and oriented to person, place, and time.   Skin: Skin is warm and dry.         This note was completed in part utilizing M-Modal Fluency Direct Software.  Grammatical errors, random word insertions, spelling mistakes, and incomplete sentences can be an occasional consequence of this system secondary to software limitations, ambient noise, and " hardware issues.  If you have any questions or concerns about the content, text, or information contained within the body of this dictation, please contact the provider for clarification.

## 2024-09-03 ENCOUNTER — HOSPITAL ENCOUNTER (OUTPATIENT)
Dept: RADIOLOGY | Facility: HOSPITAL | Age: 44
Discharge: HOME | End: 2024-09-03
Attending: OTOLARYNGOLOGY
Payer: MEDICARE

## 2024-09-03 DIAGNOSIS — G89.29 CHRONIC THROAT PAIN: ICD-10-CM

## 2024-09-03 DIAGNOSIS — R13.10 DYSPHAGIA, UNSPECIFIED TYPE: ICD-10-CM

## 2024-09-03 DIAGNOSIS — R07.0 CHRONIC THROAT PAIN: ICD-10-CM

## 2024-09-03 PROCEDURE — 74220 X-RAY XM ESOPHAGUS 1CNTRST: CPT

## 2024-09-05 ENCOUNTER — TELEPHONE (OUTPATIENT)
Age: 44
End: 2024-09-05

## 2024-09-05 NOTE — TELEPHONE ENCOUNTER
Patient's mother called back to speak with Dr. Brown before he leaves for the day. Spoke to Liseth and she will speak with Dr. Brown prior to him leaving and give her a call back in regards to the message she left earlier

## 2024-09-05 NOTE — TELEPHONE ENCOUNTER
Patients mother calling regarding testing that they would like completed to look further into what is causing pain in patient.  Patient feels that he has an infection.  Patient would like blood test for infection coming from throat.  Patient would also like CT of nasal bones to the clavicle with contrast. Mother would like call back when ordered.

## 2024-09-06 ENCOUNTER — TELEPHONE (OUTPATIENT)
Age: 44
End: 2024-09-06

## 2024-09-06 NOTE — TELEPHONE ENCOUNTER
No I would not order a random blood test.   I would need to see pt and evaluate to order the appropriate labs.

## 2024-09-06 NOTE — TELEPHONE ENCOUNTER
"Pt mother called asking if PCP can order \"a blood test\" because her pt thinks he has an infection in his head. Stated he's currently seeing ENT for a \"something that he has in his throat\" and reports last time this happened between his nose throat and head he has septic and given antibiotics and he feels the same way. Rica continued to ask if pcp can place an order for blood test can be placed prior to pt coming in for an appt. Rica was notified that further questions and situation needs to further assessed in order for pcp to order a blood test for said symptoms. (Information given was vauge; tried connecting with clinical at the moment and was unable) please advise.  "

## 2024-09-09 ENCOUNTER — APPOINTMENT (OUTPATIENT)
Dept: LAB | Facility: CLINIC | Age: 44
End: 2024-09-09
Payer: MEDICARE

## 2024-09-09 ENCOUNTER — OFFICE VISIT (OUTPATIENT)
Dept: FAMILY MEDICINE CLINIC | Facility: CLINIC | Age: 44
End: 2024-09-09
Payer: MEDICARE

## 2024-09-09 VITALS
BODY MASS INDEX: 36.37 KG/M2 | HEART RATE: 100 BPM | RESPIRATION RATE: 18 BRPM | OXYGEN SATURATION: 97 % | SYSTOLIC BLOOD PRESSURE: 138 MMHG | DIASTOLIC BLOOD PRESSURE: 80 MMHG | WEIGHT: 239.2 LBS | TEMPERATURE: 97.8 F

## 2024-09-09 DIAGNOSIS — H91.92 DECREASED HEARING, LEFT: ICD-10-CM

## 2024-09-09 DIAGNOSIS — K12.0 APHTHAE, ORAL: ICD-10-CM

## 2024-09-09 DIAGNOSIS — H92.02 LEFT EAR PAIN: Primary | ICD-10-CM

## 2024-09-09 DIAGNOSIS — D72.829 LEUKOCYTOSIS, UNSPECIFIED TYPE: ICD-10-CM

## 2024-09-09 DIAGNOSIS — Z00.00 MEDICARE ANNUAL WELLNESS VISIT, SUBSEQUENT: ICD-10-CM

## 2024-09-09 DIAGNOSIS — R07.0 THROAT PAIN: ICD-10-CM

## 2024-09-09 DIAGNOSIS — R51.9 FREQUENT HEADACHES: ICD-10-CM

## 2024-09-09 DIAGNOSIS — H92.02 LEFT EAR PAIN: ICD-10-CM

## 2024-09-09 PROCEDURE — G0439 PPPS, SUBSEQ VISIT: HCPCS | Performed by: NURSE PRACTITIONER

## 2024-09-09 PROCEDURE — 81001 URINALYSIS AUTO W/SCOPE: CPT

## 2024-09-09 PROCEDURE — 99214 OFFICE O/P EST MOD 30 MIN: CPT | Performed by: NURSE PRACTITIONER

## 2024-09-09 NOTE — PATIENT INSTRUCTIONS
Medicare Preventive Visit Patient Instructions  Thank you for completing your Welcome to Medicare Visit or Medicare Annual Wellness Visit today. Your next wellness visit will be due in one year (9/10/2025).  The screening/preventive services that you may require over the next 5-10 years are detailed below. Some tests may not apply to you based off risk factors and/or age. Screening tests ordered at today's visit but not completed yet may show as past due. Also, please note that scanned in results may not display below.  Preventive Screenings:  Service Recommendations Previous Testing/Comments   Colorectal Cancer Screening  Colonoscopy    Fecal Occult Blood Test (FOBT)/Fecal Immunochemical Test (FIT)  Fecal DNA/Cologuard Test  Flexible Sigmoidoscopy Age: 45-75 years old   Colonoscopy: every 10 years (May be performed more frequently if at higher risk)  OR  FOBT/FIT: every 1 year  OR  Cologuard: every 3 years  OR  Sigmoidoscopy: every 5 years  Screening may be recommended earlier than age 45 if at higher risk for colorectal cancer. Also, an individualized decision between you and your healthcare provider will decide whether screening between the ages of 76-85 would be appropriate. Colonoscopy: Not on file  FOBT/FIT: Not on file  Cologuard: Not on file  Sigmoidoscopy: Not on file          Prostate Cancer Screening Individualized decision between patient and health care provider in men between ages of 55-69   Medicare will cover every 12 months beginning on the day after your 50th birthday PSA: No results in last 5 years     Screening Not Indicated     Hepatitis C Screening Once for adults born between 1945 and 1965  More frequently in patients at high risk for Hepatitis C Hep C Antibody: 10/16/2023    Screening Current   Diabetes Screening 1-2 times per year if you're at risk for diabetes or have pre-diabetes Fasting glucose: 121 mg/dL (2/18/2024)  A1C: 5.5 % (4/21/2023)  Screening Current   Cholesterol Screening Once  every 5 years if you don't have a lipid disorder. May order more often based on risk factors. Lipid panel: 08/09/2024  Screening Not Indicated  History Lipid Disorder      Other Preventive Screenings Covered by Medicare:  Abdominal Aortic Aneurysm (AAA) Screening: covered once if your at risk. You're considered to be at risk if you have a family history of AAA or a male between the age of 65-75 who smoking at least 100 cigarettes in your lifetime.  Lung Cancer Screening: covers low dose CT scan once per year if you meet all of the following conditions: (1) Age 55-77; (2) No signs or symptoms of lung cancer; (3) Current smoker or have quit smoking within the last 15 years; (4) You have a tobacco smoking history of at least 20 pack years (packs per day x number of years you smoked); (5) You get a written order from a healthcare provider.  Glaucoma Screening: covered annually if you're considered high risk: (1) You have diabetes OR (2) Family history of glaucoma OR (3)  aged 50 and older OR (4)  American aged 65 and older  Osteoporosis Screening: covered every 2 years if you meet one of the following conditions: (1) Have a vertebral abnormality; (2) On glucocorticoid therapy for more than 3 months; (3) Have primary hyperparathyroidism; (4) On osteoporosis medications and need to assess response to drug therapy.  HIV Screening: covered annually if you're between the age of 15-65. Also covered annually if you are younger than 15 and older than 65 with risk factors for HIV infection. For pregnant patients, it is covered up to 3 times per pregnancy.    Immunizations:  Immunization Recommendations   Influenza Vaccine Annual influenza vaccination during flu season is recommended for all persons aged >= 6 months who do not have contraindications   Pneumococcal Vaccine   * Pneumococcal conjugate vaccine = PCV13 (Prevnar 13), PCV15 (Vaxneuvance), PCV20 (Prevnar 20)  * Pneumococcal polysaccharide vaccine  = PPSV23 (Pneumovax) Adults 19-63 yo with certain risk factors or if 65+ yo  If never received any pneumonia vaccine: recommend Prevnar 20 (PCV20)  Give PCV20 if previously received 1 dose of PCV13 or PPSV23   Hepatitis B Vaccine 3 dose series if at intermediate or high risk (ex: diabetes, end stage renal disease, liver disease)   Respiratory syncytial virus (RSV) Vaccine - COVERED BY MEDICARE PART D  * RSVPreF3 (Arexvy) CDC recommends that adults 60 years of age and older may receive a single dose of RSV vaccine using shared clinical decision-making (SCDM)   Tetanus (Td) Vaccine - COST NOT COVERED BY MEDICARE PART B Following completion of primary series, a booster dose should be given every 10 years to maintain immunity against tetanus. Td may also be given as tetanus wound prophylaxis.   Tdap Vaccine - COST NOT COVERED BY MEDICARE PART B Recommended at least once for all adults. For pregnant patients, recommended with each pregnancy.   Shingles Vaccine (Shingrix) - COST NOT COVERED BY MEDICARE PART B  2 shot series recommended in those 19 years and older who have or will have weakened immune systems or those 50 years and older     Health Maintenance Due:      Topic Date Due   • HIV Screening  Completed   • Hepatitis C Screening  Completed     Immunizations Due:      Topic Date Due   • Pneumococcal Vaccine: Pediatrics (0 to 5 Years) and At-Risk Patients (6 to 64 Years) (1 of 2 - PCV) Never done   • COVID-19 Vaccine (1 - 2023-24 season) Never done   • Influenza Vaccine (1) 09/01/2024     Advance Directives   What are advance directives?  Advance directives are legal documents that state your wishes and plans for medical care. These plans are made ahead of time in case you lose your ability to make decisions for yourself. Advance directives can apply to any medical decision, such as the treatments you want, and if you want to donate organs.   What are the types of advance directives?  There are many types of advance  directives, and each state has rules about how to use them. You may choose a combination of any of the following:  Living will:  This is a written record of the treatment you want. You can also choose which treatments you do not want, which to limit, and which to stop at a certain time. This includes surgery, medicine, IV fluid, and tube feedings.   Durable power of  for healthcare (DPAHC):  This is a written record that states who you want to make healthcare choices for you when you are unable to make them for yourself. This person, called a proxy, is usually a family member or a friend. You may choose more than 1 proxy.  Do not resuscitate (DNR) order:  A DNR order is used in case your heart stops beating or you stop breathing. It is a request not to have certain forms of treatment, such as CPR. A DNR order may be included in other types of advance directives.  Medical directive:  This covers the care that you want if you are in a coma, near death, or unable to make decisions for yourself. You can list the treatments you want for each condition. Treatment may include pain medicine, surgery, blood transfusions, dialysis, IV or tube feedings, and a ventilator (breathing machine).  Values history:  This document has questions about your views, beliefs, and how you feel and think about life. This information can help others choose the care that you would choose.  Why are advance directives important?  An advance directive helps you control your care. Although spoken wishes may be used, it is better to have your wishes written down. Spoken wishes can be misunderstood, or not followed. Treatments may be given even if you do not want them. An advance directive may make it easier for your family to make difficult choices about your care.   Weight Management   Why it is important to manage your weight:  Being overweight increases your risk of health conditions such as heart disease, high blood pressure, type 2  diabetes, and certain types of cancer. It can also increase your risk for osteoarthritis, sleep apnea, and other respiratory problems. Aim for a slow, steady weight loss. Even a small amount of weight loss can lower your risk of health problems.  How to lose weight safely:  A safe and healthy way to lose weight is to eat fewer calories and get regular exercise. You can lose up about 1 pound a week by decreasing the number of calories you eat by 500 calories each day.   Healthy meal plan for weight management:  A healthy meal plan includes a variety of foods, contains fewer calories, and helps you stay healthy. A healthy meal plan includes the following:  Eat whole-grain foods more often.  A healthy meal plan should contain fiber. Fiber is the part of grains, fruits, and vegetables that is not broken down by your body. Whole-grain foods are healthy and provide extra fiber in your diet. Some examples of whole-grain foods are whole-wheat breads and pastas, oatmeal, brown rice, and bulgur.  Eat a variety of vegetables every day.  Include dark, leafy greens such as spinach, kale, alivia greens, and mustard greens. Eat yellow and orange vegetables such as carrots, sweet potatoes, and winter squash.   Eat a variety of fruits every day.  Choose fresh or canned fruit (canned in its own juice or light syrup) instead of juice. Fruit juice has very little or no fiber.  Eat low-fat dairy foods.  Drink fat-free (skim) milk or 1% milk. Eat fat-free yogurt and low-fat cottage cheese. Try low-fat cheeses such as mozzarella and other reduced-fat cheeses.  Choose meat and other protein foods that are low in fat.  Choose beans or other legumes such as split peas or lentils. Choose fish, skinless poultry (chicken or turkey), or lean cuts of red meat (beef or pork). Before you cook meat or poultry, cut off any visible fat.   Use less fat and oil.  Try baking foods instead of frying them. Add less fat, such as margarine, sour cream,  regular salad dressing and mayonnaise to foods. Eat fewer high-fat foods. Some examples of high-fat foods include french fries, doughnuts, ice cream, and cakes.  Eat fewer sweets.  Limit foods and drinks that are high in sugar. This includes candy, cookies, regular soda, and sweetened drinks.  Exercise:  Exercise at least 30 minutes per day on most days of the week. Some examples of exercise include walking, biking, dancing, and swimming. You can also fit in more physical activity by taking the stairs instead of the elevator or parking farther away from stores. Ask your healthcare provider about the best exercise plan for you.      © Copyright [employer] 2018 Information is for End User's use only and may not be sold, redistributed or otherwise used for commercial purposes. All illustrations and images included in CareNotes® are the copyrighted property of [employer] or [employer]

## 2024-09-09 NOTE — PROGRESS NOTES
Ambulatory Visit  Name: Jitendra Silva      : 1980      MRN: [id number]  Encounter Provider: NETTA Felix  Encounter Date: 2024   Encounter department: [facility]    Assessment & Plan   1. Left ear pain  Being managed by ENT. monitor  - CBC and differential; Future  - Comprehensive metabolic panel; Future  - Blood culture; Future  - C-reactive protein; Future    2. Throat pain  - CBC and differential; Future  - Comprehensive metabolic panel; Future  - Blood culture; Future  - C-reactive protein; Future    3. Frequent headaches  - CBC and differential; Future  - Comprehensive metabolic panel; Future  - Blood culture; Future  - C-reactive protein; Future    4. Leukocytosis, unspecified type   CBC and differential; Future  - Comprehensive metabolic panel; Future  - Blood culture; Future  - C-reactive protein; Future  - UA w Reflex to Microscopic w Reflex to Culture; Future    5. Decreased hearing, left  Being managed by ENT    6. Aphthae, oral  Salt water gargles. Tylenol . Monitor.   - CBC and differential; Future  - C-reactive protein; Future    7. Medicare annual wellness visit, subsequent  Heart healthy, carbohydrate controlled diet- limit red meat, limit saturated fat, moderate salt intake, limit junk food, etc.   Regular exercise  Stress management  Routine labwork and screenings as ordered.       BMI Counseling: Body mass index is 36.37 kg/m². The BMI is above normal. Exercise recommendations include exercising 3-5 times per week.     Depression Screening and Follow-up Plan: Patient was screened for depression during today's encounter. They screened negative with a PHQ-2 score of 0.      Preventive health issues were discussed with patient, and age appropriate screening tests were ordered as noted in patient's After Visit Summary. Personalized health advice and appropriate referrals for health education or preventive services given if needed, as noted in  "patient's After Visit Summary.    History of Present Illness     Here for sick visit  Wants to be \"checked for blood sepsis\". States was septic in February and \"feels same\"  Headaches on and off  Followed by ENT . C/o throat pain, difficulty swallowing, left ear pain, headaches. Recently had esophogram which was normal. To have MRI brain and neck  No fevers. No cough  No urinary symptoms           Patient Care Team:  NETTA Felix as PCP - General (Family Medicine)  Cailin Arora as PCP - PCP-[facility] ([facility])  Cassy Sim MD as PCP - PCP-[facility] ([facility])    Review of Systems   Constitutional:  Positive for fatigue. Negative for fever.   HENT:  Positive for ear pain, hearing loss and sore throat. Negative for trouble swallowing and voice change.    Gastrointestinal:  Negative for diarrhea, nausea and vomiting.   Genitourinary:  Negative for dysuria and frequency.   Musculoskeletal:  Positive for neck pain (left side \"inside\").   Skin:  Negative for rash and wound.   Neurological:  Positive for headaches. Negative for dizziness and light-headedness.   Hematological:  Negative for adenopathy.     Medical History Reviewed by provider this encounter:       Annual Wellness Visit Questionnaire   Jitendra is here for his Subsequent Wellness visit. Last Medicare Wellness visit information reviewed, patient interviewed and updates made to the record today.      Health Risk Assessment:   Patient rates overall health as poor. Patient feels that their physical health rating is slightly worse. Patient is very satisfied with their life. Eyesight was rated as slightly worse. Hearing was rated as slightly worse. Patient feels that their emotional and mental health rating is same. Patients states they are never, rarely angry. Patient states they are sometimes unusually tired/fatigued. Pain experienced in the last 7 days has been some. Patient's pain rating has been 5/10. Patient states that he has experienced " no weight loss or gain in last 6 months.     Depression Screening:   PHQ-2 Score: 0      Fall Risk Screening:   In the past year, patient has experienced: no history of falling in past year      Home Safety:  Patient does not have trouble with stairs inside or outside of their home. Patient has working smoke alarms and has working carbon monoxide detector. Home safety hazards include: none.     Nutrition:   Current diet is Regular.     Medications:   Patient is not currently taking any over-the-counter supplements. Patient is able to manage medications.     Activities of Daily Living (ADLs)/Instrumental Activities of Daily Living (IADLs):   Walk and transfer into and out of bed and chair?: Yes  Dress and groom yourself?: Yes    Bathe or shower yourself?: Yes    Feed yourself? Yes  Do your laundry/housekeeping?: Yes  Manage your money, pay your bills and track your expenses?: Yes  Make your own meals?: Yes    Do your own shopping?: Yes    Previous Hospitalizations:   Any hospitalizations or ED visits within the last 12 months?: Yes    How many hospitalizations have you had in the last year?: 1-2    Advance Care Planning:   Living will: No    Durable POA for healthcare: Yes    Advanced directive: No    ACP document given: Yes      Cognitive Screening:   Provider or family/friend/caregiver concerned regarding cognition?: No    PREVENTIVE SCREENINGS      Cardiovascular Screening:    General: History Lipid Disorder, Risks and Benefits Discussed and Screening Current      Diabetes Screening:     General: Screening Current and Risks and Benefits Discussed      Colorectal Cancer Screening:     General: Screening Not Indicated      Prostate Cancer Screening:    General: Screening Not Indicated      Osteoporosis Screening:    General: Screening Not Indicated      Abdominal Aortic Aneurysm (AAA) Screening:        General: Screening Not Indicated      Lung Cancer Screening:     General: Screening Not Indicated      Hepatitis C  Screening:    General: Screening Current      Preventive Screening Comments: Recommended immunizations reviewed, risks/benefits discussed. Pt verbalized understanding.         Screening, Brief Intervention, and Referral to Treatment (SBIRT)    Screening    Typical number of drinks in a week: 0    Single Item Drug Screening:  How often have you used an illegal drug (including marijuana) or a prescription medication for non-medical reasons in the past year? never    Single Item Drug Screen Score: 0  Interpretation: Negative screen for possible drug use disorder    Brief Intervention  Alcohol & drug use screenings were reviewed. No concerns regarding substance use disorder identified.     Social Determinants of Health     Financial Resource Strain: Low Risk  (4/21/2023)    Overall Financial Resource Strain (CARDIA)     Difficulty of Paying Living Expenses: Not hard at all   Food Insecurity: Patient Declined (2/17/2024)    Hunger Vital Sign     Worried About Running Out of Food in the Last Year: Patient declined     Ran Out of Food in the Last Year: Patient declined   Transportation Needs: No Transportation Needs (2/17/2024)    PRAPARE - Transportation     Lack of Transportation (Medical): No     Lack of Transportation (Non-Medical): No   Housing Stability: Low Risk  (2/17/2024)    Housing Stability Vital Sign     Unable to Pay for Housing in the Last Year: No     Number of Times Moved in the Last Year: 1     Homeless in the Last Year: No   Utilities: Not At Risk (2/17/2024)    [facility] Utilities     Threatened with loss of utilities: No   BMI Counseling: Body mass index is 36.37 kg/m². The BMI is above normal. Nutrition recommendations include reducing portion sizes, decreasing overall calorie intake, moderation in carbohydrate intake, reducing intake of saturated fat and trans fat, and reducing intake of cholesterol. Exercise recommendations include exercising 3-5 times per week.  Depression Screening Follow-up Plan:  Patient's depression screening was negative with a PHQ-2 score of 0. Clinically patient does not have depression. No treatment is required.  Falls Plan of Care: Balance, strength, and gait training instructions were provided.      Objective     /80   Pulse 100   Temp 97.8 °F (36.6 °C)   Resp 18   Wt 109 kg (239 lb 3.2 oz)   SpO2 97%   BMI 36.37 kg/m²     Physical Exam  Vitals reviewed.   Constitutional:       General: He is not in acute distress.     Appearance: He is not ill-appearing.   HENT:      Right Ear: Tympanic membrane normal.      Left Ear: Tympanic membrane normal.      Nose: Nose normal.      Mouth/Throat:      Mouth: Mucous membranes are moist.      Pharynx: Oropharynx is clear.      Comments: Flat ulceration to left posterior pharynx, approx 0.5cm diameter  Cardiovascular:      Rate and Rhythm: Normal rate and regular rhythm.   Pulmonary:      Effort: Pulmonary effort is normal. No respiratory distress.      Breath sounds: Normal breath sounds. No wheezing or rales.   Abdominal:      General: There is no distension.      Tenderness: There is no right CVA tenderness or left CVA tenderness.   Skin:     General: Skin is warm and dry.      Coloration: Skin is not jaundiced or pale.   Neurological:      General: No focal deficit present.      Mental Status: He is alert and oriented to person, place, and time.      Cranial Nerves: No cranial nerve deficit.      Sensory: No sensory deficit.   Psychiatric:         Mood and Affect: Mood normal.         Behavior: Behavior normal.         Thought Content: Thought content normal.         Judgment: Judgment normal.

## 2024-09-10 LAB
BACTERIA UR QL AUTO: NORMAL /HPF
BILIRUB UR QL STRIP: NEGATIVE
CLARITY UR: CLEAR
COLOR UR: ABNORMAL
GLUCOSE UR STRIP-MCNC: NEGATIVE MG/DL
HGB UR QL STRIP.AUTO: NEGATIVE
KETONES UR STRIP-MCNC: NEGATIVE MG/DL
LEUKOCYTE ESTERASE UR QL STRIP: NEGATIVE
NITRITE UR QL STRIP: NEGATIVE
NON-SQ EPI CELLS URNS QL MICRO: NORMAL /HPF
PH UR STRIP.AUTO: 7 [PH]
PROT UR STRIP-MCNC: ABNORMAL MG/DL
RBC #/AREA URNS AUTO: NORMAL /HPF
SP GR UR STRIP.AUTO: 1.02 (ref 1–1.03)
UROBILINOGEN UR STRIP-ACNC: <2 MG/DL
WBC #/AREA URNS AUTO: NORMAL /HPF

## 2024-09-24 ENCOUNTER — APPOINTMENT (OUTPATIENT)
Dept: LAB | Facility: CLINIC | Age: 44
End: 2024-09-24
Payer: MEDICARE

## 2024-09-24 LAB
ALBUMIN SERPL BCG-MCNC: 4.9 G/DL (ref 3.5–5)
ALP SERPL-CCNC: 117 U/L (ref 34–104)
ALT SERPL W P-5'-P-CCNC: 36 U/L (ref 7–52)
ANION GAP SERPL CALCULATED.3IONS-SCNC: 10 MMOL/L (ref 4–13)
AST SERPL W P-5'-P-CCNC: 22 U/L (ref 13–39)
BASOPHILS # BLD AUTO: 0.04 THOUSANDS/ΜL (ref 0–0.1)
BASOPHILS NFR BLD AUTO: 1 % (ref 0–1)
BILIRUB SERPL-MCNC: 0.79 MG/DL (ref 0.2–1)
BUN SERPL-MCNC: 19 MG/DL (ref 5–25)
CALCIUM SERPL-MCNC: 9.3 MG/DL (ref 8.4–10.2)
CHLORIDE SERPL-SCNC: 102 MMOL/L (ref 96–108)
CO2 SERPL-SCNC: 26 MMOL/L (ref 21–32)
CREAT SERPL-MCNC: 0.89 MG/DL (ref 0.6–1.3)
CRP SERPL QL: 6 MG/L
EOSINOPHIL # BLD AUTO: 0.2 THOUSAND/ΜL (ref 0–0.61)
EOSINOPHIL NFR BLD AUTO: 2 % (ref 0–6)
ERYTHROCYTE [DISTWIDTH] IN BLOOD BY AUTOMATED COUNT: 12.8 % (ref 11.6–15.1)
GFR SERPL CREATININE-BSD FRML MDRD: 103 ML/MIN/1.73SQ M
GLUCOSE P FAST SERPL-MCNC: 93 MG/DL (ref 65–99)
HCT VFR BLD AUTO: 50.8 % (ref 36.5–49.3)
HGB BLD-MCNC: 16.8 G/DL (ref 12–17)
IMM GRANULOCYTES # BLD AUTO: 0.05 THOUSAND/UL (ref 0–0.2)
IMM GRANULOCYTES NFR BLD AUTO: 1 % (ref 0–2)
LYMPHOCYTES # BLD AUTO: 2.81 THOUSANDS/ΜL (ref 0.6–4.47)
LYMPHOCYTES NFR BLD AUTO: 34 % (ref 14–44)
MCH RBC QN AUTO: 28.5 PG (ref 26.8–34.3)
MCHC RBC AUTO-ENTMCNC: 33.1 G/DL (ref 31.4–37.4)
MCV RBC AUTO: 86 FL (ref 82–98)
MONOCYTES # BLD AUTO: 0.61 THOUSAND/ΜL (ref 0.17–1.22)
MONOCYTES NFR BLD AUTO: 7 % (ref 4–12)
NEUTROPHILS # BLD AUTO: 4.49 THOUSANDS/ΜL (ref 1.85–7.62)
NEUTS SEG NFR BLD AUTO: 55 % (ref 43–75)
NRBC BLD AUTO-RTO: 0 /100 WBCS
PLATELET # BLD AUTO: 260 THOUSANDS/UL (ref 149–390)
PMV BLD AUTO: 10.5 FL (ref 8.9–12.7)
POTASSIUM SERPL-SCNC: 4.4 MMOL/L (ref 3.5–5.3)
PROT SERPL-MCNC: 8 G/DL (ref 6.4–8.4)
RBC # BLD AUTO: 5.89 MILLION/UL (ref 3.88–5.62)
SODIUM SERPL-SCNC: 138 MMOL/L (ref 135–147)
WBC # BLD AUTO: 8.2 THOUSAND/UL (ref 4.31–10.16)

## 2024-09-24 PROCEDURE — 80053 COMPREHEN METABOLIC PANEL: CPT

## 2024-09-24 PROCEDURE — 86140 C-REACTIVE PROTEIN: CPT

## 2024-09-24 PROCEDURE — 85025 COMPLETE CBC W/AUTO DIFF WBC: CPT

## 2024-09-25 ENCOUNTER — APPOINTMENT (OUTPATIENT)
Dept: LAB | Facility: CLINIC | Age: 44
End: 2024-09-25
Payer: MEDICARE

## 2024-09-25 PROCEDURE — 87040 BLOOD CULTURE FOR BACTERIA: CPT

## 2024-09-27 ENCOUNTER — TELEPHONE (OUTPATIENT)
Dept: FAMILY MEDICINE CLINIC | Facility: CLINIC | Age: 44
End: 2024-09-27

## 2024-09-27 NOTE — TELEPHONE ENCOUNTER
Nothing. It is a vague marker. Just like anyone who might have issues with things like arthritis , can take otc meds like tylenol or motrin.

## 2024-09-27 NOTE — TELEPHONE ENCOUNTER
Called and spoke to pts mother  She is wondering what can/should they do regarding the inflammation in the body

## 2024-09-27 NOTE — TELEPHONE ENCOUNTER
----- Message from NETTA Felix sent at 9/27/2024  7:29 AM EDT -----  Please call pt  Labs reviewed.   CRP is an inflammatory marker and that is slightly elevated, indicating inflammation in the body somewhere, non specific/vague  All other labs are wnl or at baseline- WBC not elevated, no growth in blood culture, negative urine  No signs of infection or sepsis which was what pt was concerned about.

## 2024-09-30 LAB — BACTERIA BLD CULT: NORMAL

## 2024-10-10 ENCOUNTER — HOSPITAL ENCOUNTER (OUTPATIENT)
Dept: RADIOLOGY | Facility: HOSPITAL | Age: 44
Discharge: HOME | End: 2024-10-10
Attending: OTOLARYNGOLOGY
Payer: MEDICARE

## 2024-10-10 DIAGNOSIS — R51.9 CHRONIC NONINTRACTABLE HEADACHE, UNSPECIFIED HEADACHE TYPE: ICD-10-CM

## 2024-10-10 DIAGNOSIS — G89.29 CHRONIC NONINTRACTABLE HEADACHE, UNSPECIFIED HEADACHE TYPE: ICD-10-CM

## 2024-10-10 PROCEDURE — 70543 MRI ORBT/FAC/NCK W/O &W/DYE: CPT

## 2024-10-10 PROCEDURE — 70553 MRI BRAIN STEM W/O & W/DYE: CPT

## 2024-10-10 PROCEDURE — A9585 GADOBUTROL INJECTION: HCPCS | Performed by: OTOLARYNGOLOGY

## 2024-10-10 RX ORDER — GADOBUTROL 604.72 MG/ML
11 INJECTION INTRAVENOUS
Status: COMPLETED | OUTPATIENT
Start: 2024-10-10 | End: 2024-10-10

## 2024-10-10 RX ORDER — GADOBUTROL 604.72 MG/ML
11 INJECTION INTRAVENOUS
Status: DISCONTINUED | OUTPATIENT
Start: 2024-10-10 | End: 2024-10-11 | Stop reason: HOSPADM

## 2024-10-10 RX ADMIN — GADOBUTROL 11 ML: 604.72 INJECTION INTRAVENOUS at 09:19

## 2024-10-17 ENCOUNTER — OFFICE VISIT (OUTPATIENT)
Dept: OTOLARYNGOLOGY | Facility: CLINIC | Age: 44
End: 2024-10-17
Payer: MEDICARE

## 2024-10-17 VITALS
TEMPERATURE: 97.6 F | WEIGHT: 228 LBS | BODY MASS INDEX: 34.56 KG/M2 | HEART RATE: 77 BPM | HEIGHT: 68 IN | OXYGEN SATURATION: 96 %

## 2024-10-17 DIAGNOSIS — R51.9 CHRONIC NONINTRACTABLE HEADACHE, UNSPECIFIED HEADACHE TYPE: ICD-10-CM

## 2024-10-17 DIAGNOSIS — J32.0 CHRONIC LEFT MAXILLARY SINUSITIS: ICD-10-CM

## 2024-10-17 DIAGNOSIS — G89.29 CHRONIC NONINTRACTABLE HEADACHE, UNSPECIFIED HEADACHE TYPE: ICD-10-CM

## 2024-10-17 DIAGNOSIS — G89.29 CHRONIC THROAT PAIN: Primary | ICD-10-CM

## 2024-10-17 DIAGNOSIS — J34.3 NASAL TURBINATE HYPERTROPHY: ICD-10-CM

## 2024-10-17 DIAGNOSIS — R07.0 CHRONIC THROAT PAIN: Primary | ICD-10-CM

## 2024-10-17 PROCEDURE — 99214 OFFICE O/P EST MOD 30 MIN: CPT | Performed by: OTOLARYNGOLOGY

## 2024-10-17 RX ORDER — CEFDINIR 300 MG/1
300 CAPSULE ORAL EVERY 12 HOURS SCHEDULED
Qty: 20 CAPSULE | Refills: 0 | Status: SHIPPED | OUTPATIENT
Start: 2024-10-17 | End: 2024-10-24 | Stop reason: SDUPTHER

## 2024-10-17 NOTE — PROGRESS NOTES
"Assessment/Plan:  Pt continues to have intractable pain.  He does note that when he was treated with abx during his kidney stone hospitalization, his pain started to get better.  He also believes that his pain may be related to his left maxillary sinus cyst, because it is in the area of the epicenter of his pain.  Trial of abx.  Will try cefdinir, as pt was being treated with ceftriaxone last time.  Pt schedule for FESS, turbinoplasty, and septoplasty to remove left maxillary sinus cyst.  I did impress upon the patient and his mother that my hope that this will relieve his pain is low, but he still wants to proceed.      Diagnosis ICD-10-CM Associated Orders   1. Chronic throat pain  R07.0     G89.29       2. Chronic nonintractable headache, unspecified headache type  R51.9     G89.29       3. Chronic left maxillary sinusitis  J32.0 cefdinir (OMNICEF) 300 mg capsule      4. Nasal turbinate hypertrophy  J34.3              Subjective:      Patient ID: Jitendra Silva is a 43 y.o. male.    F/u for left intractable left neck throat pain.  The MRI of the head and neck were normal, except for a cyst on the floor of the left maxillary sinus.  The patient continues to have chronic, intractable, pain in the left back of throat, roof of mouth, radiating to left ear and neck.        The following portions of the patient's history were reviewed and updated as appropriate: allergies, current medications, past family history, past medical history, past social history, past surgical history and problem list.    Review of Systems      Objective:      Pulse 77   Temp 97.6 °F (36.4 °C) (Temporal)   Ht 5' 8\" (1.727 m)   Wt 103 kg (228 lb)   SpO2 96%   BMI 34.67 kg/m²          Physical Exam  Constitutional:       Appearance: He is well-developed.   HENT:      Head: Normocephalic and atraumatic.      Right Ear: Tympanic membrane, ear canal and external ear normal. No drainage. No middle ear effusion.      Left Ear: Tympanic " membrane, ear canal and external ear normal. No drainage.  No middle ear effusion.      Nose: Septal deviation present.      Right Turbinates: Enlarged.      Left Turbinates: Enlarged.      Mouth/Throat:      Pharynx: Uvula midline. No oropharyngeal exudate.      Tonsils: 0 on the right. 0 on the left.   Neck:      Thyroid: No thyroid mass or thyromegaly.      Trachea: Trachea normal. No tracheal deviation.     Cardiovascular:      Heart sounds: Normal heart sounds, S1 normal and S2 normal.   Pulmonary:      Breath sounds: Normal breath sounds and air entry.   Abdominal:      General: Bowel sounds are normal.      Palpations: Abdomen is soft.   Lymphadenopathy:      Cervical: No cervical adenopathy.   Neurological:      Mental Status: He is alert.

## 2024-10-24 ENCOUNTER — TELEPHONE (OUTPATIENT)
Dept: OTOLARYNGOLOGY | Facility: CLINIC | Age: 44
End: 2024-10-24

## 2024-10-24 DIAGNOSIS — J32.0 CHRONIC LEFT MAXILLARY SINUSITIS: ICD-10-CM

## 2024-10-24 RX ORDER — CEFDINIR 300 MG/1
300 CAPSULE ORAL EVERY 12 HOURS SCHEDULED
Qty: 20 CAPSULE | Refills: 0 | Status: CANCELLED | OUTPATIENT
Start: 2024-10-24 | End: 2024-11-03

## 2024-10-24 NOTE — TELEPHONE ENCOUNTER
Patient mother called the RX Refill Line. Message is being forwarded to the office.     Patients mother is requesting a message be sent to the office regarding cefdinir (OMNICEF) 300 mg capsule. She states he is doing well on this medication, with no adverse reactions. Please review and if approved [id number] [place] [facility] [address] [address]  Phone: [phone]  Fax: [phone]    Patient has one tablet remaining.    Please contact patient mother at [phone]

## 2024-11-01 NOTE — TELEPHONE ENCOUNTER
Patient's mother called in to check if Jitendra appointment on 12/19 with Dr. Brown, can be moved up for an 8:30am or later 11:30am, due to a 10am appointment she as on the same day in the area. If possible please give mom a call back

## 2024-12-09 ENCOUNTER — ANESTHESIA EVENT (OUTPATIENT)
Dept: PERIOP | Facility: HOSPITAL | Age: 44
End: 2024-12-09
Payer: MEDICARE

## 2024-12-09 NOTE — PRE-PROCEDURE INSTRUCTIONS
Pre-Surgery Instructions:   Medication Instructions    amLODIPine (NORVASC) 10 mg tablet Take day of surgery.    ergocalciferol (VITAMIN D2) 50,000 units Hold from now till after procedure       fenofibrate (TRICOR) 145 mg tablet Take this medication day of surgery if normally taken in the morning.      lisinopril (ZESTRIL) 20 mg tablet Hold day of surgery.    tamsulosin (FLOMAX) 0.4 mg Take night before surgery    Medication instructions for day surgery reviewed. Please use only a sip of water to take your instructed medications. Avoid all over the counter vitamins, supplements and NSAIDS for one week prior to surgery per anesthesia guidelines. Tylenol is ok to take as needed.     You will receive a call one business day prior to surgery with an arrival time and hospital directions. If your surgery is scheduled on a Monday, the hospital will be calling you on the Friday prior to your surgery. If you have not heard from anyone by 8pm, please call the hospital supervisor through the hospital  at [phone]. ([place] [phone] or [place] [phone]).    Do not eat or drink anything after midnight the night before your surgery, including candy, mints, lifesavers, or chewing gum. Do not drink alcohol 24hrs before your surgery. Try not to smoke at least 24hrs before your surgery.       Follow the pre surgery showering instructions as listed in the “My Surgical Experience Booklet” or otherwise provided by your surgeon's office. Do not use a blade to shave the surgical area 1 week before surgery. It is okay to use a clean electric clippers up to 24 hours before surgery. Do not apply any lotions, creams, including makeup, cologne, deodorant, or perfumes after showering on the day of your surgery. Do not use dry shampoo, hair spray, hair gel, or any type of hair products.     No contact lenses, eye make-up, or artificial eyelashes. Remove nail polish, including gel polish, and any artificial, gel, or acrylic  nails if possible. Remove all jewelry including rings and body piercing jewelry.     Wear causal clothing that is easy to take on and off. Consider your type of surgery.    Keep any valuables, jewelry, piercings at home. Please bring any specially ordered equipment (sling, braces) if indicated.    Arrange for a responsible person to drive you to and from the hospital on the day of your surgery. Please confirm the visitor policy for the day of your procedure when you receive your phone call with an arrival time.     Call the surgeon's office with any new illnesses, exposures, or additional questions prior to surgery.    Please reference your “My Surgical Experience Booklet” for additional information to prepare for your upcoming surgery.

## 2024-12-12 ENCOUNTER — HOSPITAL ENCOUNTER (OUTPATIENT)
Facility: HOSPITAL | Age: 44
Setting detail: OUTPATIENT SURGERY
Discharge: HOME | End: 2024-12-12
Attending: OTOLARYNGOLOGY | Admitting: OTOLARYNGOLOGY
Payer: MEDICARE

## 2024-12-12 ENCOUNTER — ANESTHESIA (OUTPATIENT)
Dept: PERIOP | Facility: HOSPITAL | Age: 44
End: 2024-12-12
Payer: MEDICARE

## 2024-12-12 VITALS
HEART RATE: 84 BPM | HEIGHT: 68 IN | SYSTOLIC BLOOD PRESSURE: 118 MMHG | DIASTOLIC BLOOD PRESSURE: 66 MMHG | WEIGHT: 217.37 LBS | BODY MASS INDEX: 32.94 KG/M2 | TEMPERATURE: 97.1 F | OXYGEN SATURATION: 95 % | RESPIRATION RATE: 20 BRPM

## 2024-12-12 DIAGNOSIS — J34.3 NASAL TURBINATE HYPERTROPHY: ICD-10-CM

## 2024-12-12 DIAGNOSIS — J32.0 CHRONIC LEFT MAXILLARY SINUSITIS: ICD-10-CM

## 2024-12-12 DIAGNOSIS — R51.9 CHRONIC NONINTRACTABLE HEADACHE, UNSPECIFIED HEADACHE TYPE: ICD-10-CM

## 2024-12-12 DIAGNOSIS — G89.29 CHRONIC NONINTRACTABLE HEADACHE, UNSPECIFIED HEADACHE TYPE: ICD-10-CM

## 2024-12-12 PROCEDURE — 88305 TISSUE EXAM BY PATHOLOGIST: CPT | Performed by: STUDENT IN AN ORGANIZED HEALTH CARE EDUCATION/TRAINING PROGRAM

## 2024-12-12 PROCEDURE — 88311 DECALCIFY TISSUE: CPT | Performed by: STUDENT IN AN ORGANIZED HEALTH CARE EDUCATION/TRAINING PROGRAM

## 2024-12-12 PROCEDURE — 99236 HOSP IP/OBS SAME DATE HI 85: CPT | Performed by: OTOLARYNGOLOGY

## 2024-12-12 PROCEDURE — C2625 STENT, NON-COR, TEM W/DEL SY: HCPCS | Performed by: OTOLARYNGOLOGY

## 2024-12-12 DEVICE — PROPEL SINUS IMPLANT
Type: IMPLANTABLE DEVICE | Site: NOSE | Status: FUNCTIONAL
Brand: PROPEL

## 2024-12-12 RX ORDER — MAGNESIUM HYDROXIDE 1200 MG/15ML
LIQUID ORAL AS NEEDED
Status: DISCONTINUED | OUTPATIENT
Start: 2024-12-12 | End: 2024-12-12 | Stop reason: HOSPADM

## 2024-12-12 RX ORDER — OXYCODONE AND ACETAMINOPHEN 5; 325 MG/1; MG/1
1 TABLET ORAL EVERY 4 HOURS PRN
Refills: 0 | Status: DISCONTINUED | OUTPATIENT
Start: 2024-12-12 | End: 2024-12-12 | Stop reason: HOSPADM

## 2024-12-12 RX ORDER — SODIUM CHLORIDE, SODIUM LACTATE, POTASSIUM CHLORIDE, CALCIUM CHLORIDE 600; 310; 30; 20 MG/100ML; MG/100ML; MG/100ML; MG/100ML
125 INJECTION, SOLUTION INTRAVENOUS CONTINUOUS
Status: DISCONTINUED | OUTPATIENT
Start: 2024-12-12 | End: 2024-12-12

## 2024-12-12 RX ORDER — LIDOCAINE HYDROCHLORIDE 10 MG/ML
INJECTION, SOLUTION EPIDURAL; INFILTRATION; INTRACAUDAL; PERINEURAL AS NEEDED
Status: DISCONTINUED | OUTPATIENT
Start: 2024-12-12 | End: 2024-12-12

## 2024-12-12 RX ORDER — FENTANYL CITRATE/PF 50 MCG/ML
50 SYRINGE (ML) INJECTION
Status: DISCONTINUED | OUTPATIENT
Start: 2024-12-12 | End: 2024-12-12 | Stop reason: HOSPADM

## 2024-12-12 RX ORDER — SODIUM CHLORIDE, SODIUM LACTATE, POTASSIUM CHLORIDE, CALCIUM CHLORIDE 600; 310; 30; 20 MG/100ML; MG/100ML; MG/100ML; MG/100ML
100 INJECTION, SOLUTION INTRAVENOUS CONTINUOUS
Status: DISCONTINUED | OUTPATIENT
Start: 2024-12-12 | End: 2024-12-12 | Stop reason: HOSPADM

## 2024-12-12 RX ORDER — ROCURONIUM BROMIDE 10 MG/ML
INJECTION, SOLUTION INTRAVENOUS AS NEEDED
Status: DISCONTINUED | OUTPATIENT
Start: 2024-12-12 | End: 2024-12-12

## 2024-12-12 RX ORDER — DEXAMETHASONE SODIUM PHOSPHATE 10 MG/ML
INJECTION, SOLUTION INTRAMUSCULAR; INTRAVENOUS AS NEEDED
Status: DISCONTINUED | OUTPATIENT
Start: 2024-12-12 | End: 2024-12-12

## 2024-12-12 RX ORDER — HYDROMORPHONE HCL/PF 1 MG/ML
0.5 SYRINGE (ML) INJECTION
Status: DISCONTINUED | OUTPATIENT
Start: 2024-12-12 | End: 2024-12-12 | Stop reason: HOSPADM

## 2024-12-12 RX ORDER — FENTANYL CITRATE 50 UG/ML
INJECTION, SOLUTION INTRAMUSCULAR; INTRAVENOUS AS NEEDED
Status: DISCONTINUED | OUTPATIENT
Start: 2024-12-12 | End: 2024-12-12

## 2024-12-12 RX ORDER — PROPOFOL 10 MG/ML
INJECTION, EMULSION INTRAVENOUS AS NEEDED
Status: DISCONTINUED | OUTPATIENT
Start: 2024-12-12 | End: 2024-12-12

## 2024-12-12 RX ORDER — ONDANSETRON 2 MG/ML
INJECTION INTRAMUSCULAR; INTRAVENOUS AS NEEDED
Status: DISCONTINUED | OUTPATIENT
Start: 2024-12-12 | End: 2024-12-12

## 2024-12-12 RX ORDER — OXYMETAZOLINE HYDROCHLORIDE 0.05 G/100ML
SPRAY NASAL AS NEEDED
Status: DISCONTINUED | OUTPATIENT
Start: 2024-12-12 | End: 2024-12-12 | Stop reason: HOSPADM

## 2024-12-12 RX ORDER — EPHEDRINE SULFATE 50 MG/ML
INJECTION INTRAVENOUS AS NEEDED
Status: DISCONTINUED | OUTPATIENT
Start: 2024-12-12 | End: 2024-12-12

## 2024-12-12 RX ORDER — ONDANSETRON 2 MG/ML
4 INJECTION INTRAMUSCULAR; INTRAVENOUS ONCE AS NEEDED
Status: DISCONTINUED | OUTPATIENT
Start: 2024-12-12 | End: 2024-12-12 | Stop reason: HOSPADM

## 2024-12-12 RX ORDER — MIDAZOLAM HYDROCHLORIDE 2 MG/2ML
INJECTION, SOLUTION INTRAMUSCULAR; INTRAVENOUS AS NEEDED
Status: DISCONTINUED | OUTPATIENT
Start: 2024-12-12 | End: 2024-12-12

## 2024-12-12 RX ADMIN — EPHEDRINE SULFATE 5 MG: 50 INJECTION, SOLUTION INTRAVENOUS at 13:38

## 2024-12-12 RX ADMIN — EPHEDRINE SULFATE 10 MG: 50 INJECTION, SOLUTION INTRAVENOUS at 13:59

## 2024-12-12 RX ADMIN — EPHEDRINE SULFATE 5 MG: 50 INJECTION, SOLUTION INTRAVENOUS at 13:55

## 2024-12-12 RX ADMIN — ROCURONIUM BROMIDE 50 MG: 10 INJECTION, SOLUTION INTRAVENOUS at 13:25

## 2024-12-12 RX ADMIN — OXYCODONE HYDROCHLORIDE AND ACETAMINOPHEN 1 TABLET: 5; 325 TABLET ORAL at 15:23

## 2024-12-12 RX ADMIN — SUGAMMADEX 200 MG: 100 INJECTION, SOLUTION INTRAVENOUS at 14:16

## 2024-12-12 RX ADMIN — PROPOFOL 200 MG: 10 INJECTION, EMULSION INTRAVENOUS at 13:24

## 2024-12-12 RX ADMIN — ONDANSETRON 4 MG: 2 INJECTION INTRAMUSCULAR; INTRAVENOUS at 13:28

## 2024-12-12 RX ADMIN — FENTANYL CITRATE 100 MCG: 50 INJECTION, SOLUTION INTRAMUSCULAR; INTRAVENOUS at 13:22

## 2024-12-12 RX ADMIN — LIDOCAINE HYDROCHLORIDE 50 MG: 10 INJECTION, SOLUTION EPIDURAL; INFILTRATION; INTRACAUDAL; PERINEURAL at 13:23

## 2024-12-12 RX ADMIN — MIDAZOLAM 2 MG: 1 INJECTION INTRAMUSCULAR; INTRAVENOUS at 13:18

## 2024-12-12 RX ADMIN — DEXAMETHASONE SODIUM PHOSPHATE 10 MG: 10 INJECTION, SOLUTION INTRAMUSCULAR; INTRAVENOUS at 13:28

## 2024-12-12 RX ADMIN — SODIUM CHLORIDE, SODIUM LACTATE, POTASSIUM CHLORIDE, AND CALCIUM CHLORIDE: .6; .31; .03; .02 INJECTION, SOLUTION INTRAVENOUS at 13:18

## 2024-12-12 NOTE — ANESTHESIA PREPROCEDURE EVALUATION
Procedure:  FUNCTIONAL ENDOSCOPIC SINUS SURGERY (FESS) (Left: Nose)    Relevant Problems   ANESTHESIA (within normal limits)      CARDIO   (+) Primary hypertension      ENDO (within normal limits)      GI/HEPATIC   (+) Gastroesophageal reflux disease without esophagitis      NEURO/PSYCH   (+) Chronic neck pain      PULMONARY (within normal limits)      Other   (+) Polycythemia vera (HCC)        Physical Exam    Airway    Mallampati score: II  TM Distance: >3 FB  Neck ROM: full     Dental   No notable dental hx     Cardiovascular  Rhythm: regular, Rate: normal    Pulmonary   Breath sounds clear to auscultation    Other Findings        Anesthesia Plan  ASA Score- 2 Emergent    Anesthesia Type- general with ASA Monitors.         Additional Monitors:     Airway Plan: ETT.           Plan Factors-Exercise tolerance (METS): >4 METS.    Chart reviewed. EKG reviewed.  Existing labs reviewed. Patient summary reviewed.    Patient is not a current smoker.              Induction- intravenous.    Postoperative Plan- Plan for postoperative opioid use. Planned trial extubation        Informed Consent- Anesthetic plan and risks discussed with patient and mother.  I personally reviewed this patient with the CRNA. Discussed and agreed on the Anesthesia Plan with the CRNA..

## 2024-12-12 NOTE — H&P
H&P - ENT   Name: Jitendra Silva 44 y.o. male I MRN: [id number]  Unit/Bed#: OR POOL I Date of Admission: 12/12/2024   Date of Service: 12/12/2024 I Hospital Day: 0     Assessment & Plan   This is a 44 y.o. year old male here for left endoscopic sinus surgery, and he is stable and optimized for his procedure.    History of Present Illness    Jitendra Silva is a 44 y.o. year old male who presents for removal of a left maxillary sinus cyst.    REVIEW OF SYSTEMS: Per the HPI, and otherwise unremarkable.    Historical Information   Past Medical History:   Diagnosis Date    Chest pain     Headache(784.0)     High blood pressure     High cholesterol     Hypertension     Increased storage iron     Kidney stone     Migraine      Past Surgical History:   Procedure Laterality Date    COLONOSCOPY      FL RETROGRADE PYELOGRAM  02/18/2024    NV CYSTO/URETERO W/LITHOTRIPSY &INDWELL STENT INSRT Left 02/18/2024    Procedure: CYSTOSCOPY URETEROSCOPY WITH basket stone extraction,  RETROGRADE PYELOGRAM AND INSERTION STENT URETERAL;  Surgeon: Trevon Pelayo MD;  Location: [facility];  Service: Urology    NV TONSILLECTOMY & ADENOIDECTOMY AGE 11/> N/A 09/21/2023    Procedure: TONSILLECTOMY;  Surgeon: Tha Brown MD;  Location: [facility];  Service: ENT    TONSILLECTOMY       Social History     Tobacco Use    Smoking status: Never     Passive exposure: Never    Smokeless tobacco: Never    Tobacco comments:     Occasionally   Vaping Use    Vaping status: Never Used   Substance and Sexual Activity    Alcohol use: Yes     Comment: Occasionally    Drug use: Never    Sexual activity: Not Currently     Birth control/protection: None     E-Cigarette/Vaping    E-Cigarette Use Never User      E-Cigarette/Vaping Substances    Nicotine No     THC No     CBD No     Flavoring No     Other No     Unknown No      Family history non-contributory    Meds/Allergies     Current Facility-Administered Medications:     lactated ringers infusion,  125 mL/hr, Intravenous, Continuous  Allergies   Allergen Reactions    Penicillins Hives     Reaction Date: 11Feb2005;       Shellfish-Derived Products - Food Allergy Itching       Objective :  Temp:  [98.7 °F (37.1 °C)] 98.7 °F (37.1 °C)  HR:  [79] 79  BP: (134)/(75) 134/75  Resp:  [18] 18  SpO2:  [97 %] 97 %  O2 Device: None (Room air)    Physical Exam  Gen: NAD  Head: NCAT  CV: RRR  CHEST: Clear  ABD: soft, NT/ND  EXT: no edema

## 2024-12-12 NOTE — DISCHARGE INSTR - AVS FIRST PAGE
Sleep with head of bed elevated.  Change gauze dressing under nose as needed.  Use nasal saline spray copiously as needed.

## 2024-12-12 NOTE — PROGRESS NOTES
Reviewed discharge instructions with patient and family member. Answered questions appropriately and provided gauze for home use. Patient dressed independently and transported to front entrance via wheelchair. Jihan Galdamez

## 2024-12-12 NOTE — ANESTHESIA POSTPROCEDURE EVALUATION
Post-Op Assessment Note    CV Status:  Stable  Pain Score: 2    Pain management: adequate       Mental Status:  Alert and awake   Hydration Status:  Euvolemic and stable   PONV Controlled:  Controlled   Airway Patency:  Patent     Post Op Vitals Reviewed: Yes    No anethesia notable event occurred.    Staff: Anesthesiologist           Last Filed PACU Vitals:  Vitals Value Taken Time   Temp 97.1 °F (36.2 °C) 12/12/24 1430   Pulse 83 12/12/24 1445   /64 12/12/24 1445   Resp 20 12/12/24 1445   SpO2 95 % 12/12/24 1445       Modified [place]:  Activity: 2 (12/12/2024  3:05 PM)  Respiration: 2 (12/12/2024  3:05 PM)  Circulation: 2 (12/12/2024  3:05 PM)  Consciousness: 2 (12/12/2024  3:05 PM)  Oxygen Saturation: 2 (12/12/2024  3:05 PM)  Modified Belen Score: 10 (12/12/2024  3:05 PM)

## 2024-12-12 NOTE — ANESTHESIA POSTPROCEDURE EVALUATION
Post-Op Assessment Note    CV Status:  Stable  Pain Score: 0    Pain management: adequate       Mental Status:  Alert   Hydration Status:  Stable   PONV Controlled:  Controlled   Airway Patency:  Patent     Post Op Vitals Reviewed: Yes    No anethesia notable event occurred.    Staff: Anesthesiologist, CRNA           Last Filed PACU Vitals:  Vitals Value Taken Time   Temp 97.1 °F (36.2 °C) 12/12/24 1430   Pulse 96 12/12/24 1430   /60 12/12/24 1430   Resp 18 12/12/24 1430   SpO2 97 % 12/12/24 1430       Modified [place]en:  Activity: 2 (12/12/2024  2:30 PM)  Respiration: 2 (12/12/2024  2:30 PM)  Circulation: 2 (12/12/2024  2:30 PM)  Consciousness: 2 (12/12/2024  2:30 PM)  Oxygen Saturation: 2 (12/12/2024  2:30 PM)  Modified Belen Score: 10 (12/12/2024  2:30 PM)

## 2024-12-12 NOTE — OP NOTE
OPERATIVE REPORT  PATIENT NAME: Jitendra Silva    :  1980  MRN: [id number]  Pt Location: [facility] OR ROOM 02    SURGERY DATE: 2024    Surgeons and Role:     * Tha Brown MD - Primary    Preop Diagnosis:  Chronic nonintractable headache, unspecified headache type [R51.9, G89.29]  Chronic left maxillary sinusitis [J32.0]  Nasal turbinate hypertrophy [J34.3]    Post-Op Diagnosis Codes:     * Chronic nonintractable headache, unspecified headache type [R51.9, G89.29]     * Chronic left maxillary sinusitis [J32.0]     * Nasal turbinate hypertrophy [J34.3]    Procedure(s):  Left - LEFT MAXILLARY ENDOSCOPIC ANTROSTOMY WITH REMOVAL OF TISSUE    Specimen(s):  ID Type Source Tests Collected by Time Destination   1 : LEFT MAXILLARY SINUS CONTENTS Tissue Maxillary Sinus TISSUE EXAM Tha Brown MD 2024 [id number]        Estimated Blood Loss:   Minimal    Drains:  * No LDAs found *    Anesthesia Type:   General    Operative Indications:  Chronic nonintractable headache, unspecified headache type [R51.9, G89.29]  Chronic left maxillary sinusitis [J32.0]  Nasal turbinate hypertrophy [J34.3]      Operative Findings:  Mucocele of left maxillary sinus      Complications:   None    Procedure and Technique:  After induction of general endotracheal anesthesia, the patient was draped in the usual sterile fashion.  The nose was packed with pledgets soaked in oxymetazoline.  The left nasal cavity was viewed with the 0-degree sinus endoscope.  The middle turbinate was medialized with a [place] elevator.  The 30-degree endoscope was used to view the maxillary sinus ostium.  A left maxillary antrostomy was made using the backbiting forceps and the through-cut forceps.  There was a cyst in the posteroinferior maxillary sinus.  The cyst was removed with upbiting forceps and giraffe forceps.  The maxillary antrum was examined with the 30-degree and 70-degree sinus endoscopes to ensure that the cyst had been completely  removed.  The sinus was suctioned with a curved suction.  A ProPel 23mm sinus stent was placed in the middle meatus, lateral to the middle turbinate.  The nose and pharynx were suctioned of any remaining blood and secretions.   I was present for the entire procedure.    Patient Disposition:  PACU              SIGNATURE: Tha Brown MD  DATE: December 12, 2024  TIME: 2:18 PM

## 2024-12-17 PROCEDURE — NC001 PR NO CHARGE: Performed by: STUDENT IN AN ORGANIZED HEALTH CARE EDUCATION/TRAINING PROGRAM

## 2024-12-17 PROCEDURE — 88305 TISSUE EXAM BY PATHOLOGIST: CPT | Performed by: STUDENT IN AN ORGANIZED HEALTH CARE EDUCATION/TRAINING PROGRAM

## 2024-12-19 ENCOUNTER — OFFICE VISIT (OUTPATIENT)
Dept: OTOLARYNGOLOGY | Facility: CLINIC | Age: 44
End: 2024-12-19

## 2024-12-19 VITALS — BODY MASS INDEX: 32.89 KG/M2 | HEIGHT: 68 IN | WEIGHT: 217 LBS

## 2024-12-19 DIAGNOSIS — R07.0 CHRONIC THROAT PAIN: ICD-10-CM

## 2024-12-19 DIAGNOSIS — G89.29 CHRONIC THROAT PAIN: ICD-10-CM

## 2024-12-19 DIAGNOSIS — J32.0 CHRONIC LEFT MAXILLARY SINUSITIS: Primary | ICD-10-CM

## 2024-12-19 PROCEDURE — 99024 POSTOP FOLLOW-UP VISIT: CPT | Performed by: OTOLARYNGOLOGY

## 2024-12-19 RX ORDER — PREDNISOLONE ACETATE 10 MG/ML
SUSPENSION/ DROPS OPHTHALMIC
COMMUNITY
Start: 2024-12-09

## 2024-12-19 RX ORDER — DIFLUPREDNATE OPHTHALMIC 0.5 MG/ML
EMULSION OPHTHALMIC
COMMUNITY
Start: 2024-12-16

## 2024-12-19 NOTE — PROGRESS NOTES
"Assessment/Plan:  Begin daily Sinus Rinse.  F/u in 2 wks.      Diagnosis ICD-10-CM Associated Orders   1. Chronic left maxillary sinusitis  J32.0       2. Chronic throat pain  R07.0     G89.29              Subjective:      Patient ID: Jitendra Silva is a 44 y.o. male.    F/u for left maxillary FESS.  No c/o.  Pathology was benign.        The following portions of the patient's history were reviewed and updated as appropriate: allergies, current medications, past family history, past medical history, past social history, past surgical history and problem list.    Review of Systems      Objective:      Ht 5' 8\" (1.727 m)   Wt 98.4 kg (217 lb)   BMI 32.99 kg/m²          Physical Exam  Constitutional:       Appearance: He is well-developed.   HENT:      Head: Normocephalic and atraumatic.      Right Ear: Tympanic membrane, ear canal and external ear normal. No drainage. No middle ear effusion.      Left Ear: Tympanic membrane, ear canal and external ear normal. No drainage.  No middle ear effusion.      Nose: Nose normal.        Mouth/Throat:      Pharynx: Uvula midline. No oropharyngeal exudate.      Tonsils: 0 on the right. 0 on the left.   Neck:      Thyroid: No thyroid mass or thyromegaly.      Trachea: Trachea normal. No tracheal deviation.   Cardiovascular:      Heart sounds: Normal heart sounds, S1 normal and S2 normal.   Pulmonary:      Breath sounds: Normal breath sounds and air entry.   Abdominal:      General: Bowel sounds are normal.      Palpations: Abdomen is soft.   Lymphadenopathy:      Cervical: No cervical adenopathy.   Neurological:      Mental Status: He is alert.         "

## 2025-01-02 ENCOUNTER — OFFICE VISIT (OUTPATIENT)
Dept: OTOLARYNGOLOGY | Facility: CLINIC | Age: 45
End: 2025-01-02
Payer: MEDICARE

## 2025-01-02 VITALS — WEIGHT: 217 LBS | TEMPERATURE: 98.1 F | HEIGHT: 68 IN | BODY MASS INDEX: 32.89 KG/M2

## 2025-01-02 DIAGNOSIS — J34.1 MUCOUS RETENTION CYST OF MAXILLARY SINUS: Primary | ICD-10-CM

## 2025-01-02 DIAGNOSIS — Z98.890 HISTORY OF ENDOSCOPIC SINUS SURGERY: ICD-10-CM

## 2025-01-02 PROCEDURE — 31237 NSL/SINS NDSC SURG BX POLYPC: CPT | Performed by: OTOLARYNGOLOGY

## 2025-01-02 PROCEDURE — 99024 POSTOP FOLLOW-UP VISIT: CPT | Performed by: OTOLARYNGOLOGY

## 2025-01-02 NOTE — PROGRESS NOTES
"Assessment/Plan:  Healing well.  Propel stent removed.  Crusts debrided from left maxillary sinus.  Continue Sinus Rinse.  F/u in 2 wks.      Diagnosis ICD-10-CM Associated Orders   1. Mucous retention cyst of maxillary sinus  J34.1       2. History of endoscopic sinus surgery  Z98.890              Subjective:      Patient ID: Jitendra Silva is a 44 y.o. male.    3 wk f/u for left endoscopic maxillary antrostomy with removal of cyst.  Pt has been using Sinus Rinse.      The following portions of the patient's history were reviewed and updated as appropriate: allergies, current medications, past family history, past medical history, past social history, past surgical history and problem list.    Review of Systems      Objective:      Temp 98.1 °F (36.7 °C) (Temporal)   Ht 5' 8\" (1.727 m)   Wt 98.4 kg (217 lb)   BMI 32.99 kg/m²          Physical Exam  Constitutional:       Appearance: He is well-developed.   HENT:      Head: Normocephalic and atraumatic.      Right Ear: Tympanic membrane, ear canal and external ear normal. No drainage. No middle ear effusion.      Left Ear: Tympanic membrane, ear canal and external ear normal. No drainage.  No middle ear effusion.      Nose: Nose normal.        Mouth/Throat:      Pharynx: Uvula midline. No oropharyngeal exudate.      Tonsils: 0 on the right. 0 on the left.   Neck:      Thyroid: No thyroid mass or thyromegaly.      Trachea: Trachea normal. No tracheal deviation.   Lymphadenopathy:      Cervical: No cervical adenopathy.   Neurological:      Mental Status: He is alert.       SinoNasal Endoscopy with Debridement Procedure Note:  Indication:  S/p FESS  Scope: 30-degree sinus endoscope  Verbal consent obtained.  Surgeon: Tha Brown MD  Anesthesia: 4% lidocaine, oxymetazoline  Rigid Sinus Endoscope passed through nasal cavities bilaterally.  Nasopharynx: normal  Left Nasal Cavity:              Mucosa: healing well              Secretions: clear              Crusts " removed from [facility]  Other findings = ProPel stent removed on left.  Patient tolerated procedure well without complications

## 2025-01-16 ENCOUNTER — OFFICE VISIT (OUTPATIENT)
Dept: OTOLARYNGOLOGY | Facility: CLINIC | Age: 45
End: 2025-01-16
Payer: MEDICARE

## 2025-01-16 VITALS — BODY MASS INDEX: 32.89 KG/M2 | WEIGHT: 217 LBS | HEIGHT: 68 IN | TEMPERATURE: 98.2 F

## 2025-01-16 DIAGNOSIS — J34.1 MUCOUS RETENTION CYST OF MAXILLARY SINUS: Primary | ICD-10-CM

## 2025-01-16 DIAGNOSIS — Z98.890 HISTORY OF ENDOSCOPIC SINUS SURGERY: ICD-10-CM

## 2025-01-16 PROCEDURE — 31237 NSL/SINS NDSC SURG BX POLYPC: CPT | Performed by: OTOLARYNGOLOGY

## 2025-01-16 NOTE — PROGRESS NOTES
"Assessment/Plan:  Healing well.  Minimal crusts debrided from left maxillary sinus.  Continue Sinus Rinse at least another 2-3 wks  F/u in 3 months.      Diagnosis ICD-10-CM Associated Orders   1. Mucous retention cyst of maxillary sinus  J34.1       2. History of endoscopic sinus surgery  Z98.890              Subjective:      Patient ID: Jitendra Silva is a 44 y.o. male.    F/u for left maxillary FESS on 12/12/24.  Pt notes that his chronic pain in the left side of his head is very much improved.      The following portions of the patient's history were reviewed and updated as appropriate: allergies, current medications, past family history, past medical history, past social history, past surgical history and problem list.    Review of Systems      Objective:      Temp 98.2 °F (36.8 °C) (Temporal)   Ht 5' 8\" (1.727 m)   Wt 98.4 kg (217 lb)   BMI 32.99 kg/m²          Physical Exam  Constitutional:       Appearance: He is well-developed.   HENT:      Head: Normocephalic and atraumatic.      Right Ear: Tympanic membrane, ear canal and external ear normal. No drainage. No middle ear effusion.      Left Ear: Tympanic membrane, ear canal and external ear normal. No drainage.  No middle ear effusion.      Nose: Nose normal.      Mouth/Throat:      Pharynx: Uvula midline. No oropharyngeal exudate.      Tonsils: 0 on the right. 0 on the left.   Neck:      Thyroid: No thyroid mass or thyromegaly.      Trachea: Trachea normal. No tracheal deviation.   Lymphadenopathy:      Cervical: No cervical adenopathy.   Neurological:      Mental Status: He is alert.       SinoNasal Endoscopy with Debridement Procedure Note:  Indication:  S/p FESS  Scope: 30-degree sinus endoscope  Verbal consent obtained.  Surgeon: Tha Brown MD  Anesthesia: 4% lidocaine, oxymetazoline  Rigid Sinus Endoscope passed through nasal cavities bilaterally.  Nasopharynx: normal  Left Nasal Cavity:              Mucosa: healing well              Secretions: " clear              Minimal crusts removed from [facility]  Other findings = healed well.  Patient tolerated procedure well without complications

## 2025-03-05 ENCOUNTER — OFFICE VISIT (OUTPATIENT)
Dept: FAMILY MEDICINE CLINIC | Facility: CLINIC | Age: 45
End: 2025-03-05
Payer: MEDICARE

## 2025-03-05 VITALS
DIASTOLIC BLOOD PRESSURE: 80 MMHG | HEIGHT: 68 IN | BODY MASS INDEX: 32.19 KG/M2 | HEART RATE: 118 BPM | WEIGHT: 212.4 LBS | SYSTOLIC BLOOD PRESSURE: 168 MMHG | TEMPERATURE: 98.2 F | RESPIRATION RATE: 97 BRPM

## 2025-03-05 DIAGNOSIS — R10.31 RIGHT INGUINAL PAIN: Primary | ICD-10-CM

## 2025-03-05 DIAGNOSIS — Z12.11 ENCOUNTER FOR COLORECTAL CANCER SCREENING: ICD-10-CM

## 2025-03-05 DIAGNOSIS — K59.09 OTHER CONSTIPATION: ICD-10-CM

## 2025-03-05 DIAGNOSIS — Z12.12 ENCOUNTER FOR COLORECTAL CANCER SCREENING: ICD-10-CM

## 2025-03-05 PROBLEM — D45 POLYCYTHEMIA VERA (HCC): Status: RESOLVED | Noted: 2023-12-22 | Resolved: 2025-03-05

## 2025-03-05 PROBLEM — E66.01 OBESITY, MORBID (HCC): Status: RESOLVED | Noted: 2024-03-01 | Resolved: 2025-03-05

## 2025-03-05 PROCEDURE — 99214 OFFICE O/P EST MOD 30 MIN: CPT | Performed by: FAMILY MEDICINE

## 2025-03-05 PROCEDURE — G2211 COMPLEX E/M VISIT ADD ON: HCPCS | Performed by: FAMILY MEDICINE

## 2025-03-05 NOTE — PATIENT INSTRUCTIONS
1.  Please call ultrasound to get this week [phone]; I will call you with results   2.  Would recommend getting MiraLAX a capful of MiraLAX in water mix it and use it every day till you have a full bowel movement and then stop using it

## 2025-03-05 NOTE — PROGRESS NOTES
Jitendra Silva 1980 male MRN: [id number]    [facility] OFFICE VISIT  [facility]      ASSESSMENT/PLAN  Jitendra Silva is a 44 y.o. male presents to the office for    Diagnoses and all orders for this visit:    Right inguinal pain  -     US inguinal area; Future    Encounter for colorectal cancer screening  -     Ambulatory Referral to Gastroenterology; Future    Other constipation       1.  Please call ultrasound to get this week [phone]; I will call you with results   2.  Would recommend getting MiraLAX a capful 17 g of MiraLAX in water mix it and use it every day till you have a full bowel movement and then stop using it      Reviewed CT scan from April 2023 that demonstrated that the patient did have a hernia         Future Appointments   Date Time Provider Department Center   3/10/2025  1:30 PM [address]   4/3/2025  9:40 AM DO VONNIE Isbell [facility]   4/17/2025  9:00 AM Tha Brown MD [facility]          SUBJECTIVE  CC: Hernia (Patient coughed really hard now right groin area he feels pressure and discomfort , patient was told awhile ago he had a hernia but did not get it looked at . Patient said he has not moved his bowels for a couple days . He went alittle this morning but not his usual bowel movement )      HPI:  Jitendra Silva is a 44 y.o. male who presents for an acute appointment. Had an allergy attack was coughing a lot and then started feeling right groin area pressure and discomfort.  Was told he had a hernia in the past.  It was confirmed on April 2023 CT of abdomen.  Patient states that this has become very uncomfortable therefore he about a hernia belt that has not been helping.  States that he has had not moved his bowels in a couple days as well.  Review of Systems   Constitutional:  Negative for activity change, appetite change, chills, fatigue and fever.   HENT:  Negative  "for congestion.    Respiratory:  Negative for cough, chest tightness and shortness of breath.    Cardiovascular:  Negative for chest pain and leg swelling.   Gastrointestinal:  Positive for abdominal pain. Negative for abdominal distention, constipation, diarrhea, nausea and vomiting.   All other systems reviewed and are negative.      Historical Information   The patient history was reviewed as follows:  Past Medical History:   Diagnosis Date   • Chest pain    • Headache(784.0)    • High blood pressure    • High cholesterol    • Hypertension    • Increased storage iron    • Kidney stone    • Migraine          Medications:     Current Outpatient Medications:   •  amLODIPine (NORVASC) 10 mg tablet, Take 1 tablet (10 mg total) by mouth daily, Disp: 90 tablet, Rfl: 3  •  ergocalciferol (VITAMIN D2) 50,000 units, take 1 capsule by mouth every week with food, Disp: , Rfl:   •  fenofibrate (TRICOR) 145 mg tablet, Take 1 tablet (145 mg total) by mouth daily, Disp: 90 tablet, Rfl: 3  •  lisinopril (ZESTRIL) 20 mg tablet, Take 1 tablet (20 mg total) by mouth daily, Disp: 90 tablet, Rfl: 3  •  prednisoLONE acetate (PRED FORTE) 1 % ophthalmic suspension, , Disp: , Rfl:   •  Difluprednate 0.05 % EMUL, , Disp: , Rfl:   •  tamsulosin (FLOMAX) 0.4 mg, Take 1 capsule (0.4 mg total) by mouth daily with dinner (Patient not taking: Reported on 3/5/2025), Disp: 30 capsule, Rfl: 0    Allergies   Allergen Reactions   • Penicillins Hives     Reaction Date: 11Feb2005;      • Shellfish-Derived Products - Food Allergy Itching       OBJECTIVE  Vitals:   Vitals:    03/05/25 0900   BP: 168/80   BP Location: Left arm   Patient Position: Sitting   Cuff Size: Standard   Pulse: (!) 118   Resp: (!) 97   Temp: 98.2 °F (36.8 °C)   TempSrc: Tympanic   Weight: 96.3 kg (212 lb 6.4 oz)   Height: 5' 8\" (1.727 m)         Physical Exam  Vitals reviewed.   Constitutional:       Appearance: He is well-developed.   HENT:      Head: Normocephalic and atraumatic. "   Eyes:      Conjunctiva/sclera: Conjunctivae normal.      Pupils: Pupils are equal, round, and reactive to light.   Cardiovascular:      Rate and Rhythm: Normal rate and regular rhythm.      Heart sounds: Normal heart sounds, S1 normal and S2 normal. No murmur heard.  Pulmonary:      Effort: Pulmonary effort is normal. No respiratory distress.      Breath sounds: Normal breath sounds. No wheezing.   Abdominal:      Tenderness: There is abdominal tenderness.      Hernia: A hernia (right easily reducible) is present.   Musculoskeletal:         General: Normal range of motion.      Cervical back: Normal range of motion and neck supple.   Skin:     General: Skin is warm.   Neurological:      Mental Status: He is alert and oriented to person, place, and time.   Psychiatric:         Speech: Speech normal.         Behavior: Behavior normal.         Thought Content: Thought content normal.         Judgment: Judgment normal.                    Deirdre Hill MD,   [facility] [place]  3/5/2025

## 2025-03-10 ENCOUNTER — HOSPITAL ENCOUNTER (OUTPATIENT)
Dept: RADIOLOGY | Facility: HOSPITAL | Age: 45
Discharge: HOME | End: 2025-03-10
Attending: FAMILY MEDICINE
Payer: MEDICARE

## 2025-03-10 DIAGNOSIS — R10.31 RIGHT INGUINAL PAIN: ICD-10-CM

## 2025-03-10 PROCEDURE — 76705 ECHO EXAM OF ABDOMEN: CPT

## 2025-03-11 DIAGNOSIS — R10.31 RIGHT INGUINAL PAIN: Primary | ICD-10-CM

## 2025-03-12 ENCOUNTER — RESULTS FOLLOW-UP (OUTPATIENT)
Dept: FAMILY MEDICINE CLINIC | Facility: CLINIC | Age: 45
End: 2025-03-12

## 2025-03-28 ENCOUNTER — CONSULT (OUTPATIENT)
Age: 45
End: 2025-03-28
Payer: MEDICARE

## 2025-03-28 VITALS
HEIGHT: 68 IN | TEMPERATURE: 97.4 F | OXYGEN SATURATION: 97 % | WEIGHT: 217.6 LBS | BODY MASS INDEX: 32.98 KG/M2 | DIASTOLIC BLOOD PRESSURE: 89 MMHG | HEART RATE: 83 BPM | SYSTOLIC BLOOD PRESSURE: 144 MMHG

## 2025-03-28 DIAGNOSIS — R10.31 RIGHT INGUINAL PAIN: Primary | ICD-10-CM

## 2025-03-28 DIAGNOSIS — K40.90 LEFT INGUINAL HERNIA: ICD-10-CM

## 2025-03-28 PROCEDURE — 99204 OFFICE O/P NEW MOD 45 MIN: CPT | Performed by: SPECIALIST

## 2025-03-28 NOTE — PROGRESS NOTES
Name: Jitendra Silva      : 1980      MRN: [id number]  Encounter Provider: Jadiel Segovia MD  Encounter Date: 3/28/2025   Encounter department: [facility]  :  Assessment & Plan  Right inguinal pain  Small fat-containing right inguinal hernia   Discomfort has improved patient attributes it to history of cough secondary to allergic reaction  has an appointment with the urologist for urinary complain  At present patient's symptoms have markedly improved  And patient and patient's mom at bedside wants to wait and watch    Advised follow-up with Dr. Ashley BUENO for possible laparoscopic bilateral inguinal hernia repair  Orders:    Ambulatory Referral to General Surgery    Left inguinal hernia  Small fat-containing left inguinal hernia  Incidental finding on CT scan  Advised laparoscopic bilateral inguinal hernia repair  After urology evaluation for urinary symptoms           History of Present Illness   Jitendra Silva is a 44 y.o. male who presents I had right groin discomfort and I think it was secondary to I was coughing a lot secondary to seasonal allergies now  My groin discomfort has improved  I do not have any pain  But my ultrasound was done for groin and it shows hernia has been referred to see you    Denies any nausea vomiting diarrhea constipation  Denies any history of smoking or chronic cough  Denies excessive weight lifting  Patient has some urinary complaints had kidney stones and prostate symptoms was put on Flomax but over the ER patient has stopped taking Flomax    Patient CT scan and ultrasound was reviewed  Patient has similar finding in the CT scan of abdominal pelvis in     Has an appointment with Dr. Trevon Pelayo MD urologist for follow-up on 2025      Review of Systems   Constitutional: Negative.  Negative for activity change, appetite change, chills, diaphoresis, fatigue and fever.   HENT:  Negative for congestion.    Eyes: Negative.     Respiratory: Negative.  Negative for apnea, cough, chest tightness and shortness of breath.    Cardiovascular:  Negative for chest pain, palpitations and leg swelling.   Gastrointestinal:  Negative for abdominal distention, abdominal pain, constipation, nausea and vomiting.   Genitourinary:  Positive for dysuria, frequency and testicular pain.   Musculoskeletal:  Positive for neck pain.   Skin:  Negative for rash.   Neurological: Negative.    Hematological: Negative.    Psychiatric/Behavioral: Negative.      as per HPI.  Past Medical History   Past Medical History:   Diagnosis Date    Chest pain     Headache(784.0)     High blood pressure     High cholesterol     Hypertension     Increased storage iron     Kidney stone     Migraine      Past Surgical History:   Procedure Laterality Date    COLONOSCOPY      FL RETROGRADE PYELOGRAM  02/18/2024    MA CYSTO/URETERO W/LITHOTRIPSY &INDWELL STENT INSRT Left 02/18/2024    Procedure: CYSTOSCOPY URETEROSCOPY WITH basket stone extraction,  RETROGRADE PYELOGRAM AND INSERTION STENT URETERAL;  Surgeon: Trevon Pelayo MD;  Location: [facility];  Service: Urology    MA NSL/SINUS NDSC MAX ANTROST W/RMVL TISS MAX SINUS Left 12/12/2024    Procedure: LEFT MAXILLARY ENDOSCOPIC ANTROSTOMY WITH REMOVAL OF TISSUE;  Surgeon: Tha Brown MD;  Location: [facility] OR;  Service: ENT    MA TONSILLECTOMY & ADENOIDECTOMY AGE 11/> N/A 09/21/2023    Procedure: TONSILLECTOMY;  Surgeon: Tha Brown MD;  Location: [facility] OR;  Service: ENT    TONSILLECTOMY       Family History   Problem Relation Age of Onset    Cancer Mother         Breast cancer    Arthritis Mother     Cancer Father       reports that he has never smoked. He has never been exposed to tobacco smoke. He has never used smokeless tobacco. He reports current alcohol use. He reports that he does not use drugs.  Current Outpatient Medications   Medication Instructions    amLODIPine (NORVASC) 10 mg, Oral, Daily     "Difluprednate 0.05 % EMUL     ergocalciferol (VITAMIN D2) 50,000 units take 1 capsule by mouth every week with food    fenofibrate (TRICOR) 145 mg, Oral, Daily    lisinopril (ZESTRIL) 20 mg, Oral, Daily    prednisoLONE acetate (PRED FORTE) 1 % ophthalmic suspension     tamsulosin (FLOMAX) 0.4 mg, Oral, Daily with dinner     Allergies   Allergen Reactions    Penicillins Hives     Reaction Date: 11Feb2005;       Shellfish-Derived Products - Food Allergy Itching         Objective   /89 (BP Location: Left arm, Patient Position: Sitting, Cuff Size: Large)   Pulse 83   Temp (!) 97.4 °F (36.3 °C) (Core)   Ht 5' 8\" (1.727 m)   Wt 98.7 kg (217 lb 9.6 oz)   SpO2 97%   BMI 33.09 kg/m²      Physical Exam  Vitals and nursing note reviewed. Chaperone present: Mother at bedside.   Constitutional:       Appearance: Normal appearance. He is obese.      Comments: BMI 33.09   HENT:      Head: Normocephalic and atraumatic.      Mouth/Throat:      Mouth: Mucous membranes are moist.   Eyes:      Extraocular Movements: Extraocular movements intact.      Pupils: Pupils are equal, round, and reactive to light.   Cardiovascular:      Rate and Rhythm: Normal rate and regular rhythm.      Pulses: Normal pulses.      Heart sounds: Normal heart sounds.   Pulmonary:      Effort: Pulmonary effort is normal.      Breath sounds: Normal breath sounds.   Abdominal:      Palpations: Abdomen is soft.      Comments: Minimal cough impulse on both side no inguinal or scrotal swelling mild tenderness both testicles testicles are normal to his age   Musculoskeletal:         General: Normal range of motion.      Cervical back: Normal range of motion and neck supple.   Skin:     General: Skin is warm.   Neurological:      General: No focal deficit present.      Mental Status: He is alert and oriented to person, place, and time.   Psychiatric:         Mood and Affect: Mood normal.         Behavior: Behavior normal.               "

## 2025-03-28 NOTE — ASSESSMENT & PLAN NOTE
Small fat-containing right inguinal hernia   Discomfort has improved patient attributes it to history of cough secondary to allergic reaction  has an appointment with the urologist for urinary complain  At present patient's symptoms have markedly improved  And patient and patient's mom at bedside wants to wait and watch    Advised follow-up with Dr. Ashley BUENO for possible laparoscopic bilateral inguinal hernia repair  Orders:    Ambulatory Referral to General Surgery

## 2025-03-28 NOTE — ASSESSMENT & PLAN NOTE
Small fat-containing left inguinal hernia  Incidental finding on CT scan  Advised laparoscopic bilateral inguinal hernia repair  After urology evaluation for urinary symptoms

## 2025-03-31 RX ORDER — BROMFENAC 0.76 MG/ML
SOLUTION/ DROPS OPHTHALMIC
COMMUNITY
Start: 2025-03-27

## 2025-04-03 ENCOUNTER — OFFICE VISIT (OUTPATIENT)
Dept: CARDIOLOGY CLINIC | Facility: CLINIC | Age: 45
End: 2025-04-03
Payer: MEDICARE

## 2025-04-03 VITALS
SYSTOLIC BLOOD PRESSURE: 126 MMHG | WEIGHT: 218 LBS | HEART RATE: 77 BPM | OXYGEN SATURATION: 97 % | HEIGHT: 68 IN | DIASTOLIC BLOOD PRESSURE: 88 MMHG | BODY MASS INDEX: 33.04 KG/M2

## 2025-04-03 DIAGNOSIS — I10 PRIMARY HYPERTENSION: Primary | ICD-10-CM

## 2025-04-03 DIAGNOSIS — Z82.49 FAMILY HISTORY OF EARLY CAD: ICD-10-CM

## 2025-04-03 DIAGNOSIS — E78.5 DYSLIPIDEMIA: ICD-10-CM

## 2025-04-03 PROCEDURE — 93000 ELECTROCARDIOGRAM COMPLETE: CPT | Performed by: INTERNAL MEDICINE

## 2025-04-03 PROCEDURE — 99214 OFFICE O/P EST MOD 30 MIN: CPT | Performed by: INTERNAL MEDICINE

## 2025-04-03 RX ORDER — AMLODIPINE BESYLATE 10 MG/1
10 TABLET ORAL DAILY
Qty: 90 TABLET | Refills: 3 | Status: SHIPPED | OUTPATIENT
Start: 2025-04-03

## 2025-04-03 RX ORDER — LISINOPRIL 20 MG/1
20 TABLET ORAL DAILY
Qty: 90 TABLET | Refills: 3 | Status: SHIPPED | OUTPATIENT
Start: 2025-04-03

## 2025-04-03 NOTE — PROGRESS NOTES
Cardiology   Jay Barakat DO, [facility]  Paul Soto MD, [facility]  Jorge Luis Colin MD, [facility]  Gillian Martin MD, [facility]  -------------------------------------------------------------------  [facility]  [address], [address], [address]  [place], [place], [address]  [phone]    Cardiology Follow Up  Jitendra Silva  1980 [id number]          Assessment/Plan:    1. Primary hypertension    2. Dyslipidemia    3. Family history of early CAD        - Discussed risk factor reduction including refraining from smoking, eating a diet high in fruits and vegetables, maintaining a healthy weight, limiting screen time along with controlling BP and cholesterol.  Encouraged to exercise 150 minutes a week at a moderate level such as a fast walk or 75 minutes of high intensity.    -He does have a family history of CAD as his brother had bypass at the age of 42  - Continue lisinopril 20 mg daily along with amlodipine.  - Lipid panel ordered.       Interval History:     Jitendra Silva is 44 y.o. male here for followup of hypertension and dyslipidemia.  Since his last visit, he has been feeling well.  he denies any palpitations, chest pain, shortness of breath, LE edema, orthopnea or PND.   He has lost 20 pounds since last visit but has gained since 2023.     He has a history of hypertension and dyslipidemia.   Previously, he was using fenofibrate and rosuvastatin 10 mg daily but stopped.     Prior to his weight loss, blood work showed a total cholesterol of 238, triglycerides of 267 and LDL of 155.     He has no recent blood work.       The following portions of the patient's history were reviewed and updated as appropriate: allergies, current medications, past family history, past medical history, past social history, past surgical history, and problem list.       Current Outpatient Medications:     amLODIPine (NORVASC) 10 mg tablet, Take 1 tablet (10 mg total) by mouth daily,  "Disp: 90 tablet, Rfl: 3    Bromfenac Sodium 0.075 % SOLN, , Disp: , Rfl:     ergocalciferol (VITAMIN D2) 50,000 units, take 1 capsule by mouth every week with food, Disp: , Rfl:     fenofibrate (TRICOR) 145 mg tablet, Take 1 tablet (145 mg total) by mouth daily, Disp: 90 tablet, Rfl: 3    lisinopril (ZESTRIL) 20 mg tablet, Take 1 tablet (20 mg total) by mouth daily, Disp: 90 tablet, Rfl: 3        Review of Systems:  Review of Systems   Constitutional:  Positive for fatigue.   Respiratory:  Negative for shortness of breath.    Cardiovascular:  Negative for chest pain, palpitations and leg swelling.   Musculoskeletal:  Positive for arthralgias and neck pain.   Neurological:  Positive for headaches.   All other systems reviewed and are negative.        Physical Exam:  Vitals:  Vitals:    04/03/25 0909   BP: 126/88   BP Location: Left arm   Patient Position: Sitting   Cuff Size: Large   Pulse: 77   SpO2: 97%   Weight: 98.9 kg (218 lb)   Height: 5' 8\" (1.727 m)     Physical Exam   Constitutional: He appears healthy. No distress.   Eyes: Pupils are equal, round, and reactive to light. Conjunctivae are normal.   Neck: No JVD present.   Cardiovascular: Normal rate, regular rhythm and normal heart sounds. Exam reveals no gallop and no friction rub.   No murmur heard.  Pulmonary/Chest: Effort normal and breath sounds normal. He has no wheezes. He has no rales.   Musculoskeletal:         General: No tenderness, deformity or edema.      Cervical back: Normal range of motion and neck supple.   Neurological: He is alert and oriented to person, place, and time.   Skin: Skin is warm and dry.         This note was completed in part utilizing Med Access Direct Software.  Grammatical errors, random word insertions, spelling mistakes, and incomplete sentences can be an occasional consequence of this system secondary to software limitations, ambient noise, and hardware issues.  If you have any questions or concerns about the " content, text, or information contained within the body of this dictation, please contact the provider for clarification.

## 2025-04-05 ENCOUNTER — NURSE TRIAGE (OUTPATIENT)
Dept: OTHER | Facility: OTHER | Age: 45
End: 2025-04-05

## 2025-04-05 NOTE — TELEPHONE ENCOUNTER
"Regarding: antibiotic  ----- Message from Peggy ROOT sent at 4/5/2025 11:18 AM EDT -----  \"The doctor had ordered my soon an antibotic when his head hurt. I wanted to know if it could be ordered again.\"    "

## 2025-04-05 NOTE — TELEPHONE ENCOUNTER
"FOLLOW UP: Patient will need an appointment - requesting ENT appointment     REASON FOR CONVERSATION: Headache    SYMPTOMS: Head pain on the left side     OTHER: Patient does not want an appointment with PCP and wants to see Dr. Brown at ENT office     DISPOSITION: See PCP Within 3 Days    Reason for Disposition   [1] MILD-MODERATE headache AND [2] present > 3 days (72 hours) AND [3] no improvement after using Care Advice    Answer Assessment - Initial Assessment Questions  1. LOCATION: \"Where does it hurt?\"         Head pain on the left side     2. ONSET: \"When did the headache start?\" (e.g., minutes, hours, days)         Pain has been ongoing for several years - worsening over the last couple of days    3. PATTERN: \"Does the pain come and go, or has it been constant since it started?\"        Pain is constant     4. SEVERITY: \"How bad is the pain?\" and \"What does it keep you from doing?\"  (e.g., Scale 1-10; mild, moderate, or severe)        Pain is limiting to patient's daily activity but not different than it has been in the past    5. RECURRENT SYMPTOM: \"Have you ever had headaches before?\" If Yes, ask: \"When was the last time?\" and \"What happened that time?\"         Patient has had this pain in the past and states the doctor prescribed antibiotics that helped     6. CAUSE: \"What do you think is causing the headache?\"        Patient thinks it may be infection     7. MIGRAINE: \"Have you been diagnosed with migraine headaches?\" If Yes, ask: \"Is this headache similar?\"         No    8. HEAD INJURY: \"Has there been any recent injury to the head?\"         No     9. OTHER SYMPTOMS: \"Do you have any other symptoms?\" (e.g., fever, stiff neck, eye pain, sore throat, cold symptoms)        No other symptoms    Protocols used: Headache-Adult-    "

## 2025-04-09 ENCOUNTER — OFFICE VISIT (OUTPATIENT)
Dept: FAMILY MEDICINE CLINIC | Facility: CLINIC | Age: 45
End: 2025-04-09
Payer: MEDICARE

## 2025-04-09 VITALS
SYSTOLIC BLOOD PRESSURE: 134 MMHG | TEMPERATURE: 97.8 F | HEART RATE: 75 BPM | RESPIRATION RATE: 16 BRPM | OXYGEN SATURATION: 97 % | HEIGHT: 68 IN | BODY MASS INDEX: 33.62 KG/M2 | WEIGHT: 221.8 LBS | DIASTOLIC BLOOD PRESSURE: 104 MMHG

## 2025-04-09 DIAGNOSIS — Z12.12 ENCOUNTER FOR COLORECTAL CANCER SCREENING: ICD-10-CM

## 2025-04-09 DIAGNOSIS — Z12.11 ENCOUNTER FOR COLORECTAL CANCER SCREENING: ICD-10-CM

## 2025-04-09 DIAGNOSIS — J02.9 SORE THROAT: ICD-10-CM

## 2025-04-09 PROCEDURE — G2211 COMPLEX E/M VISIT ADD ON: HCPCS | Performed by: FAMILY MEDICINE

## 2025-04-09 PROCEDURE — 99214 OFFICE O/P EST MOD 30 MIN: CPT | Performed by: FAMILY MEDICINE

## 2025-04-09 RX ORDER — CEFDINIR 300 MG/1
300 CAPSULE ORAL EVERY 12 HOURS SCHEDULED
Qty: 20 CAPSULE | Refills: 0 | Status: SHIPPED | OUTPATIENT
Start: 2025-04-09 | End: 2025-04-17 | Stop reason: SDUPTHER

## 2025-04-09 RX ORDER — CEFDINIR 300 MG/1
300 CAPSULE ORAL EVERY 12 HOURS SCHEDULED
COMMUNITY
End: 2025-04-17 | Stop reason: SDUPTHER

## 2025-04-09 NOTE — PROGRESS NOTES
Name: Jitendra Silva      : 1980      MRN: [id number]  Encounter Provider: Deirdre Hill MD  Encounter Date: 2025   Encounter department: [facility]    Assessment & Plan  Sore throat  Given that there is improvement in the past with cefdinir refilled medication for a total of 10 days.  Encouraged to use Allegra with antibiotic  Orders:  •  cefdinir (OMNICEF) 300 mg capsule; Take 1 capsule (300 mg total) by mouth every 12 (twelve) hours for 10 days    Encounter for colorectal cancer screening    Orders:  •  Ambulatory Referral to Gastroenterology; Future         History of Present Illness     Sore Throat   Associated symptoms include ear pain. Pertinent negatives include no abdominal pain, congestion, coughing, diarrhea, shortness of breath or vomiting.     Chief Complaint   Patient presents with   • Sore Throat     Patient said he has a problem around right top gum area , the area is inflamed , then his throat started getting sore the patient said it feels like it is deep in his neck . Patient has had this problem before . He has an ent appointment next month       Patient said he has been having pain on the right top gum area.  Patient also has a sore throat.  Patient states that he has an ENT appointment next month.  States that the ENT gave him an antibiotic last time and felt like it worked.  And would like the same prescription if possible      Review of Systems   Constitutional:  Negative for activity change, appetite change, chills, fatigue and fever.   HENT:  Positive for ear pain and sore throat. Negative for congestion.    Respiratory:  Negative for cough, chest tightness and shortness of breath.    Cardiovascular:  Negative for chest pain and leg swelling.   Gastrointestinal:  Negative for abdominal distention, abdominal pain, constipation, diarrhea, nausea and vomiting.   All other systems reviewed and are negative.    Past Medical History:    Diagnosis Date   • Chest pain    • Headache(784.0)    • High blood pressure    • High cholesterol    • Hypertension    • Increased storage iron    • Kidney stone    • Lyme disease    • Migraine      Past Surgical History:   Procedure Laterality Date   • COLONOSCOPY     • FL RETROGRADE PYELOGRAM  02/18/2024   • MD CYSTO/URETERO W/LITHOTRIPSY &INDWELL STENT INSRT Left 02/18/2024    Procedure: CYSTOSCOPY URETEROSCOPY WITH basket stone extraction,  RETROGRADE PYELOGRAM AND INSERTION STENT URETERAL;  Surgeon: Trevon Pelayo MD;  Location: [facility];  Service: Urology   • MD NSL/SINUS NDSC MAX ANTROST W/RMVL TISS MAX SINUS Left 12/12/2024    Procedure: LEFT MAXILLARY ENDOSCOPIC ANTROSTOMY WITH REMOVAL OF TISSUE;  Surgeon: Tha Brown MD;  Location: [facility];  Service: ENT   • MD TONSILLECTOMY & ADENOIDECTOMY AGE 11/> N/A 09/21/2023    Procedure: TONSILLECTOMY;  Surgeon: Tha Brown MD;  Location: [facility];  Service: ENT   • TONSILLECTOMY       Family History   Problem Relation Age of Onset   • Cancer Mother         Breast cancer   • Arthritis Mother    • Cancer Father    • Early death Father         Lymphoma     Social History     Tobacco Use   • Smoking status: Never     Passive exposure: Never   • Smokeless tobacco: Never   • Tobacco comments:     Occasionally   Vaping Use   • Vaping status: Never Used   Substance and Sexual Activity   • Alcohol use: Yes     Comment: Occasionally   • Drug use: Never   • Sexual activity: Not Currently     Birth control/protection: None     Current Outpatient Medications on File Prior to Visit   Medication Sig   • amLODIPine (NORVASC) 10 mg tablet Take 1 tablet (10 mg total) by mouth daily   • Bromfenac Sodium 0.075 % SOLN    • cefdinir (OMNICEF) 300 mg capsule Take 300 mg by mouth every 12 (twelve) hours   • ergocalciferol (VITAMIN D2) 50,000 units take 1 capsule by mouth every week with food   • lisinopril (ZESTRIL) 20 mg tablet Take 1 tablet (20 mg total) by  "mouth daily     Allergies   Allergen Reactions   • Penicillins Hives     Reaction Date: 11Feb2005;      • Shellfish-Derived Products - Food Allergy Itching     Immunization History   Administered Date(s) Administered   • INFLUENZA 11/14/2024     Objective   BP (!) 134/104 (BP Location: Right arm, Patient Position: Sitting, Cuff Size: Large)   Pulse 75   Temp 97.8 °F (36.6 °C) (Tympanic)   Resp 16   Ht 5' 8\" (1.727 m)   Wt 101 kg (221 lb 12.8 oz)   SpO2 97%   BMI 33.72 kg/m²     Physical Exam  Constitutional:       Appearance: He is well-developed.   HENT:      Head: Normocephalic and atraumatic.      Right Ear: Tympanic membrane is erythematous.      Left Ear: Tympanic membrane is erythematous.      Mouth/Throat:      Pharynx: Posterior oropharyngeal erythema present.   Eyes:      Conjunctiva/sclera: Conjunctivae normal.      Pupils: Pupils are equal, round, and reactive to light.   Cardiovascular:      Rate and Rhythm: Normal rate.   Pulmonary:      Effort: Pulmonary effort is normal.   Neurological:      Mental Status: He is alert and oriented to person, place, and time.   Psychiatric:         Behavior: Behavior normal.         Thought Content: Thought content normal.         Judgment: Judgment normal.         "

## 2025-04-17 DIAGNOSIS — J02.9 SORE THROAT: ICD-10-CM

## 2025-04-17 DIAGNOSIS — J02.9 SORE THROAT: Primary | ICD-10-CM

## 2025-04-18 RX ORDER — CEFDINIR 300 MG/1
300 CAPSULE ORAL EVERY 12 HOURS SCHEDULED
Qty: 14 CAPSULE | Refills: 0 | Status: SHIPPED | OUTPATIENT
Start: 2025-04-18 | End: 2025-04-25

## 2025-04-18 RX ORDER — CEFDINIR 300 MG/1
300 CAPSULE ORAL EVERY 12 HOURS SCHEDULED
Qty: 20 CAPSULE | Refills: 0 | Status: SHIPPED | OUTPATIENT
Start: 2025-04-18 | End: 2025-04-28

## 2025-04-18 NOTE — TELEPHONE ENCOUNTER
Patients mom calling to check status made aware medication, made aware medication is pending. Mom states that Dr Juancarlos Hill was willing to refill medication for patient.

## 2025-04-23 ENCOUNTER — OFFICE VISIT (OUTPATIENT)
Dept: FAMILY MEDICINE CLINIC | Facility: CLINIC | Age: 45
End: 2025-04-23
Payer: MEDICARE

## 2025-04-23 VITALS
BODY MASS INDEX: 33.34 KG/M2 | HEART RATE: 80 BPM | SYSTOLIC BLOOD PRESSURE: 128 MMHG | RESPIRATION RATE: 16 BRPM | HEIGHT: 68 IN | TEMPERATURE: 97.8 F | DIASTOLIC BLOOD PRESSURE: 84 MMHG | WEIGHT: 220 LBS | OXYGEN SATURATION: 99 %

## 2025-04-23 DIAGNOSIS — R51.9 PRESSURE IN HEAD: ICD-10-CM

## 2025-04-23 DIAGNOSIS — I10 PRIMARY HYPERTENSION: Primary | ICD-10-CM

## 2025-04-23 DIAGNOSIS — J34.9 SINUS DISEASE: ICD-10-CM

## 2025-04-23 DIAGNOSIS — J02.9 SORE THROAT: ICD-10-CM

## 2025-04-23 PROCEDURE — G2211 COMPLEX E/M VISIT ADD ON: HCPCS | Performed by: FAMILY MEDICINE

## 2025-04-23 PROCEDURE — 99214 OFFICE O/P EST MOD 30 MIN: CPT | Performed by: FAMILY MEDICINE

## 2025-04-23 RX ORDER — METHYLPREDNISOLONE 4 MG/1
TABLET ORAL
Qty: 21 EACH | Refills: 0 | Status: SHIPPED | OUTPATIENT
Start: 2025-04-23

## 2025-04-23 RX ORDER — FAMOTIDINE 20 MG/1
20 TABLET, FILM COATED ORAL 2 TIMES DAILY
Qty: 60 TABLET | Refills: 0 | Status: SHIPPED | OUTPATIENT
Start: 2025-04-23

## 2025-04-23 NOTE — PROGRESS NOTES
Name: Jitendra Silva      : 1980      MRN: [id number]  Encounter Provider: Deirdre Hill MD  Encounter Date: 2025   Encounter department: [facility]    Assessment & Plan  Primary hypertension  Hypertension  Much improved today on the visit  No changes to be made       Sore throat  Curious to see if patient's sinus disease improved after the polyp removal.  I recommend a CT of the sinuses given that the patient has had 2 rounds of antibiotics and still has discomfort.  Seems like the pain radiates from his sore throat all the way to his ear into the head causing pressure.  Recommend Pepcid twice a day, Allegra daily, Medrol pack, continue cefdinir if no improvement and CT demonstrates normal then recommend following up with ENT  Orders:  •  famotidine (PEPCID) 20 mg tablet; Take 1 tablet (20 mg total) by mouth 2 (two) times a day  •  methylPREDNISolone 4 MG tablet therapy pack; Use as directed on package    Pressure in head    Orders:  •  famotidine (PEPCID) 20 mg tablet; Take 1 tablet (20 mg total) by mouth 2 (two) times a day  •  methylPREDNISolone 4 MG tablet therapy pack; Use as directed on package  •  CT sinus wo contrast; Future    Sinus disease    Orders:  •  CT sinus wo contrast; Future         History of Present Illness     HPI  44-year-old male presenting to the office.  States that 2 years ago he swallowed debris when he was working and since then has had issues.  States that he has significant sore throat that radiates to his ear and into his sinus.  Patient states that right now the cefdinir is not helping him as much as it usually was.  He did  a 30-day of Allegra and has been using that since we last discussed.  Patient states that he does see ENT.  And will be seeing a dentist today to discuss further.  Is taking his blood pressure medication  Review of Systems   Constitutional:  Negative for activity change, appetite change, chills,  fatigue and fever.   HENT:  Positive for sinus pressure, sinus pain and sore throat. Negative for congestion.    Respiratory:  Negative for cough, chest tightness and shortness of breath.    Cardiovascular:  Negative for chest pain and leg swelling.   Gastrointestinal:  Negative for abdominal distention, abdominal pain, constipation, diarrhea, nausea and vomiting.   All other systems reviewed and are negative.    Past Medical History:   Diagnosis Date   • Chest pain    • Headache(784.0)    • High blood pressure    • High cholesterol    • Hypertension    • Increased storage iron    • Kidney stone    • Lyme disease    • Migraine      Past Surgical History:   Procedure Laterality Date   • COLONOSCOPY     • FL RETROGRADE PYELOGRAM  02/18/2024   • SD CYSTO/URETERO W/LITHOTRIPSY &INDWELL STENT INSRT Left 02/18/2024    Procedure: CYSTOSCOPY URETEROSCOPY WITH basket stone extraction,  RETROGRADE PYELOGRAM AND INSERTION STENT URETERAL;  Surgeon: Trevon Pelayo MD;  Location: [facility];  Service: Urology   • SD NSL/SINUS NDSC MAX ANTROST W/RMVL TISS MAX SINUS Left 12/12/2024    Procedure: LEFT MAXILLARY ENDOSCOPIC ANTROSTOMY WITH REMOVAL OF TISSUE;  Surgeon: Tha Brown MD;  Location: [facility] OR;  Service: ENT   • SD TONSILLECTOMY & ADENOIDECTOMY AGE 11/> N/A 09/21/2023    Procedure: TONSILLECTOMY;  Surgeon: Tha Brown MD;  Location: [facility];  Service: ENT   • TONSILLECTOMY       Family History   Problem Relation Age of Onset   • Cancer Mother         Breast cancer   • Arthritis Mother    • Cancer Father    • Early death Father         Lymphoma     Social History     Tobacco Use   • Smoking status: Never     Passive exposure: Never   • Smokeless tobacco: Never   • Tobacco comments:     Occasionally   Vaping Use   • Vaping status: Never Used   Substance and Sexual Activity   • Alcohol use: Yes     Comment: Occasionally   • Drug use: Never   • Sexual activity: Not Currently     Birth control/protection:  "None     Current Outpatient Medications on File Prior to Visit   Medication Sig   • amLODIPine (NORVASC) 10 mg tablet Take 1 tablet (10 mg total) by mouth daily   • Bromfenac Sodium 0.075 % SOLN    • cefdinir (OMNICEF) 300 mg capsule Take 1 capsule (300 mg total) by mouth every 12 (twelve) hours for 7 days   • ergocalciferol (VITAMIN D2) 50,000 units take 1 capsule by mouth every week with food   • lisinopril (ZESTRIL) 20 mg tablet Take 1 tablet (20 mg total) by mouth daily   • cefdinir (OMNICEF) 300 mg capsule Take 1 capsule (300 mg total) by mouth every 12 (twelve) hours for 10 days     Allergies   Allergen Reactions   • Penicillins Hives     Reaction Date: 11Feb2005;      • Shellfish-Derived Products - Food Allergy Itching     Immunization History   Administered Date(s) Administered   • INFLUENZA 11/14/2024     Objective   /84 (BP Location: Right arm, Patient Position: Sitting, Cuff Size: Standard)   Pulse 80   Temp 97.8 °F (36.6 °C) (Temporal)   Resp 16   Ht 5' 8\" (1.727 m)   Wt 99.8 kg (220 lb)   SpO2 99%   BMI 33.45 kg/m²     Physical Exam  Constitutional:       Appearance: He is well-developed.   HENT:      Head: Normocephalic and atraumatic.      Right Ear: Tympanic membrane, ear canal and external ear normal.      Left Ear: Tympanic membrane, ear canal and external ear normal.      Nose: No congestion.      Mouth/Throat:      Pharynx: No oropharyngeal exudate or posterior oropharyngeal erythema.   Eyes:      Conjunctiva/sclera: Conjunctivae normal.      Pupils: Pupils are equal, round, and reactive to light.   Pulmonary:      Effort: Pulmonary effort is normal.   Neurological:      Mental Status: He is alert and oriented to person, place, and time.   Psychiatric:         Behavior: Behavior normal.         Thought Content: Thought content normal.         Judgment: Judgment normal.         "

## 2025-04-29 ENCOUNTER — HOSPITAL ENCOUNTER (OUTPATIENT)
Dept: RADIOLOGY | Facility: HOSPITAL | Age: 45
Discharge: HOME | End: 2025-04-29
Attending: FAMILY MEDICINE
Payer: MEDICARE

## 2025-04-29 DIAGNOSIS — R51.9 PRESSURE IN HEAD: ICD-10-CM

## 2025-04-29 DIAGNOSIS — J34.9 SINUS DISEASE: ICD-10-CM

## 2025-04-29 PROCEDURE — 70486 CT MAXILLOFACIAL W/O DYE: CPT

## 2025-04-30 ENCOUNTER — RESULTS FOLLOW-UP (OUTPATIENT)
Dept: FAMILY MEDICINE CLINIC | Facility: CLINIC | Age: 45
End: 2025-04-30

## 2025-05-01 ENCOUNTER — OFFICE VISIT (OUTPATIENT)
Dept: OTOLARYNGOLOGY | Facility: CLINIC | Age: 45
End: 2025-05-01
Payer: MEDICARE

## 2025-05-01 VITALS
WEIGHT: 220 LBS | HEIGHT: 68 IN | TEMPERATURE: 97 F | BODY MASS INDEX: 33.34 KG/M2 | HEART RATE: 71 BPM | OXYGEN SATURATION: 97 %

## 2025-05-01 DIAGNOSIS — J34.1 MAXILLARY SINUS CYST: ICD-10-CM

## 2025-05-01 DIAGNOSIS — R07.0 CHRONIC THROAT PAIN: Primary | ICD-10-CM

## 2025-05-01 DIAGNOSIS — G89.29 CHRONIC THROAT PAIN: Primary | ICD-10-CM

## 2025-05-01 PROCEDURE — 99214 OFFICE O/P EST MOD 30 MIN: CPT | Performed by: OTOLARYNGOLOGY

## 2025-05-01 RX ORDER — DOXYCYCLINE 100 MG/1
100 CAPSULE ORAL EVERY 12 HOURS SCHEDULED
Qty: 20 CAPSULE | Refills: 0 | Status: SHIPPED | OUTPATIENT
Start: 2025-05-01 | End: 2025-05-11

## 2025-05-01 NOTE — PROGRESS NOTES
"Assessment/Plan:  The patient felt pretty well after his course of abx in October, and after his sinus surgery.  His left pharyngeal pain (which radiates to the left side of his head, etc), has recurred, and does not respond to the cefdinir this time.  He has a focal area of pain on the posterior pharyngeal wall on the left, medial to the tonsillar fossa, which seems to be the source of his pain.  It's possible that he has a focal neuroma in that spot, which is causing his pain.  I would consider excising that spot, since the patient's symptoms have been so debilitating for so long.  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  Trial of another abx first.  F/u in 2 wks.      Diagnosis ICD-10-CM Associated Orders   1. Chronic throat pain  R07.0     G89.29       2. 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 male.    Pt returns with c/o a 1-month history of left-sided throat pain radiating to the left side of his head.  This seemed to improve on abx in the past, but he has been on cefdinir x 23 days, and a medrol dose pack, with only some improvement.  Repeat CT of the sinuses showed recurrence of the cyst on the floor of the left maxillary sinus, but no sign of infection.        The following portions of the patient's history were reviewed and updated as appropriate: allergies, current medications, past family history, past medical history, past social history, past surgical history and problem list.    Review of Systems      Objective:      Pulse 71   Temp (!) 97 °F (36.1 °C) (Core)   Ht 5' 8\" (1.727 m)   Wt 99.8 kg (220 lb)   SpO2 97%   BMI 33.45 kg/m²          Physical Exam  Constitutional:       Appearance: He is well-developed.   HENT:      Head: Normocephalic and atraumatic.      Right Ear: Tympanic membrane, ear canal and external ear normal. No drainage. No middle ear effusion.      Left Ear: Tympanic " membrane, ear canal and external ear normal. No drainage.  No middle ear effusion.      Nose: Nose normal.      Mouth/Throat:      Pharynx: Uvula midline. No oropharyngeal exudate.      Tonsils: 0 on the right. 0 on the left.   Neck:      Thyroid: No thyroid mass or thyromegaly.      Trachea: Trachea normal. No tracheal deviation.   Lymphadenopathy:      Cervical: No cervical adenopathy.   Neurological:      Mental Status: He is alert.

## 2025-05-08 ENCOUNTER — TELEPHONE (OUTPATIENT)
Age: 45
End: 2025-05-08

## 2025-05-08 ENCOUNTER — OFFICE VISIT (OUTPATIENT)
Age: 45
End: 2025-05-08
Payer: MEDICARE

## 2025-05-08 VITALS
WEIGHT: 213 LBS | OXYGEN SATURATION: 97 % | BODY MASS INDEX: 32.28 KG/M2 | HEIGHT: 68 IN | SYSTOLIC BLOOD PRESSURE: 127 MMHG | DIASTOLIC BLOOD PRESSURE: 80 MMHG | TEMPERATURE: 96.8 F | HEART RATE: 76 BPM

## 2025-05-08 DIAGNOSIS — R10.31 RIGHT INGUINAL PAIN: Primary | ICD-10-CM

## 2025-05-08 DIAGNOSIS — Z01.818 PREOPERATIVE EXAMINATION: ICD-10-CM

## 2025-05-08 DIAGNOSIS — K40.90 LEFT INGUINAL HERNIA: ICD-10-CM

## 2025-05-08 PROCEDURE — 99214 OFFICE O/P EST MOD 30 MIN: CPT | Performed by: SURGERY

## 2025-05-08 RX ORDER — LEVOFLOXACIN 5 MG/ML
750 INJECTION, SOLUTION INTRAVENOUS ONCE
OUTPATIENT
Start: 2025-05-08 | End: 2025-05-08

## 2025-05-08 NOTE — PROGRESS NOTES
Name: Jitendra Silva      : 1980      MRN: [id number]  Encounter Provider: Demarcus Ramirez MD  Encounter Date: 2025   Encounter department: [facility]  :  Assessment & Plan  Right inguinal pain    Orders:    CBC and Platelet; Future    Comprehensive metabolic panel; Future    Left inguinal hernia    Orders:    CBC and Platelet; Future    Comprehensive metabolic panel; Future    Preoperative examination    Orders:    CBC and Platelet; Future    Comprehensive metabolic panel; Future    Diagnosis and plan of laparoscopic TEP explained at length and all questions answered.  Patient desires to proceed.  Will order CMP and CBC to complete workup    History of Present Illness   HPI  Jitendra Silva is a 44 y.o. male who presents with a diagnosis of small bilateral inguinal hernia seen on CAT scan.  He had a surgical consultation with my partner, Dr. Maggie Ham, in 2025 where these findings were discussed.  He was referred to me for further consideration regarding laparoscopic TEP bilateral inguinal hernia repair.    He is in overall excellent health with his only chronic medical condition being hypertension which is under control.          Review of Systems   Constitutional:  Negative for chills and fever.   Respiratory: Negative.     Cardiovascular: Negative.    Gastrointestinal: Negative.    Genitourinary: Negative.    Neurological: Negative.    Hematological: Negative.    All other systems reviewed and are negative.    Medical History Reviewed by provider this encounter:     .  Past Medical History   Past Medical History:   Diagnosis Date    Chest pain     Headache(784.0)     High blood pressure     High cholesterol     Hypertension     Increased storage iron     Kidney stone     Lyme disease     Migraine      Past Surgical History:   Procedure Laterality Date    COLONOSCOPY      FL RETROGRADE PYELOGRAM  2024    [place] CYSTO/URETERO W/LITHOTRIPSY &INDWELL STENT INSRT  Left 02/18/2024    Procedure: CYSTOSCOPY URETEROSCOPY WITH basket stone extraction,  RETROGRADE PYELOGRAM AND INSERTION STENT URETERAL;  Surgeon: Trevon Pelayo MD;  Location: [facility];  Service: Urology    MA NSL/SINUS NDSC MAX ANTROST W/RMVL TISS MAX SINUS Left 12/12/2024    Procedure: LEFT MAXILLARY ENDOSCOPIC ANTROSTOMY WITH REMOVAL OF TISSUE;  Surgeon: Tha Brown MD;  Location: [facility];  Service: ENT    MA TONSILLECTOMY & ADENOIDECTOMY AGE 11/> N/A 09/21/2023    Procedure: TONSILLECTOMY;  Surgeon: Tha Brown MD;  Location: [facility];  Service: ENT    TONSILLECTOMY       Family History   Problem Relation Age of Onset    Cancer Mother         Breast cancer    Arthritis Mother     Cancer Father     Early death Father         Lymphoma      reports that he has never smoked. He has never been exposed to tobacco smoke. He has never used smokeless tobacco. He reports current alcohol use. He reports that he does not use drugs.  Current Outpatient Medications   Medication Instructions    amLODIPine (NORVASC) 10 mg, Oral, Daily    Bromfenac Sodium 0.075 % SOLN     doxycycline hyclate (VIBRAMYCIN) 100 mg, Oral, Every 12 hours scheduled    ergocalciferol (VITAMIN D2) 50,000 units take 1 capsule by mouth every week with food    famotidine (PEPCID) 20 mg, Oral, 2 times daily    lisinopril (ZESTRIL) 20 mg, Oral, Daily    methylPREDNISolone 4 MG tablet therapy pack Use as directed on package     Allergies   Allergen Reactions    Penicillins Hives     Reaction Date: 11Feb2005;       Shellfish-Derived Products - Food Allergy Itching      Current Outpatient Medications on File Prior to Visit   Medication Sig Dispense Refill    amLODIPine (NORVASC) 10 mg tablet Take 1 tablet (10 mg total) by mouth daily 90 tablet 3    Bromfenac Sodium 0.075 % SOLN       doxycycline hyclate (VIBRAMYCIN) 100 mg capsule Take 1 capsule (100 mg total) by mouth every 12 (twelve) hours for 10 days 20 capsule 0    ergocalciferol  (VITAMIN D2) 50,000 units take 1 capsule by mouth every week with food      famotidine (PEPCID) 20 mg tablet Take 1 tablet (20 mg total) by mouth 2 (two) times a day 60 tablet 0    lisinopril (ZESTRIL) 20 mg tablet Take 1 tablet (20 mg total) by mouth daily 90 tablet 3    methylPREDNISolone 4 MG tablet therapy pack Use as directed on package (Patient not taking: Reported on 5/1/2025) 21 each 0     No current facility-administered medications on file prior to visit.      Social History     Tobacco Use    Smoking status: Never     Passive exposure: Never    Smokeless tobacco: Never    Tobacco comments:     Occasionally   Vaping Use    Vaping status: Never Used   Substance and Sexual Activity    Alcohol use: Yes     Comment: Occasionally    Drug use: Never    Sexual activity: Not Currently     Birth control/protection: None        Objective   There were no vitals taken for this visit.     Physical Exam  Vitals reviewed.   Constitutional:       General: He is not in acute distress.     Appearance: Normal appearance.   Cardiovascular:      Rate and Rhythm: Normal rate and regular rhythm.   Pulmonary:      Effort: Pulmonary effort is normal. No respiratory distress.      Breath sounds: Normal breath sounds.   Abdominal:      General: Abdomen is flat. There is no distension.      Palpations: Abdomen is soft.      Comments: Small, right greater than left, inguinal hernias.  Could also represent cord lipomas.   Musculoskeletal:         General: Normal range of motion.   Skin:     General: Skin is warm and dry.   Neurological:      General: No focal deficit present.      Mental Status: He is alert.   Psychiatric:         Mood and Affect: Mood normal.

## 2025-05-12 ENCOUNTER — APPOINTMENT (OUTPATIENT)
Dept: LAB | Facility: CLINIC | Age: 45
End: 2025-05-12
Attending: INTERNAL MEDICINE
Payer: MEDICARE

## 2025-05-12 ENCOUNTER — APPOINTMENT (OUTPATIENT)
Dept: RADIOLOGY | Facility: CLINIC | Age: 45
End: 2025-05-12
Payer: MEDICARE

## 2025-05-12 DIAGNOSIS — E78.5 DYSLIPIDEMIA: ICD-10-CM

## 2025-05-12 DIAGNOSIS — K40.90 LEFT INGUINAL HERNIA: ICD-10-CM

## 2025-05-12 DIAGNOSIS — R10.31 RIGHT INGUINAL PAIN: ICD-10-CM

## 2025-05-12 DIAGNOSIS — Z01.818 PREOPERATIVE EXAMINATION: ICD-10-CM

## 2025-05-12 DIAGNOSIS — I10 PRIMARY HYPERTENSION: ICD-10-CM

## 2025-05-12 LAB
ALBUMIN SERPL BCG-MCNC: 4.6 G/DL (ref 3.5–5)
ALP SERPL-CCNC: 91 U/L (ref 34–104)
ALT SERPL W P-5'-P-CCNC: 23 U/L (ref 7–52)
ANION GAP SERPL CALCULATED.3IONS-SCNC: 8 MMOL/L (ref 4–13)
AST SERPL W P-5'-P-CCNC: 16 U/L (ref 13–39)
BILIRUB SERPL-MCNC: 0.47 MG/DL (ref 0.2–1)
BUN SERPL-MCNC: 22 MG/DL (ref 5–25)
CALCIUM SERPL-MCNC: 9 MG/DL (ref 8.4–10.2)
CHLORIDE SERPL-SCNC: 103 MMOL/L (ref 96–108)
CHOLEST SERPL-MCNC: 214 MG/DL (ref ?–200)
CO2 SERPL-SCNC: 25 MMOL/L (ref 21–32)
CREAT SERPL-MCNC: 0.83 MG/DL (ref 0.6–1.3)
ERYTHROCYTE [DISTWIDTH] IN BLOOD BY AUTOMATED COUNT: 13.3 % (ref 11.6–15.1)
GFR SERPL CREATININE-BSD FRML MDRD: 106 ML/MIN/1.73SQ M
GLUCOSE P FAST SERPL-MCNC: 89 MG/DL (ref 65–99)
HCT VFR BLD AUTO: 48.3 % (ref 36.5–49.3)
HDLC SERPL-MCNC: 32 MG/DL
HGB BLD-MCNC: 15.5 G/DL (ref 12–17)
LDLC SERPL CALC-MCNC: 134 MG/DL (ref 0–100)
MCH RBC QN AUTO: 28.8 PG (ref 26.8–34.3)
MCHC RBC AUTO-ENTMCNC: 32.1 G/DL (ref 31.4–37.4)
MCV RBC AUTO: 90 FL (ref 82–98)
NONHDLC SERPL-MCNC: 182 MG/DL
PLATELET # BLD AUTO: 261 THOUSANDS/UL (ref 149–390)
PMV BLD AUTO: 10.3 FL (ref 8.9–12.7)
POTASSIUM SERPL-SCNC: 4.4 MMOL/L (ref 3.5–5.3)
PROT SERPL-MCNC: 7.4 G/DL (ref 6.4–8.4)
RBC # BLD AUTO: 5.39 MILLION/UL (ref 3.88–5.62)
SODIUM SERPL-SCNC: 136 MMOL/L (ref 135–147)
TRIGL SERPL-MCNC: 241 MG/DL (ref ?–150)
WBC # BLD AUTO: 9.91 THOUSAND/UL (ref 4.31–10.16)

## 2025-05-12 PROCEDURE — 80061 LIPID PANEL: CPT

## 2025-05-12 PROCEDURE — 80053 COMPREHEN METABOLIC PANEL: CPT

## 2025-05-12 PROCEDURE — 85027 COMPLETE CBC AUTOMATED: CPT

## 2025-05-12 PROCEDURE — 36415 COLL VENOUS BLD VENIPUNCTURE: CPT

## 2025-05-14 ENCOUNTER — APPOINTMENT (OUTPATIENT)
Dept: RADIOLOGY | Facility: CLINIC | Age: 45
End: 2025-05-14
Attending: SPECIALIST
Payer: MEDICARE

## 2025-05-14 ENCOUNTER — APPOINTMENT (OUTPATIENT)
Dept: RADIOLOGY | Facility: CLINIC | Age: 45
End: 2025-05-14
Payer: MEDICARE

## 2025-05-14 ENCOUNTER — RESULTS FOLLOW-UP (OUTPATIENT)
Dept: CARDIOLOGY CLINIC | Facility: CLINIC | Age: 45
End: 2025-05-14

## 2025-05-14 DIAGNOSIS — E78.5 DYSLIPIDEMIA: Primary | ICD-10-CM

## 2025-05-14 PROCEDURE — 74018 RADEX ABDOMEN 1 VIEW: CPT

## 2025-05-14 RX ORDER — ROSUVASTATIN CALCIUM 10 MG/1
10 TABLET, COATED ORAL DAILY
Qty: 90 TABLET | Refills: 3 | Status: SHIPPED | OUTPATIENT
Start: 2025-05-14

## 2025-05-15 DIAGNOSIS — J02.9 SORE THROAT: ICD-10-CM

## 2025-05-15 DIAGNOSIS — R51.9 PRESSURE IN HEAD: ICD-10-CM

## 2025-05-15 RX ORDER — FAMOTIDINE 20 MG/1
20 TABLET, FILM COATED ORAL 2 TIMES DAILY
Qty: 60 TABLET | Refills: 0 | Status: SHIPPED | OUTPATIENT
Start: 2025-05-15

## 2025-05-22 ENCOUNTER — OFFICE VISIT (OUTPATIENT)
Dept: OTOLARYNGOLOGY | Facility: CLINIC | Age: 45
End: 2025-05-22

## 2025-05-22 VITALS
HEART RATE: 72 BPM | WEIGHT: 203 LBS | BODY MASS INDEX: 30.77 KG/M2 | OXYGEN SATURATION: 98 % | HEIGHT: 68 IN | TEMPERATURE: 98 F

## 2025-05-22 DIAGNOSIS — G89.29 CHRONIC THROAT PAIN: Primary | ICD-10-CM

## 2025-05-22 DIAGNOSIS — R07.0 CHRONIC THROAT PAIN: Primary | ICD-10-CM

## 2025-05-22 NOTE — PROGRESS NOTES
"Assessment/Plan:  Ultimately, I believe the patient's symptoms are due to a focal neuropathy, with the epicenter in the left posterolateral pharyngeal wall.  We agreed to try a nerve block with lidocaine 2%, of which I injected 2ml into the area.  If this helps, then I will repeat it with either dexamethasone or Depo-Medrol.  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  F/u in 1 wk.      Diagnosis ICD-10-CM Associated Orders   1. Chronic throat pain  R07.0     G89.29              Subjective:      Patient ID: Jitendra Silva is a 44 y.o. male.    F/u for chronic left throat and facial pain.  Pt didn't notice any improvement on abx this time, doxycycline nor Levaquin.  Pt points to a specific spot in his pharynx, on the left, which is the focus of his pain.  He also notes that when wind blows on his left ear, it exacerbates his pain and radiates up the left side of his head.        The following portions of the patient's history were reviewed and updated as appropriate: allergies, current medications, past family history, past medical history, past social history, past surgical history and problem list.    Review of Systems      Objective:      Pulse 72   Temp 98 °F (36.7 °C) (Temporal)   Ht 5' 8\" (1.727 m)   Wt 92.1 kg (203 lb)   SpO2 98%   BMI 30.87 kg/m²          Physical Exam  Constitutional:       Appearance: He is well-developed.   HENT:      Head: Normocephalic and atraumatic.      Right Ear: Tympanic membrane, ear canal and external ear normal. No drainage. No middle ear effusion.      Left Ear: Tympanic membrane, ear canal and external ear normal. No drainage.  No middle ear effusion.      Nose: Nose normal.      Mouth/Throat:      Pharynx: Uvula midline. No oropharyngeal exudate.      Tonsils: 0 on the right. 0 on the left.     Neck:      Thyroid: No thyroid mass or thyromegaly.      Trachea: Trachea normal. No tracheal deviation. "   Lymphadenopathy:      Cervical: No cervical adenopathy.     Neurological:      Mental Status: He is alert.

## 2025-05-29 ENCOUNTER — OFFICE VISIT (OUTPATIENT)
Dept: OTOLARYNGOLOGY | Facility: CLINIC | Age: 45
End: 2025-05-29
Payer: MEDICARE

## 2025-05-29 VITALS — WEIGHT: 203 LBS | HEIGHT: 68 IN | BODY MASS INDEX: 30.77 KG/M2

## 2025-05-29 DIAGNOSIS — R51.9 CHRONIC NONINTRACTABLE HEADACHE, UNSPECIFIED HEADACHE TYPE: ICD-10-CM

## 2025-05-29 DIAGNOSIS — G89.29 CHRONIC THROAT PAIN: Primary | ICD-10-CM

## 2025-05-29 DIAGNOSIS — R07.0 CHRONIC THROAT PAIN: Primary | ICD-10-CM

## 2025-05-29 DIAGNOSIS — G89.29 CHRONIC NONINTRACTABLE HEADACHE, UNSPECIFIED HEADACHE TYPE: ICD-10-CM

## 2025-05-29 PROCEDURE — 99213 OFFICE O/P EST LOW 20 MIN: CPT | Performed by: OTOLARYNGOLOGY

## 2025-05-29 NOTE — PROGRESS NOTES
"Assessment/Plan:  Some superficial improvement with local lidocaine injection, but it is not getting to the root of the problem.  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  I have referred the patient to Pain Management, as some of those practitioners perform that service.      Diagnosis ICD-10-CM Associated Orders   1. Chronic throat pain  R07.0 Ambulatory referral to Spine & Pain Management    G89.29       2. Chronic nonintractable headache, unspecified headache type  R51.9 Ambulatory referral to Spine & Pain Management    G89.29              Subjective:      Patient ID: Jitendra Silva is a 44 y.o. male.    F/u from left posterolateral pharyngeal wall injection.  Pt notes that the lidocaine injection caused some superficial numbness, but didn't help the deeper pain.        The following portions of the patient's history were reviewed and updated as appropriate: allergies, current medications, past family history, past medical history, past social history, past surgical history and problem list.    Review of Systems      Objective:      Ht 5' 8\" (1.727 m)   Wt 92.1 kg (203 lb)   BMI 30.87 kg/m²          Physical Exam  Constitutional:       Appearance: He is well-developed.   HENT:      Head: Normocephalic and atraumatic.      Right Ear: Tympanic membrane, ear canal and external ear normal. No drainage. No middle ear effusion.      Left Ear: Tympanic membrane, ear canal and external ear normal. No drainage.  No middle ear effusion.      Nose: Nose normal.      Mouth/Throat:      Pharynx: Uvula midline. No oropharyngeal exudate.      Tonsils: 0 on the right. 0 on the left.     Neck:      Thyroid: No thyroid mass or thyromegaly.      Trachea: Trachea normal. No tracheal deviation.   Lymphadenopathy:      Cervical: No cervical adenopathy.     Neurological:      Mental Status: He is alert.         "

## 2025-06-10 DIAGNOSIS — J02.9 SORE THROAT: ICD-10-CM

## 2025-06-10 DIAGNOSIS — R51.9 PRESSURE IN HEAD: ICD-10-CM

## 2025-06-10 RX ORDER — FAMOTIDINE 20 MG/1
20 TABLET, FILM COATED ORAL 2 TIMES DAILY
Qty: 60 TABLET | Refills: 5 | Status: SHIPPED | OUTPATIENT
Start: 2025-06-10

## 2025-06-19 NOTE — PRE-PROCEDURE INSTRUCTIONS
Pre-Surgery Instructions:   Medication Instructions    amLODIPine (NORVASC) 10 mg tablet Take day of surgery.    ergocalciferol (VITAMIN D2) 50,000 units Stop taking 7 days prior to surgery.    famotidine (PEPCID) 20 mg tablet Take day of surgery.    lisinopril (ZESTRIL) 20 mg tablet Hold day of surgery.    rosuvastatin (CRESTOR) 10 MG tablet Take day of surgery.   Medication instructions for day of surgery reviewed. Please take all instructed medications with only a sip of water. Please do not take any over the counter (non-prescribed) vitamins or supplements for one week prior to date of surgery.      You will receive a call one business day prior to surgery with an arrival time and hospital directions. If your surgery is scheduled on a Monday, the hospital will be calling you on the Friday prior to your surgery. If you have not heard from anyone by 8pm, please call the hospital supervisor through the hospital  at [phone]. ([place] [phone] or [place] [phone]).    Do not eat or drink anything after midnight the night before your surgery, including candy, mints, lifesavers, or chewing gum. Do not drink alcohol 24hrs before your surgery. Try not to smoke at least 24hrs before your surgery.       Follow the pre surgery showering instructions as listed in the “My Surgical Experience Booklet” or otherwise provided by your surgeon's office. Do not use a blade to shave the surgical area 1 week before surgery. It is okay to use a clean electric clippers up to 24 hours before surgery. Do not apply any lotions, creams, including makeup, cologne, deodorant, or perfumes after showering on the day of your surgery. Do not use dry shampoo, hair spray, hair gel, or any type of hair products.     No contact lenses, eye make-up, or artificial eyelashes. Remove nail polish, including gel polish, and any artificial, gel, or acrylic nails if possible. Remove all jewelry including rings and body piercing  jewelry.     Wear causal clothing that is easy to take on and off. Consider your type of surgery.    Keep any valuables, jewelry, piercings at home. Please bring any specially ordered equipment (sling, braces) if indicated.    Arrange for a responsible person to drive you to and from the hospital on the day of your surgery. Please confirm the visitor policy for the day of your procedure when you receive your phone call with an arrival time.     Call the surgeon's office with any new illnesses, exposures, or additional questions prior to surgery.    Please reference your “My Surgical Experience Booklet” for additional information to prepare for your upcoming surgery.    Pt. Verbalized an understanding of all instructions reviewed and offers no concerns at this time.Pt. Verbalized an understanding of all instructions reviewed and offers no concerns at this time.

## 2025-06-27 ENCOUNTER — ANESTHESIA EVENT (OUTPATIENT)
Dept: PERIOP | Facility: HOSPITAL | Age: 45
End: 2025-06-27
Payer: MEDICARE

## 2025-06-27 ENCOUNTER — ANESTHESIA (OUTPATIENT)
Dept: PERIOP | Facility: HOSPITAL | Age: 45
End: 2025-06-27
Payer: MEDICARE

## 2025-06-27 ENCOUNTER — HOSPITAL ENCOUNTER (OUTPATIENT)
Facility: HOSPITAL | Age: 45
Setting detail: OUTPATIENT SURGERY
Discharge: HOME | End: 2025-06-27
Attending: SURGERY | Admitting: SURGERY
Payer: MEDICARE

## 2025-06-27 VITALS
HEART RATE: 86 BPM | DIASTOLIC BLOOD PRESSURE: 57 MMHG | WEIGHT: 217.15 LBS | HEIGHT: 68 IN | OXYGEN SATURATION: 94 % | SYSTOLIC BLOOD PRESSURE: 102 MMHG | RESPIRATION RATE: 18 BRPM | TEMPERATURE: 98.1 F | BODY MASS INDEX: 32.91 KG/M2

## 2025-06-27 DIAGNOSIS — R10.31 RIGHT INGUINAL PAIN: Primary | ICD-10-CM

## 2025-06-27 DIAGNOSIS — K40.20 NON-RECURRENT BILATERAL INGUINAL HERNIA WITHOUT OBSTRUCTION OR GANGRENE: ICD-10-CM

## 2025-06-27 PROCEDURE — 49650 LAP ING HERNIA REPAIR INIT: CPT | Performed by: PHYSICIAN ASSISTANT

## 2025-06-27 PROCEDURE — C1727 CATH, BAL TIS DIS, NON-VAS: HCPCS | Performed by: SURGERY

## 2025-06-27 PROCEDURE — NC001 PR NO CHARGE: Performed by: SURGERY

## 2025-06-27 PROCEDURE — 49650 LAP ING HERNIA REPAIR INIT: CPT | Performed by: SURGERY

## 2025-06-27 PROCEDURE — C1781 MESH (IMPLANTABLE): HCPCS | Performed by: SURGERY

## 2025-06-27 DEVICE — 3DMAX MESH, 8.5 CM X 13.7 CM (3.3" X 5.4"), LEFT, MEDIUM
Type: IMPLANTABLE DEVICE | Site: INGUINAL | Status: FUNCTIONAL
Brand: 3DMAX

## 2025-06-27 DEVICE — 3DMAX MESH, 8.5 CM X 13.7 CM (3.3" X 5.4"), RIGHT, MEDIUM
Type: IMPLANTABLE DEVICE | Site: INGUINAL | Status: FUNCTIONAL
Brand: 3DMAX

## 2025-06-27 RX ORDER — METRONIDAZOLE 500 MG/100ML
500 INJECTION, SOLUTION INTRAVENOUS ONCE
Status: COMPLETED | OUTPATIENT
Start: 2025-06-27 | End: 2025-06-27

## 2025-06-27 RX ORDER — BUPIVACAINE HCL/EPINEPHRINE 0.5-1:200K
VIAL (ML) INJECTION AS NEEDED
Status: DISCONTINUED | OUTPATIENT
Start: 2025-06-27 | End: 2025-06-27 | Stop reason: HOSPADM

## 2025-06-27 RX ORDER — OXYCODONE AND ACETAMINOPHEN 5; 325 MG/1; MG/1
1 TABLET ORAL EVERY 8 HOURS PRN
Qty: 10 TABLET | Refills: 0 | Status: SHIPPED | OUTPATIENT
Start: 2025-06-27

## 2025-06-27 RX ORDER — PROPOFOL 10 MG/ML
INJECTION, EMULSION INTRAVENOUS AS NEEDED
Status: DISCONTINUED | OUTPATIENT
Start: 2025-06-27 | End: 2025-06-27

## 2025-06-27 RX ORDER — MIDAZOLAM HYDROCHLORIDE 2 MG/2ML
INJECTION, SOLUTION INTRAMUSCULAR; INTRAVENOUS AS NEEDED
Status: DISCONTINUED | OUTPATIENT
Start: 2025-06-27 | End: 2025-06-27

## 2025-06-27 RX ORDER — METOCLOPRAMIDE HYDROCHLORIDE 5 MG/ML
10 INJECTION INTRAMUSCULAR; INTRAVENOUS ONCE AS NEEDED
Status: DISCONTINUED | OUTPATIENT
Start: 2025-06-27 | End: 2025-06-27 | Stop reason: HOSPADM

## 2025-06-27 RX ORDER — FENTANYL CITRATE/PF 50 MCG/ML
50 SYRINGE (ML) INJECTION
Status: DISCONTINUED | OUTPATIENT
Start: 2025-06-27 | End: 2025-06-27 | Stop reason: HOSPADM

## 2025-06-27 RX ORDER — HYDROMORPHONE HCL/PF 1 MG/ML
0.5 SYRINGE (ML) INJECTION
Status: DISCONTINUED | OUTPATIENT
Start: 2025-06-27 | End: 2025-06-27 | Stop reason: HOSPADM

## 2025-06-27 RX ORDER — DEXAMETHASONE SODIUM PHOSPHATE 10 MG/ML
INJECTION, SOLUTION INTRAMUSCULAR; INTRAVENOUS AS NEEDED
Status: DISCONTINUED | OUTPATIENT
Start: 2025-06-27 | End: 2025-06-27

## 2025-06-27 RX ORDER — ONDANSETRON 2 MG/ML
INJECTION INTRAMUSCULAR; INTRAVENOUS AS NEEDED
Status: DISCONTINUED | OUTPATIENT
Start: 2025-06-27 | End: 2025-06-27

## 2025-06-27 RX ORDER — FENTANYL CITRATE 50 UG/ML
INJECTION, SOLUTION INTRAMUSCULAR; INTRAVENOUS AS NEEDED
Status: DISCONTINUED | OUTPATIENT
Start: 2025-06-27 | End: 2025-06-27

## 2025-06-27 RX ORDER — LEVOFLOXACIN 5 MG/ML
750 INJECTION, SOLUTION INTRAVENOUS ONCE
Status: COMPLETED | OUTPATIENT
Start: 2025-06-27 | End: 2025-06-27

## 2025-06-27 RX ORDER — LIDOCAINE HYDROCHLORIDE 20 MG/ML
INJECTION, SOLUTION EPIDURAL; INFILTRATION; INTRACAUDAL; PERINEURAL AS NEEDED
Status: DISCONTINUED | OUTPATIENT
Start: 2025-06-27 | End: 2025-06-27

## 2025-06-27 RX ORDER — MAGNESIUM HYDROXIDE 1200 MG/15ML
LIQUID ORAL AS NEEDED
Status: DISCONTINUED | OUTPATIENT
Start: 2025-06-27 | End: 2025-06-27 | Stop reason: HOSPADM

## 2025-06-27 RX ORDER — SODIUM CHLORIDE, SODIUM LACTATE, POTASSIUM CHLORIDE, CALCIUM CHLORIDE 600; 310; 30; 20 MG/100ML; MG/100ML; MG/100ML; MG/100ML
INJECTION, SOLUTION INTRAVENOUS CONTINUOUS PRN
Status: DISCONTINUED | OUTPATIENT
Start: 2025-06-27 | End: 2025-06-27

## 2025-06-27 RX ORDER — ROCURONIUM BROMIDE 10 MG/ML
INJECTION, SOLUTION INTRAVENOUS AS NEEDED
Status: DISCONTINUED | OUTPATIENT
Start: 2025-06-27 | End: 2025-06-27

## 2025-06-27 RX ADMIN — PHENYLEPHRINE HYDROCHLORIDE 40 MCG/MIN: 10 INJECTION INTRAVENOUS at 08:15

## 2025-06-27 RX ADMIN — LEVOFLOXACIN 750 MG: 750 INJECTION, SOLUTION INTRAVENOUS at 06:35

## 2025-06-27 RX ADMIN — SUGAMMADEX 200 MG: 100 INJECTION, SOLUTION INTRAVENOUS at 08:40

## 2025-06-27 RX ADMIN — METRONIDAZOLE: 500 INJECTION, SOLUTION INTRAVENOUS at 07:52

## 2025-06-27 RX ADMIN — ONDANSETRON 4 MG: 2 INJECTION INTRAMUSCULAR; INTRAVENOUS at 07:53

## 2025-06-27 RX ADMIN — PROPOFOL 200 MG: 10 INJECTION, EMULSION INTRAVENOUS at 07:34

## 2025-06-27 RX ADMIN — MIDAZOLAM 2 MG: 1 INJECTION INTRAMUSCULAR; INTRAVENOUS at 07:27

## 2025-06-27 RX ADMIN — FENTANYL CITRATE 50 MCG: 50 INJECTION, SOLUTION INTRAMUSCULAR; INTRAVENOUS at 07:48

## 2025-06-27 RX ADMIN — ROCURONIUM BROMIDE 50 MG: 10 INJECTION, SOLUTION INTRAVENOUS at 07:35

## 2025-06-27 RX ADMIN — SODIUM CHLORIDE, SODIUM LACTATE, POTASSIUM CHLORIDE, AND CALCIUM CHLORIDE: .6; .31; .03; .02 INJECTION, SOLUTION INTRAVENOUS at 07:22

## 2025-06-27 RX ADMIN — DEXAMETHASONE SODIUM PHOSPHATE 10 MG: 10 INJECTION, SOLUTION INTRAMUSCULAR; INTRAVENOUS at 07:53

## 2025-06-27 RX ADMIN — LIDOCAINE HYDROCHLORIDE 100 MG: 20 INJECTION, SOLUTION EPIDURAL; INFILTRATION; INTRACAUDAL; PERINEURAL at 07:34

## 2025-06-27 RX ADMIN — FENTANYL CITRATE 50 MCG: 50 INJECTION, SOLUTION INTRAMUSCULAR; INTRAVENOUS at 07:34

## 2025-06-27 NOTE — H&P
[address] [facility] [facility] [address] [facility]  [address] [place] [id number]  Dept: [phone]  Dept Fax: [phone]     2022      Roxanne De León   1996     Chief Complaint   Patient presents with    Check-Up     Skin       HPI  Pt comes in today for eval skin spots. First noticed this week when shaving pubic region. Skin appears different colored. Seems new within last few months. No symptoms at all with this. PHQ Scores 2022   PHQ2 Score 0   PHQ9 Score 0     Interpretation of Total Score Depression Severity: 1-4 = Minimal depression, 5-9 = Mild depression, 10-14 = Moderate depression, 15-19 = Moderately severe depression, 20-27 = Severe depression     Prior to Visit Medications    Medication Sig Taking? Authorizing Provider   medical marijuana Take by mouth as needed.  Yes Historical Provider, MD       Past Medical History:   Diagnosis Date    History of lumbar discectomy - 2022        Social History     Tobacco Use    Smoking status: Former     Packs/day: 0.25     Years: 10.00     Pack years: 2.50     Types: Cigarettes     Quit date: 2021     Years since quittin.3    Smokeless tobacco: Current   Vaping Use    Vaping Use: Every day    Substances: Nicotine   Substance Use Topics    Alcohol use: Yes     Comment: social    Drug use: Not Currently        Past Surgical History:   Procedure Laterality Date    EYE MUSCLE SURGERY Left     TONSILLECTOMY      UMBILICAL HERNIA REPAIR          No Known Allergies     Family History   Problem Relation Age of Onset    High Blood Pressure Mother     No Known Problems Father     No Known Problems Sister     Other Brother         Benign brain tumor in childhood    High Blood Pressure Maternal Grandmother     Heart Attack Maternal Grandfather     No Known Problems Paternal Grandmother     No Known Problems Paternal Grandfather     No Known Problems Brother         Patient's past medical Name: Jitendra Silva      : 1980      MRN: [id number]  Encounter Provider: Demarcus Ramirez MD  Encounter Date: 2025   Encounter department: [facility]  :  Assessment & Plan  Right inguinal pain    Orders:    CBC and Platelet; Future    Comprehensive metabolic panel; Future    Left inguinal hernia    Orders:    CBC and Platelet; Future    Comprehensive metabolic panel; Future    Preoperative examination    Orders:    CBC and Platelet; Future    Comprehensive metabolic panel; Future    Diagnosis and plan of laparoscopic TEP explained at length and all questions answered.  Patient desires to proceed.  Will order CMP and CBC to complete workup    History of Present Illness  HPI  Jitendra Silva is a 44 y.o. male who presents with a diagnosis of small bilateral inguinal hernia seen on CT scan.  He had a surgical consultation with my partner, Dr. Segovia, in 2025 where these findings were discussed.  He was referred to me for further consideration regarding laparoscopic TEP bilateral inguinal hernia repair.    He is in overall excellent health with his only chronic medical condition being hypertension which is under control.          Review of Systems   Constitutional:  Negative for chills and fever.   Respiratory: Negative.     Cardiovascular: Negative.    Gastrointestinal: Negative.    Genitourinary: Negative.    Neurological: Negative.    Hematological: Negative.    All other systems reviewed and are negative.    Medical History Reviewed by provider this encounter:     .  Past Medical History  Past Medical History:   Diagnosis Date    Chest pain     Headache(784.0)     High blood pressure     High cholesterol     Hypertension     Increased storage iron     Kidney stone     Lyme disease     Migraine      Past Surgical History:   Procedure Laterality Date    COLONOSCOPY      FL RETROGRADE PYELOGRAM  2024    [place] CYSTO/URETERO W/LITHOTRIPSY &INDWELL STENT INSRT Left  02/18/2024    Procedure: CYSTOSCOPY URETEROSCOPY WITH basket stone extraction,  RETROGRADE PYELOGRAM AND INSERTION STENT URETERAL;  Surgeon: Trevon Pelayo MD;  Location: [facility];  Service: Urology    MI NSL/SINUS NDSC MAX ANTROST W/RMVL TISS MAX SINUS Left 12/12/2024    Procedure: LEFT MAXILLARY ENDOSCOPIC ANTROSTOMY WITH REMOVAL OF TISSUE;  Surgeon: Tha Brown MD;  Location: [facility];  Service: ENT    MI TONSILLECTOMY & ADENOIDECTOMY AGE 11/> N/A 09/21/2023    Procedure: TONSILLECTOMY;  Surgeon: Tha Brown MD;  Location: [facility];  Service: ENT    TONSILLECTOMY       Family History   Problem Relation Age of Onset    Cancer Mother         Breast cancer    Arthritis Mother     Cancer Father     Early death Father         Lymphoma      reports that he has never smoked. He has never been exposed to tobacco smoke. He has never used smokeless tobacco. He reports current alcohol use. He reports that he does not use drugs.  Current Outpatient Medications   Medication Instructions    amLODIPine (NORVASC) 10 mg, Oral, Daily    Bromfenac Sodium 0.075 % SOLN     doxycycline hyclate (VIBRAMYCIN) 100 mg, Oral, Every 12 hours scheduled    ergocalciferol (VITAMIN D2) 50,000 units take 1 capsule by mouth every week with food    famotidine (PEPCID) 20 mg, Oral, 2 times daily    lisinopril (ZESTRIL) 20 mg, Oral, Daily    methylPREDNISolone 4 MG tablet therapy pack Use as directed on package     Allergies   Allergen Reactions    Penicillins Hives     Reaction Date: 11Feb2005;       Shellfish-Derived Products - Food Allergy Itching      Current Outpatient Medications on File Prior to Visit   Medication Sig Dispense Refill    amLODIPine (NORVASC) 10 mg tablet Take 1 tablet (10 mg total) by mouth daily 90 tablet 3    Bromfenac Sodium 0.075 % SOLN       doxycycline hyclate (VIBRAMYCIN) 100 mg capsule Take 1 capsule (100 mg total) by mouth every 12 (twelve) hours for 10 days 20 capsule 0    ergocalciferol  (VITAMIN D2) 50,000 units take 1 capsule by mouth every week with food      famotidine (PEPCID) 20 mg tablet Take 1 tablet (20 mg total) by mouth 2 (two) times a day 60 tablet 0    lisinopril (ZESTRIL) 20 mg tablet Take 1 tablet (20 mg total) by mouth daily 90 tablet 3    methylPREDNISolone 4 MG tablet therapy pack Use as directed on package (Patient not taking: Reported on 5/1/2025) 21 each 0     No current facility-administered medications on file prior to visit.      Social History     Tobacco Use    Smoking status: Never     Passive exposure: Never    Smokeless tobacco: Never    Tobacco comments:     Occasionally   Vaping Use    Vaping status: Never Used   Substance and Sexual Activity    Alcohol use: Yes     Comment: Occasionally    Drug use: Never    Sexual activity: Not Currently     Birth control/protection: None        Objective  There were no vitals taken for this visit.     Physical Exam  Vitals reviewed.   Constitutional:       General: He is not in acute distress.     Appearance: Normal appearance.   Cardiovascular:      Rate and Rhythm: Normal rate and regular rhythm.   Pulmonary:      Effort: Pulmonary effort is normal. No respiratory distress.      Breath sounds: Normal breath sounds.   Abdominal:      General: Abdomen is flat. There is no distension.      Palpations: Abdomen is soft.      Comments: Small, left greater than right, inguinal hernias.  Could also represent cord lipomas.   Musculoskeletal:         General: Normal range of motion.   Skin:     General: Skin is warm and dry.   Neurological:      General: No focal deficit present.      Mental Status: He is alert.   Psychiatric:         Mood and Affect: Mood normal.              Diagnosis   Name Primary?  Dermatophytosis of groin and pubic area Yes       Plan:  1. Dermatophytosis of groin and pubic area  Suspect acute tinea rash to suprapubic region. Start topical antifungal now and hopeful for resolution within 2 to 4 weeks. Given patient precautions and he has to MyChart message update in 1 month  - clotrimazole (LOTRIMIN AF) 1 % cream; Apply topically 2 times daily. Dispense: 60 g; Refill: 0    Return if symptoms worsen or fail to improve. Ashanti Pineda, DO     Please note that this chart was generated using dragon dictation software. Although every effort was made to ensure the accuracy of this automated transcription, some errors in transcription may have occurred.

## 2025-06-27 NOTE — INTERVAL H&P NOTE
H&P reviewed. After examining the patient I find no changes in the patients condition since the H&P had been written.    Vitals:    06/27/25 0626   BP: 156/84   Pulse: 84   Resp: 16   Temp: 98.4 °F (36.9 °C)   SpO2: 98%

## 2025-06-27 NOTE — PERIOPERATIVE NURSING NOTE
Patient ambulated with minimal assistance to the bathroom where he successfully voided clear yellow urine. Patient ready for dc home.

## 2025-06-27 NOTE — OP NOTE
OPERATIVE REPORT  PATIENT NAME: Jitendra Silva    :  1980  MRN: [id number]  Pt Location: [facility] ROOM 01    SURGERY DATE: 2025    Surgeons and Role:     * Demarcus Ramirez MD - Primary     * Joesph Rand PA-C - Assisting    Preop Diagnosis:  Right inguinal pain [R10.31]  Left inguinal hernia [K40.90]    Post-Op Diagnosis Codes:     * Right inguinal pain [R10.31]     * Left inguinal hernia [K40.90]    Procedure(s):  Bilateral - REPAIR HERNIA INGUINAL. LAPAROSCOPIC    Specimen(s):  * No specimens in log *    Estimated Blood Loss:   Minimal    Drains:  [REMOVED] Urethral Catheter Latex 16 Fr. (Removed)   Number of days: 0       Anesthesia Type:   General    Operative Indications:  Right inguinal pain [R10.31]  Left inguinal hernia [K40.90]      Operative Findings:  Bilateral inguinal hernias and cord lipomas       Complications:   None    Procedure and Technique:  Laparoscopic total extraperitoneal bilateral inguinal hernia repair    Patient was taken back to main operating, placed supine on the operating table, general anesthesia was induced, a Denton catheter was placed, the abdomen was prepped and draped in normal fashion.    Utilizing a small incision below the umbilicus, skin was incised.  Soft tissue was divided Bovie cautery.  Anterior rectus fascia was opened on the left and the Spacemaker plus balloon system was placed into the retrorectus, preperitoneal, space.  The dissection balloon was inflated and a 5-minute pause was performed.    After a 5-minute pause, the dissection balloon was removed and a pneumoperitoneum was created in the preperitoneal space and maintained at 15 mmHg throughout the case.  2 additional 5 mm trocars were placed in the midline.    The left groin was dissected first.  A large cord lipoma was reduced into the abdominal cavity as well as a small indirect hernia sac.  There was no direct hernia.  Left 3D max medium mesh was chosen and placed per standard fashion.  This was  tacked utilizing the pro tacker to the Jeremiah's ligament inferior and medially, rectus muscle superior and medially, and 1 tack was placed in the tail of the mesh laterally.    In a similar fashion, the right groin was explored.  There was also a large cord lipoma and small indirect hernia sac which were reduced.  Right 3D max medium mesh was chosen and fixed in place in a similar fashion utilizing the pro tacker to the Jeremiah's ligament inferiorly and medially, rectus muscle superior and medially, and 1 coil was placed in the tail of the mesh.    The pneumoperitoneum was evacuated and the peritoneum was visualized as it came up in contact to the mesh.    The ports were removed and the anterior rectus fascia was closed with 0 Vicryl suture.  Skin edges were reapproximated with 4-0 Monocryl.  Exofin was placed as a dressing.  A large field block was created around each incision at the end of the case.    Denton catheter was removed, the patient awoke from general anesthesia, was extubated operating room, sent to the PACU in stable condition.    JAKI Rand was required for technical assistance and retraction   I was present for the entire procedure., A qualified resident physician was not available., and A physician assistant was required during the procedure for retraction, tissue handling, dissection and suturing.    Patient Disposition:  PACU          SIGNATURE: Demarcus Ramirez MD  DATE: June 27, 2025  TIME: 9:06 AM

## 2025-06-27 NOTE — ANESTHESIA POSTPROCEDURE EVALUATION
Post-Op Assessment Note    CV Status:  Stable  Pain Score: 0    Pain management: adequate       Mental Status:  Alert and awake   Hydration Status:  Euvolemic   PONV Controlled:  Controlled   Airway Patency:  Patent     Post Op Vitals Reviewed: Yes    No anethesia notable event occurred.    Staff: Anesthesiologist, with CRNAs           Last Filed PACU Vitals:  Vitals Value Taken Time   Temp 98.1 °F (36.7 °C) 06/27/25 08:55   Pulse 85 06/27/25 09:30   /56 06/27/25 09:30   Resp 18 06/27/25 09:30   SpO2 94 % 06/27/25 09:30       Modified Belen:     Vitals Value Taken Time   Activity 2 06/27/25 08:55   Respiration 2 06/27/25 08:55   Circulation 2 06/27/25 08:55   Consciousness 1 06/27/25 08:55   Oxygen Saturation 1 06/27/25 08:55     Modified Belen Score: 8

## 2025-06-27 NOTE — DISCHARGE INSTR - AVS FIRST PAGE
1.  Keep incisions clean and dry for 2 days.  After 2 days, they may get wet to the shower.  No dressings are required.  2.  Take ibuprofen, 600 mg, 3 times a day regularly for the next 4 days.  3.  Take Percocet, in addition to the ibuprofen, if you need further pain control.  4.  Follow-up as already scheduled on 10 July at 9 AM.  If you have any questions before then, call the office at [phone].

## 2025-06-27 NOTE — ANESTHESIA PREPROCEDURE EVALUATION
Procedure:  REPAIR HERNIA INGUINAL, LAPAROSCOPIC (Bilateral: Groin)    Relevant Problems   CARDIO   (+) Primary hypertension      GI/HEPATIC   (+) Gastroesophageal reflux disease without esophagitis      NEURO/PSYCH   (+) Chronic neck pain        Physical Exam    Airway     Mallampati score: II  TM Distance: >3 FB  Neck ROM: full      Cardiovascular      Dental   No notable dental hx     Pulmonary      Neurological      Other Findings      Anesthesia Plan  ASA Score- 2     Anesthesia Type- general with ASA Monitors.         Additional Monitors:     Airway Plan: Oral ETT.           Plan Factors-Exercise tolerance (METS): >4 METS.    Chart reviewed.   Existing labs reviewed. Patient summary reviewed.    Patient is not a current smoker.      There is medical exclusion for perioperative obstructive sleep apnea risk education.        Induction- intravenous.    Postoperative Plan- Plan for postoperative opioid use.   Monitoring Plan -   Post Operative Pain Plan - plan for postoperative opioid use        Informed Consent- Anesthetic plan and risks discussed with patient.  I personally reviewed this patient with the CRNA. Discussed and agreed on the Anesthesia Plan with the CRNA..      NPO Status:  Vitals Value Taken Time   Date of last liquid 06/27/25 06/27/25 06:16   Time of last liquid 0500 06/27/25 06:16   Date of last solid 06/26/25 06/27/25 06:16   Time of last solid 2100 06/27/25 06:16

## 2025-06-27 NOTE — H&P
Name: Jitendra Silva      : 1980      MRN: [id number]  Encounter Provider: Demarcus Ramirez MD  Encounter Date: 2025   Encounter department: [facility]  :  Assessment & Plan  Right inguinal pain    Orders:    CBC and Platelet; Future    Comprehensive metabolic panel; Future    Left inguinal hernia    Orders:    CBC and Platelet; Future    Comprehensive metabolic panel; Future    Preoperative examination    Orders:    CBC and Platelet; Future    Comprehensive metabolic panel; Future    Diagnosis and plan of laparoscopic TEP explained at length and all questions answered.  Patient desires to proceed.  Will order CMP and CBC to complete workup    History of Present Illness  HPI  Jitendra Silva is a 44 y.o. male who presents with a diagnosis of small bilateral inguinal hernia seen on CT scan.  He had a surgical consultation with my partner, Dr. Segovia, in 2025 where these findings were discussed.  He was referred to me for further consideration regarding laparoscopic TEP bilateral inguinal hernia repair.    He is in overall excellent health with his only chronic medical condition being hypertension which is under control.          Review of Systems   Constitutional:  Negative for chills and fever.   Respiratory: Negative.     Cardiovascular: Negative.    Gastrointestinal: Negative.    Genitourinary: Negative.    Neurological: Negative.    Hematological: Negative.    All other systems reviewed and are negative.    Medical History Reviewed by provider this encounter:     .  Past Medical History  Past Medical History:   Diagnosis Date    Chest pain     Headache(784.0)     High blood pressure     High cholesterol     Hypertension     Increased storage iron     Kidney stone     Lyme disease     Migraine      Past Surgical History:   Procedure Laterality Date    COLONOSCOPY      FL RETROGRADE PYELOGRAM  2024    [place] CYSTO/URETERO W/LITHOTRIPSY &INDWELL STENT INSRT Left  02/18/2024    Procedure: CYSTOSCOPY URETEROSCOPY WITH basket stone extraction,  RETROGRADE PYELOGRAM AND INSERTION STENT URETERAL;  Surgeon: Trevon Pelayo MD;  Location: [facility];  Service: Urology    [place] NSL/SINUS NDSC MAX ANTROST W/RMVL TISS MAX SINUS Left 12/12/2024    Procedure: LEFT MAXILLARY ENDOSCOPIC ANTROSTOMY WITH REMOVAL OF TISSUE;  Surgeon: Tha Brown MD;  Location: [facility];  Service: ENT    GA TONSILLECTOMY & ADENOIDECTOMY AGE 11/> N/A 09/21/2023    Procedure: TONSILLECTOMY;  Surgeon: Tha Brown MD;  Location: [facility];  Service: ENT    TONSILLECTOMY       Family History   Problem Relation Age of Onset    Cancer Mother         Breast cancer    Arthritis Mother     Cancer Father     Early death Father         Lymphoma      reports that he has never smoked. He has never been exposed to tobacco smoke. He has never used smokeless tobacco. He reports current alcohol use. He reports that he does not use drugs.  Current Outpatient Medications   Medication Instructions    amLODIPine (NORVASC) 10 mg, Oral, Daily    Bromfenac Sodium 0.075 % SOLN     doxycycline hyclate (VIBRAMYCIN) 100 mg, Oral, Every 12 hours scheduled    ergocalciferol (VITAMIN D2) 50,000 units take 1 capsule by mouth every week with food    famotidine (PEPCID) 20 mg, Oral, 2 times daily    lisinopril (ZESTRIL) 20 mg, Oral, Daily    methylPREDNISolone 4 MG tablet therapy pack Use as directed on package     Allergies   Allergen Reactions    Penicillins Hives     Reaction Date: 11Feb2005;       Shellfish-Derived Products - Food Allergy Itching      Current Outpatient Medications on File Prior to Visit   Medication Sig Dispense Refill    amLODIPine (NORVASC) 10 mg tablet Take 1 tablet (10 mg total) by mouth daily 90 tablet 3    Bromfenac Sodium 0.075 % SOLN       doxycycline hyclate (VIBRAMYCIN) 100 mg capsule Take 1 capsule (100 mg total) by mouth every 12 (twelve) hours for 10 days 20 capsule 0    ergocalciferol  (VITAMIN D2) 50,000 units take 1 capsule by mouth every week with food      famotidine (PEPCID) 20 mg tablet Take 1 tablet (20 mg total) by mouth 2 (two) times a day 60 tablet 0    lisinopril (ZESTRIL) 20 mg tablet Take 1 tablet (20 mg total) by mouth daily 90 tablet 3    methylPREDNISolone 4 MG tablet therapy pack Use as directed on package (Patient not taking: Reported on 5/1/2025) 21 each 0     No current facility-administered medications on file prior to visit.      Social History     Tobacco Use    Smoking status: Never     Passive exposure: Never    Smokeless tobacco: Never    Tobacco comments:     Occasionally   Vaping Use    Vaping status: Never Used   Substance and Sexual Activity    Alcohol use: Yes     Comment: Occasionally    Drug use: Never    Sexual activity: Not Currently     Birth control/protection: None        Objective  There were no vitals taken for this visit.     Physical Exam  Vitals reviewed.   Constitutional:       General: He is not in acute distress.     Appearance: Normal appearance.   Cardiovascular:      Rate and Rhythm: Normal rate and regular rhythm.   Pulmonary:      Effort: Pulmonary effort is normal. No respiratory distress.      Breath sounds: Normal breath sounds.   Abdominal:      General: Abdomen is flat. There is no distension.      Palpations: Abdomen is soft.      Comments: Small, left greater than right, inguinal hernias.  Could also represent cord lipomas.   Musculoskeletal:         General: Normal range of motion.   Skin:     General: Skin is warm and dry.   Neurological:      General: No focal deficit present.      Mental Status: He is alert.   Psychiatric:         Mood and Affect: Mood normal.

## 2025-06-27 NOTE — PERIOPERATIVE NURSING NOTE
Received patient from pacu rn uma, patient resting in bed, bed locked in lowest position with side rails raised. patient aoo vitals stable. Patient verbalizes pain to b/l inguinal surgical site at 0/10, denies further pain or discomfort and states this pain is manageable and would not like further intervention at this time. Patient given apple juice and is tolerating sips well without complaint. Patient incision to midline abdomen Is CDI, exofin intact, ice applied. Patient resting in bed, call light in reach and environment cleared. Plan of care ongoing.

## 2025-06-27 NOTE — ANESTHESIA POSTPROCEDURE EVALUATION
Post-Op Assessment Note    CV Status:  Stable  Pain Score: 0    Pain management: adequate       Mental Status:  Sleepy and arousable   Hydration Status:  Euvolemic   PONV Controlled:  Controlled   Airway Patency:  Patent and adequate     Post Op Vitals Reviewed: Yes    No anethesia notable event occurred.    Staff: CRNA         Last Filed PACU Vitals:  Vitals Value Taken Time   Temp 98.1 °F (36.7 °C) 06/27/25 08:55   Pulse 96 06/27/25 08:55   /64 06/27/25 08:55   Resp 18 06/27/25 08:55   SpO2         Modified Belen:     Vitals Value Taken Time   Activity 2 06/27/25 08:55   Respiration 2 06/27/25 08:55   Circulation 2 06/27/25 08:55   Consciousness 1 06/27/25 08:55   Oxygen Saturation 1 06/27/25 08:55     Modified Belen Score: 8

## 2025-07-01 DIAGNOSIS — I10 PRIMARY HYPERTENSION: ICD-10-CM

## 2025-07-01 DIAGNOSIS — E78.5 DYSLIPIDEMIA: ICD-10-CM

## 2025-07-01 RX ORDER — ROSUVASTATIN CALCIUM 10 MG/1
10 TABLET, COATED ORAL DAILY
Qty: 90 TABLET | Refills: 3 | Status: SHIPPED | OUTPATIENT
Start: 2025-07-01

## 2025-07-01 RX ORDER — AMLODIPINE BESYLATE 10 MG/1
10 TABLET ORAL DAILY
Qty: 90 TABLET | Refills: 3 | Status: SHIPPED | OUTPATIENT
Start: 2025-07-01

## 2025-07-01 RX ORDER — LISINOPRIL 20 MG/1
20 TABLET ORAL DAILY
Qty: 90 TABLET | Refills: 3 | Status: SHIPPED | OUTPATIENT
Start: 2025-07-01

## 2025-07-07 NOTE — PROGRESS NOTES
Patient is status post laparoscopic total extraperitoneal bilateral inguinal hernia repair 30 May 2025.  He is here for routine follow-up.  He reports minimal pain and no wound issues.    Incisions healing nicely.  No signs of infection.  Patient counseled.  Follow-up as needed.

## 2025-07-09 ENCOUNTER — OFFICE VISIT (OUTPATIENT)
Dept: FAMILY MEDICINE CLINIC | Facility: CLINIC | Age: 45
End: 2025-07-09
Payer: MEDICARE

## 2025-07-09 ENCOUNTER — TELEPHONE (OUTPATIENT)
Age: 45
End: 2025-07-09

## 2025-07-09 VITALS
RESPIRATION RATE: 14 BRPM | TEMPERATURE: 97.4 F | BODY MASS INDEX: 33.19 KG/M2 | HEART RATE: 88 BPM | HEIGHT: 68 IN | WEIGHT: 219 LBS | OXYGEN SATURATION: 97 % | DIASTOLIC BLOOD PRESSURE: 78 MMHG | SYSTOLIC BLOOD PRESSURE: 120 MMHG

## 2025-07-09 DIAGNOSIS — H66.92 LEFT OTITIS MEDIA, UNSPECIFIED OTITIS MEDIA TYPE: Primary | ICD-10-CM

## 2025-07-09 PROCEDURE — G2211 COMPLEX E/M VISIT ADD ON: HCPCS | Performed by: FAMILY MEDICINE

## 2025-07-09 PROCEDURE — 99213 OFFICE O/P EST LOW 20 MIN: CPT | Performed by: FAMILY MEDICINE

## 2025-07-09 RX ORDER — CEFDINIR 300 MG/1
300 CAPSULE ORAL EVERY 12 HOURS SCHEDULED
Qty: 20 CAPSULE | Refills: 0 | Status: SHIPPED | OUTPATIENT
Start: 2025-07-09 | End: 2025-07-19

## 2025-07-09 RX ORDER — OFLOXACIN 3 MG/ML
5 SOLUTION AURICULAR (OTIC) 2 TIMES DAILY
Qty: 2.5 ML | Refills: 0 | Status: SHIPPED | OUTPATIENT
Start: 2025-07-09 | End: 2025-07-14

## 2025-07-09 NOTE — TELEPHONE ENCOUNTER
Patient's mother called asking for appointment with Primary Care Provider only for symptoms starting a couple days ago, left ear pain, denies all other symptoms. Did not wish to schedule with another provider.

## 2025-07-09 NOTE — PROGRESS NOTES
"Name: Jitendra Silva      : 1980      MRN: [id number]  Encounter Provider: Deirdre Hill MD  Encounter Date: 2025   Encounter department: [facility]    Assessment & Plan  Left otitis media, unspecified otitis media type  Patient does have a left otitis media.  I do see a small perforation in the TM therefore recommend coverage with drops.  Orders:  •  cefdinir (OMNICEF) 300 mg capsule; Take 1 capsule (300 mg total) by mouth every 12 (twelve) hours for 10 days  •  ofloxacin (FLOXIN) 0.3 % otic solution; Administer 5 drops into the left ear 2 (two) times a day for 5 days         History of Present Illness     HPI  44-year-old male presenting to the office states that he has had a sudden onset of left ear pain.  States he used over-the-counter medications and feels like it made it worse.  Patient just recently recovering from a hernia surgery.  Review of Systems   Constitutional:  Negative for activity change, appetite change, chills, fatigue and fever.   HENT:  Positive for ear pain and hearing loss. Negative for congestion.    Respiratory:  Negative for cough, chest tightness and shortness of breath.    Cardiovascular:  Negative for chest pain and leg swelling.   Gastrointestinal:  Negative for abdominal distention, abdominal pain, constipation, diarrhea, nausea and vomiting.   All other systems reviewed and are negative.    Past Medical History[1]  Past Surgical History[2]  Family History[3]  Social History[4]  Medications[5]  Allergies   Allergen Reactions   • Penicillins Hives     Reaction Date: 2005;      • Shellfish-Derived Products - Food Allergy Itching     Immunization History   Administered Date(s) Administered   • INFLUENZA 2024     Objective   /78 (BP Location: Right arm, Patient Position: Sitting, Cuff Size: Large)   Pulse 88   Temp (!) 97.4 °F (36.3 °C) (Temporal)   Resp 14   Ht 5' 8\" (1.727 m)   Wt 99.3 kg (219 lb)   SpO2 97%  "  BMI 33.30 kg/m²     Physical Exam  Constitutional:       Appearance: He is well-developed.   HENT:      Head: Normocephalic and atraumatic.      Right Ear: Tympanic membrane, ear canal and external ear normal.      Left Ear: A middle ear effusion is present. Tympanic membrane is perforated (pin hole) and erythematous.     Eyes:      Conjunctiva/sclera: Conjunctivae normal.      Pupils: Pupils are equal, round, and reactive to light.     Pulmonary:      Effort: Pulmonary effort is normal.     Neurological:      Mental Status: He is alert and oriented to person, place, and time.     Psychiatric:         Behavior: Behavior normal.         Thought Content: Thought content normal.         Judgment: Judgment normal.                [1]  Past Medical History:  Diagnosis Date   • Chest pain    • Headache(784.0)    • High blood pressure    • High cholesterol    • Hypertension    • Increased storage iron    • Kidney stone    • Lyme disease    • Migraine    [2]  Past Surgical History:  Procedure Laterality Date   • COLONOSCOPY     • [place] RETROGRADE PYELOGRAM  02/18/2024   • HERNIA REPAIR  6/29/2025   • [place] CYSTO/URETERO W/LITHOTRIPSY &INDWELL STENT INSRT Left 02/18/2024    Procedure: CYSTOSCOPY URETEROSCOPY WITH basket stone extraction,  RETROGRADE PYELOGRAM AND INSERTION STENT URETERAL;  Surgeon: Trevon Pelayo MD;  Location: [facility];  Service: Urology   • [place] LAPS SURG RPR RECURRENT INGUINAL HERNIA Bilateral 06/27/2025    Procedure: REPAIR HERNIA INGUINAL, LAPAROSCOPIC;  Surgeon: Demarcus Ramirez MD;  Location: [facility];  Service: General   • [place] NSL/SINUS NDSC MAX ANTROST W/RMVL TISS MAX SINUS Left 12/12/2024    Procedure: LEFT MAXILLARY ENDOSCOPIC ANTROSTOMY WITH REMOVAL OF TISSUE;  Surgeon: Tha Brown MD;  Location: [facility];  Service: ENT   • [place] TONSILLECTOMY & ADENOIDECTOMY AGE 11/> N/A 09/21/2023    Procedure: TONSILLECTOMY;  Surgeon: Tha Brown MD;  Location: [facility];  Service: ENT   •  TONSILLECTOMY     [3]  Family History  Problem Relation Name Age of Onset   • Cancer Mother Rica Silva         Breast cancer   • Arthritis Mother Rica Silva    • Cancer Father Eddie    • Early death Father Eddie         Lymphoma   [4]  Social History  Tobacco Use   • Smoking status: Never     Passive exposure: Never   • Smokeless tobacco: Never   • Tobacco comments:     Occasionally   Vaping Use   • Vaping status: Never Used   Substance and Sexual Activity   • Alcohol use: Yes     Comment: Occasionally   • Drug use: Never   • Sexual activity: Not Currently     Birth control/protection: None   [5]  Current Outpatient Medications on File Prior to Visit   Medication Sig   • amLODIPine (NORVASC) 10 mg tablet Take 1 tablet (10 mg total) by mouth daily   • Bromfenac Sodium 0.075 % SOLN    • ergocalciferol (VITAMIN D2) 50,000 units    • famotidine (PEPCID) 20 mg tablet take 1 tablet by mouth twice a day   • lisinopril (ZESTRIL) 20 mg tablet Take 1 tablet (20 mg total) by mouth daily   • oxyCODONE-acetaminophen (Percocet) 5-325 mg per tablet Take 1 tablet by mouth every 8 (eight) hours as needed for moderate pain or severe pain for up to 10 doses Max Daily Amount: 3 tablets   • rosuvastatin (CRESTOR) 10 MG tablet Take 1 tablet (10 mg total) by mouth daily

## 2025-07-10 ENCOUNTER — OFFICE VISIT (OUTPATIENT)
Age: 45
End: 2025-07-10

## 2025-07-10 VITALS — HEIGHT: 68 IN | BODY MASS INDEX: 33.19 KG/M2 | WEIGHT: 219 LBS | TEMPERATURE: 97 F

## 2025-07-10 DIAGNOSIS — Z09 SURGERY FOLLOW-UP EXAMINATION: Primary | ICD-10-CM

## 2025-07-10 PROCEDURE — 99024 POSTOP FOLLOW-UP VISIT: CPT | Performed by: SURGERY

## 2025-07-16 ENCOUNTER — OFFICE VISIT (OUTPATIENT)
Dept: FAMILY MEDICINE CLINIC | Facility: CLINIC | Age: 45
End: 2025-07-16
Payer: MEDICARE

## 2025-07-16 VITALS
SYSTOLIC BLOOD PRESSURE: 140 MMHG | BODY MASS INDEX: 33.07 KG/M2 | HEART RATE: 70 BPM | DIASTOLIC BLOOD PRESSURE: 81 MMHG | OXYGEN SATURATION: 96 % | WEIGHT: 217.5 LBS

## 2025-07-16 DIAGNOSIS — H67.2 OTITIS MEDIA OF LEFT EAR IN DISEASE CLASSIFIED ELSEWHERE: Primary | ICD-10-CM

## 2025-07-16 DIAGNOSIS — I10 PRIMARY HYPERTENSION: ICD-10-CM

## 2025-07-16 PROCEDURE — G2211 COMPLEX E/M VISIT ADD ON: HCPCS | Performed by: FAMILY MEDICINE

## 2025-07-16 PROCEDURE — 99214 OFFICE O/P EST MOD 30 MIN: CPT | Performed by: FAMILY MEDICINE

## 2025-07-16 RX ORDER — METHYLPREDNISOLONE 4 MG/1
TABLET ORAL
Qty: 21 EACH | Refills: 0 | Status: SHIPPED | OUTPATIENT
Start: 2025-07-16

## 2025-07-16 NOTE — PROGRESS NOTES
Name: Jitendra Silva      : 1980      MRN: [id number]  Encounter Provider: Deirdre Hill MD  Encounter Date: 2025   Encounter department: [facility]    Assessment & Plan  Otitis media of left ear in disease classified elsewhere  Continue course of antibiotics.  I do not think the patient needs an eardrop anymore.  But I do believe he needs a Medrol pack given fluid in bilateral ears  Orders:  •  methylPREDNISolone 4 MG tablet therapy pack; Use as directed on package    Primary hypertension  Continue on BP meds as prescribed            History of Present Illness     Earache   Pertinent negatives include no abdominal pain, coughing, diarrhea or vomiting.   jitendra Silva, a 44-year-old patient, presented with left ear pain and hearing loss in 2025, diagnosed as left otitis media with a small TM perforation, treated with cefdinir and ofloxacin.  Patient states that unfortunately he has been having persistent ear clogging and wanted to make sure that the infection did not worsen.  Forgot to take his BP meds today  Review of Systems   Constitutional:  Negative for activity change, appetite change, chills, fatigue and fever.   HENT:  Positive for ear pain. Negative for congestion.    Respiratory:  Negative for cough, chest tightness and shortness of breath.    Cardiovascular:  Negative for chest pain and leg swelling.   Gastrointestinal:  Negative for abdominal distention, abdominal pain, constipation, diarrhea, nausea and vomiting.   All other systems reviewed and are negative.    Past Medical History[1]  Past Surgical History[2]  Family History[3]  Social History[4]  Medications[5]  Allergies   Allergen Reactions   • Penicillins Hives     Reaction Date: 2005;      • Shellfish-Derived Products - Food Allergy Itching     Immunization History   Administered Date(s) Administered   • INFLUENZA 2024     Objective   /81   Pulse 70   Wt 98.7 kg  (217 lb 8 oz)   SpO2 96%   BMI 33.07 kg/m²     Physical Exam  Vitals reviewed.   Constitutional:       Appearance: He is well-developed.   HENT:      Head: Normocephalic and atraumatic.      Right Ear: Tympanic membrane, ear canal and external ear normal.      Left Ear: Tympanic membrane, ear canal and external ear normal.      Nose: Nose normal.      Mouth/Throat:      Mouth: Mucous membranes are moist.     Eyes:      Conjunctiva/sclera: Conjunctivae normal.      Pupils: Pupils are equal, round, and reactive to light.       Cardiovascular:      Rate and Rhythm: Normal rate and regular rhythm.      Heart sounds: Normal heart sounds.   Pulmonary:      Effort: Pulmonary effort is normal.      Breath sounds: Normal breath sounds. No wheezing.   Abdominal:      General: Bowel sounds are normal. There is no distension.      Palpations: Abdomen is soft. There is no mass.      Tenderness: There is no abdominal tenderness.     Musculoskeletal:         General: No tenderness. Normal range of motion.      Cervical back: Normal range of motion and neck supple.     Skin:     General: Skin is warm.      Capillary Refill: Capillary refill takes less than 2 seconds.     Neurological:      Mental Status: He is alert and oriented to person, place, and time.      Cranial Nerves: No cranial nerve deficit.      Sensory: No sensory deficit.      Motor: No abnormal muscle tone.      Coordination: Coordination normal.      Deep Tendon Reflexes: Reflexes normal.     Psychiatric:         Behavior: Behavior normal.         Thought Content: Thought content normal.         Judgment: Judgment normal.                [1]  Past Medical History:  Diagnosis Date   • Chest pain    • Headache(784.0)    • High blood pressure    • High cholesterol    • Hypertension    • Increased storage iron    • Kidney stone    • Lyme disease    • Migraine    [2]  Past Surgical History:  Procedure Laterality Date   • COLONOSCOPY     • FL RETROGRADE PYELOGRAM   02/18/2024   • HERNIA REPAIR  6/29/2025   • OK CYSTO/URETERO W/LITHOTRIPSY &INDWELL STENT INSRT Left 02/18/2024    Procedure: CYSTOSCOPY URETEROSCOPY WITH basket stone extraction,  RETROGRADE PYELOGRAM AND INSERTION STENT URETERAL;  Surgeon: Trevon Pelayo MD;  Location: [facility];  Service: Urology   • OK LAPS SURG RPR RECURRENT INGUINAL HERNIA Bilateral 06/27/2025    Procedure: REPAIR HERNIA INGUINAL, LAPAROSCOPIC;  Surgeon: Demarcus Ramirez MD;  Location: [facility];  Service: General   • OK NSL/SINUS NDSC MAX ANTROST W/RMVL TISS MAX SINUS Left 12/12/2024    Procedure: LEFT MAXILLARY ENDOSCOPIC ANTROSTOMY WITH REMOVAL OF TISSUE;  Surgeon: Tha Brown MD;  Location: [facility];  Service: ENT   • OK TONSILLECTOMY & ADENOIDECTOMY AGE 11/> N/A 09/21/2023    Procedure: TONSILLECTOMY;  Surgeon: Tha Brown MD;  Location: [facility];  Service: ENT   • TONSILLECTOMY     [3]  Family History  Problem Relation Name Age of Onset   • Cancer Mother Rica Silva         Breast cancer   • Arthritis Mother Rica Silva    • Cancer Father Eddie    • Early death Father Eddie         Lymphoma   [4]  Social History  Tobacco Use   • Smoking status: Never     Passive exposure: Never   • Smokeless tobacco: Never   • Tobacco comments:     Occasionally   Vaping Use   • Vaping status: Never Used   Substance and Sexual Activity   • Alcohol use: Yes     Comment: Occasionally   • Drug use: Never   • Sexual activity: Not Currently     Birth control/protection: None   [5]  Current Outpatient Medications on File Prior to Visit   Medication Sig   • amLODIPine (NORVASC) 10 mg tablet Take 1 tablet (10 mg total) by mouth daily   • Bromfenac Sodium 0.075 % SOLN    • cefdinir (OMNICEF) 300 mg capsule Take 1 capsule (300 mg total) by mouth every 12 (twelve) hours for 10 days   • ergocalciferol (VITAMIN D2) 50,000 units    • famotidine (PEPCID) 20 mg tablet take 1 tablet by mouth twice a day   • lisinopril (ZESTRIL)  20 mg tablet Take 1 tablet (20 mg total) by mouth daily   • oxyCODONE-acetaminophen (Percocet) 5-325 mg per tablet Take 1 tablet by mouth every 8 (eight) hours as needed for moderate pain or severe pain for up to 10 doses Max Daily Amount: 3 tablets   • rosuvastatin (CRESTOR) 10 MG tablet Take 1 tablet (10 mg total) by mouth daily

## 2025-07-28 DIAGNOSIS — J02.9 SORE THROAT: ICD-10-CM

## 2025-07-29 RX ORDER — CEFDINIR 300 MG/1
300 CAPSULE ORAL 2 TIMES DAILY
Qty: 20 CAPSULE | Refills: 0 | OUTPATIENT
Start: 2025-07-29 | End: 2025-08-08

## (undated) DEVICE — ACCESS AND DISSECTOR SYSTEM: Brand: SPACEMAKER

## (undated) DEVICE — LUBRICANT JELLY SURGILUBE TUBE 4OZ FLIP TOP

## (undated) DEVICE — SPLINT 1524050 5PK PAIR DOYLE II AIRWAY: Brand: DOYLE II ™

## (undated) DEVICE — CHLORAPREP HI-LITE 26ML ORANGE

## (undated) DEVICE — SUCTION COAGULATOR 12FR HAND CONTROL MEGADYNE

## (undated) DEVICE — ASTOUND STANDARD SURGICAL GOWN, XL: Brand: CONVERTORS

## (undated) DEVICE — STERILE DOUBLE BASIN SET PACK: Brand: CARDINAL HEALTH

## (undated) DEVICE — TRAY FOLEY 16FR URIMETER SURESTEP

## (undated) DEVICE — SPECIMEN CONTAINER STERILE PEEL PACK

## (undated) DEVICE — SUT CHROMIC 4-0 RB-1 27 IN U203H

## (undated) DEVICE — DRAPE UTILITY

## (undated) DEVICE — DENTAL PACK: Brand: CARDINAL HEALTH

## (undated) DEVICE — NEPTUNE E-SEP SMOKE EVACUATION PENCIL, COATED, 70MM BLADE, PUSH BUTTON SWITCH: Brand: NEPTUNE E-SEP

## (undated) DEVICE — URETERAL CATH 5 FR

## (undated) DEVICE — TUBING SUCTION 5MM X 12 FT

## (undated) DEVICE — SUT VICRYL 0 UR-6 27 IN J603H

## (undated) DEVICE — FABRIC REINFORCED, SURGICAL GOWN, XL: Brand: CONVERTORS

## (undated) DEVICE — GLOVE INDICATOR PI UNDERGLOVE SZ 7.5 BLUE

## (undated) DEVICE — Device

## (undated) DEVICE — GUIDEWIRE STRGHT TIP 0.038 IN SOLO PLUS

## (undated) DEVICE — ELECTRODE BLADE MOD E-Z CLEAN  2.75IN 7CM -0012AM

## (undated) DEVICE — SUCTION BOVIE ENT

## (undated) DEVICE — MAGNETIC INSTRUMENT PAD SMALL

## (undated) DEVICE — 2, DISPOSABLE SUCTION/IRRIGATOR WITHOUT DISPOSABLE TIP: Brand: STRYKEFLOW

## (undated) DEVICE — ADHESIVE SKIN HIGH VISCOSITY EXOFIN 1ML

## (undated) DEVICE — PAD GROUNDING DUAL ADULT

## (undated) DEVICE — POUCH URO CATCHER II STERILE

## (undated) DEVICE — INTENDED FOR TISSUE SEPARATION, AND OTHER PROCEDURES THAT REQUIRE A SHARP SURGICAL BLADE TO PUNCTURE OR CUT.: Brand: BARD-PARKER ® CARBON RIB-BACK BLADES

## (undated) DEVICE — BOWL: 16OZ PEELPOUCH 75/CS 16/PLT: Brand: MEDEGEN MEDICAL PRODUCTS, LLC

## (undated) DEVICE — NEURO PATTIES 1 X 3

## (undated) DEVICE — CYSTOSCOPY PACK: Brand: CONVERTORS

## (undated) DEVICE — SEAL BIOPSY PORT ADJUST F/ACCESSORIES UP TO 3FR

## (undated) DEVICE — GLOVE SRG BIOGEL 8

## (undated) DEVICE — TOWEL SURG XR DETECT GREEN STRL RFD

## (undated) DEVICE — [HIGH FLOW INSUFFLATOR,  DO NOT USE IF PACKAGE IS DAMAGED,  KEEP DRY,  KEEP AWAY FROM SUNLIGHT,  PROTECT FROM HEAT AND RADIOACTIVE SOURCES.]: Brand: PNEUMOSURE

## (undated) DEVICE — UTILITY MARKER,BLACK WITH LABELS: Brand: DEVON

## (undated) DEVICE — NGAGE, NITINOL STONE EXTRACTOR MODIFIED BASKET: Brand: NGAGE

## (undated) DEVICE — TOWEL SET X-RAY

## (undated) DEVICE — INTENDED FOR TISSUE SEPARATION, AND OTHER PROCEDURES THAT REQUIRE A SHARP SURGICAL BLADE TO PUNCTURE OR CUT.: Brand: BARD-PARKER SAFETY BLADES SIZE 11, STERILE

## (undated) DEVICE — CYSTO TUBING TUR Y IRRIGATION

## (undated) DEVICE — SUT PROLENE 3-0 SH 36 IN 8522H

## (undated) DEVICE — PACK GENERAL LF

## (undated) DEVICE — TUBING 1895522 5PK STRAIGHTSHOT TO XPS: Brand: STRAIGHTSHOT®

## (undated) DEVICE — SUT MONOCRYL 4-0 PS-2 18 IN Y496G

## (undated) DEVICE — PAD GROUNDING ADULT

## (undated) DEVICE — FIXATION DEVICE: Brand: PROTACK

## (undated) DEVICE — SPONGE STICK WITH PVP-I: Brand: KENDALL

## (undated) DEVICE — GLOVE SRG BIOGEL 7

## (undated) DEVICE — SPONGE 4 X 4 XRAY 16 PLY STRL LF RFD

## (undated) DEVICE — ANTI-FOG SOLUTION WITH FOAM PAD: Brand: DEVON

## (undated) DEVICE — TUBING SMOKE EVAC W/FILTRATION DEVICE PLUMEPORT ACTIV

## (undated) DEVICE — SPONGE,TONSIL,DBL STRNG,XRAY,SM,7/8",ST: Brand: MEDLINE INDUSTRIES, INC.

## (undated) DEVICE — RED RUBBER URETHRAL CATHETER: Brand: DOVER